# Patient Record
Sex: FEMALE | Race: WHITE | NOT HISPANIC OR LATINO | Employment: FULL TIME
[De-identification: names, ages, dates, MRNs, and addresses within clinical notes are randomized per-mention and may not be internally consistent; named-entity substitution may affect disease eponyms.]

---

## 2017-06-03 ENCOUNTER — HEALTH MAINTENANCE LETTER (OUTPATIENT)
Age: 37
End: 2017-06-03

## 2017-06-20 ENCOUNTER — TRANSFERRED RECORDS (OUTPATIENT)
Dept: HEALTH INFORMATION MANAGEMENT | Facility: CLINIC | Age: 37
End: 2017-06-20

## 2017-06-27 ENCOUNTER — RADIANT APPOINTMENT (OUTPATIENT)
Dept: ULTRASOUND IMAGING | Facility: CLINIC | Age: 37
End: 2017-06-27
Payer: COMMERCIAL

## 2017-06-27 ENCOUNTER — OFFICE VISIT (OUTPATIENT)
Dept: OBGYN | Facility: CLINIC | Age: 37
End: 2017-06-27
Payer: COMMERCIAL

## 2017-06-27 VITALS
WEIGHT: 183.8 LBS | SYSTOLIC BLOOD PRESSURE: 118 MMHG | BODY MASS INDEX: 28.85 KG/M2 | DIASTOLIC BLOOD PRESSURE: 76 MMHG | HEIGHT: 67 IN

## 2017-06-27 DIAGNOSIS — D21.9 MYOMA: Primary | ICD-10-CM

## 2017-06-27 DIAGNOSIS — D21.9 MYOMA: ICD-10-CM

## 2017-06-27 PROCEDURE — 99203 OFFICE O/P NEW LOW 30 MIN: CPT | Mod: 25 | Performed by: OBSTETRICS & GYNECOLOGY

## 2017-06-27 PROCEDURE — 76830 TRANSVAGINAL US NON-OB: CPT | Performed by: OBSTETRICS & GYNECOLOGY

## 2017-06-27 RX ORDER — ALPRAZOLAM 0.25 MG
0.25 TABLET ORAL
COMMUNITY
Start: 2017-05-22 | End: 2018-01-23

## 2017-06-27 ASSESSMENT — ANXIETY QUESTIONNAIRES
2. NOT BEING ABLE TO STOP OR CONTROL WORRYING: SEVERAL DAYS
5. BEING SO RESTLESS THAT IT IS HARD TO SIT STILL: NOT AT ALL
3. WORRYING TOO MUCH ABOUT DIFFERENT THINGS: NOT AT ALL
IF YOU CHECKED OFF ANY PROBLEMS ON THIS QUESTIONNAIRE, HOW DIFFICULT HAVE THESE PROBLEMS MADE IT FOR YOU TO DO YOUR WORK, TAKE CARE OF THINGS AT HOME, OR GET ALONG WITH OTHER PEOPLE: NOT DIFFICULT AT ALL
GAD7 TOTAL SCORE: 2
7. FEELING AFRAID AS IF SOMETHING AWFUL MIGHT HAPPEN: NOT AT ALL
6. BECOMING EASILY ANNOYED OR IRRITABLE: NOT AT ALL
1. FEELING NERVOUS, ANXIOUS, OR ON EDGE: SEVERAL DAYS

## 2017-06-27 ASSESSMENT — PATIENT HEALTH QUESTIONNAIRE - PHQ9: 5. POOR APPETITE OR OVEREATING: NOT AT ALL

## 2017-06-27 NOTE — MR AVS SNAPSHOT
"              After Visit Summary   2017    Yulisa Rai    MRN: [id number]           Patient Information     Date Of Birth          1980        Visit Information        Provider Department      2017 2:45 PM Josef Masters MD [facility]        Today's Diagnoses     [place]    -  1       Follow-ups after your visit        Who to contact     If you have questions or need follow up information about today's clinic visit or your schedule please contact [facility] directly at [phone].  Normal or non-critical lab and imaging results will be communicated to you by ugichemhart, letter or phone within 4 business days after the clinic has received the results. If you do not hear from us within 7 days, please contact the clinic through ugichemhart or phone. If you have a critical or abnormal lab result, we will notify you by phone as soon as possible.  Submit refill requests through [employer] or call your pharmacy and they will forward the refill request to us. Please allow 3 business days for your refill to be completed.          Additional Information About Your Visit        MyChart Information     [employer] lets you send messages to your doctor, view your test results, renew your prescriptions, schedule appointments and more. To sign up, go to [email or web address] . Click on \"Log in\" on the left side of the screen, which will take you to the Welcome page. Then click on \"Sign up Now\" on the right side of the page.     You will be asked to enter the access code listed below, as well as some personal information. Please follow the directions to create your username and password.     Your access code is: [email or web address]  Expires: 2017  4:26 PM     Your access code will  in 90 days. If you need help or a new code, please call your [place] clinic or [phone].        Care EveryWhere ID     This is your Care EveryWhere ID. This could be used by other " "organizations to access your [place] medical records  [id number]        Your Vitals Were     Height Last Period BMI (Body Mass Index)             5' 7\" (1.702 m) 06/15/2017 (Exact Date) 28.79 kg/m2          Blood Pressure from Last 3 Encounters:   06/27/17 118/76   04/11/11 112/69   03/17/10 (!) 89/57    Weight from Last 3 Encounters:   06/27/17 183 lb 12.8 oz (83.4 kg)   04/11/11 168 lb (76.2 kg)   03/17/10 161 lb (73 kg)               Primary Care Provider Office Phone # Fax #    Melvina Pennington [phone]       [place] [address]        Equal Access to Services     AYDEE MAJOR : Hadii lucy stoddard hadasho Soomaali, waaxda luqadaha, qaybta kaalmada adeegyada, waxay john hayherminia arellano . So [facility] [phone].    ATENCIÓN: Si habla español, tiene a burkett disposición servicios gratuitos de asistencia lingüística. Naveen al [phone].    We comply with applicable federal civil rights laws and [place] laws. We do not discriminate on the basis of race, color, national origin, age, disability sex, sexual orientation or gender identity.            Thank you!     Thank you for choosing [facility] [place]  for your care. Our goal is always to provide you with excellent care. Hearing back from our patients is one way we can continue to improve our services. Please take a few minutes to complete the written survey that you may receive in the mail after your visit with us. Thank you!             Your Updated Medication List - Protect others around you: Learn how to safely use, store and throw away your medicines at www.disposemymeds.org.          This list is accurate as of: 6/27/17  4:26 PM.  Always use your most recent med list.                   Brand Name Dispense Instructions for use Diagnosis    albuterol 90 MCG/ACT inhaler     1 Inhaler    Inhale 2 puffs into the lungs every 4 hours as needed for shortness of breath / dyspnea (or 20 " minutes prior to excercise).    Intermittent asthma       ALPRAZolam 0.25 MG tablet    XANAX     Take 0.25 mg by mouth        VALTREX 1000 mg tablet   Generic drug:  valACYclovir     8    2 tabs po bid x 1 day    Herpes simplex without mention of complication

## 2017-06-27 NOTE — PROGRESS NOTES
SUBJECTIVE:                                                   Yulisa Rai is a 37 year old female who presents to clinic today for the following health issue(s):  Patient presents with:  Consult: fibroids      Additional information:     HPI: The patient is a 37-year-old  3 para 0030 who is seen in consultation for a large intrauterine filling defect. It is indeterminate whether this is a complex of polyps or a intracavitary fibroid. Her menses have become heavier progressively to the point of debilitation. She does not become anemic.      No LMP recorded..   Patient is sexually active, .  Using none for contraception.    reports that she has never smoked. She does not have any smokeless tobacco history on file.    STD testing offered?  Declined    Health maintenance updated:  yes    Today's PHQ-2 Score: No flowsheet data found.  Today's PHQ-9 Score:   PHQ-9 SCORE 2011   Total Score 1     Today's BRIJESH-7 Score: No flowsheet data found.    Problem list and histories reviewed & adjusted, as indicated.  Additional history: as documented.    Patient Active Problem List   Diagnosis     Papanicolaou smear of cervix with atypical squamous cells of undetermined significance (ASC-US)     Personal history of other musculoskeletal disorders     Herpes simplex virus (HSV) infection     Cyst of Bartholin's gland     Papanicolaou smear of cervix with low grade squamous intraepithelial lesion (LGSIL)     Bunions     CARDIOVASCULAR SCREENING; LDL GOAL LESS THAN 160     Generalized anxiety disorder     Intermittent asthma     Past Surgical History:   Procedure Laterality Date     C NONSPECIFIC PROCEDURE      wisdom teeth extraction     C NONSPECIFIC PROCEDURE      rt. wrist fx     C NONSPECIFIC PROCEDURE  , 3/07    ASCUS, lgsil     C NONSPECIFIC PROCEDURE  3/03, 3/07    colposcopy      Social History   Substance Use Topics     Smoking status: Never Smoker     Smokeless tobacco: Not on file      Alcohol use Yes      Comment: 4/wk      Problem (# of Occurrences) Relation (Name,Age of Onset)    C.A.D. (2) Paternal Grandmother, Paternal Grandfather    Hypertension (2) Father, Mother    Lipids (2) Mother, Father            Current Outpatient Prescriptions   Medication Sig     citalopram (CELEXA) 20 MG tablet Take 1 tablet by mouth daily.     albuterol 90 MCG/ACT inhaler Inhale 2 puffs into the lungs every 4 hours as needed for shortness of breath / dyspnea (or 20 minutes prior to excercise).     norgestimate-ethinyl estradiol (SPRINTEC 28) 0.25-35 MG-MCG tablet Take 1 tablet by mouth daily.     ORTHO TRI-CYCLEN TABS   OR 1 tab po as directed     PROAIR  (90 BASE) MCG/ACT IN AERS INHALE 1 TO 2 PUFFS EVERY 4 TO 6 HOURS AS NEEDED     VALTREX 1 GM OR TABS 2 tabs po bid x 1 day     LUNESTA 2 MG OR TABS 1 TABLET AT BEDTIME     No current facility-administered medications for this visit.      Allergies   Allergen Reactions     No Known Drug Allergies        ROS:  12 point review of systems negative other than symptoms noted below.  Genitourinary: Cramps and Heavy Bleeding with Period    OBJECTIVE:     There were no vitals taken for this visit.  There is no height or weight on file to calculate BMI.    Exam:  Constitutional:  Appearance: Well nourished, well developed alert, in no acute distress  Chest:  Respiratory Effort:  Breathing unlabored  Cardiovascular: Heart: Auscultation:  Regular rate, normal rhythm, no murmurs present  Gastrointestinal:  Abdominal Examination:  Abdomen nontender to palpation, tone normal without rigidity or guarding, no masses present, umbilicus without lesions; Liver/Spleen:  No hepatomegaly present, liver nontender to palpation; Hernias:  No hernias present  Lymphatic: Lymph Nodes:  No other lymphadenopathy present  Skin:General Inspection:  No rashes present, no lesions present, no areas of discoloration; Genitalia and Groin:  No rashes present, no lesions present, no areas of  discoloration, no masses present.  Neurologic/Psychiatric:  Mental Status:  Oriented X3   Pelvic Exam:  External Genitalia:     Normal appearance for age, no discharge present, no tenderness present, no inflammatory lesions present, color normal  Vagina:     Normal vaginal vault without central or paravaginal defects, no discharge present, no inflammatory lesions present, no masses present  Bladder:     Nontender to palpation  Urethra:   Urethral Body:  Urethra palpation normal, urethra structural support normal   Urethral Meatus:  No erythema or lesions present  Cervix:     Appearance healthy, no lesions present, nontender to palpation, no bleeding present  Uterus:     Uterus: firm, normal sized and nontender, midplane in position. Large firm post culdesac myoma  Adnexa:     No adnexal tenderness present, no adnexal masses present  Perineum:     Perineum within normal limits, no evidence of trauma, no rashes or skin lesions present  Anus:     Anus within normal limits, no hemorrhoids present  Inguinal Lymph Nodes:     No lymphadenopathy present  Pubic Hair:     Normal pubic hair distribution for age  Genitalia and Groin:     No rashes present, no lesions present, no areas of discoloration, no masses present     In-Clinic Test Results: 1.1 cm intracavitary fibroid. 6.5 cm pedunculated posterior calcified myoma.      ASSESSMENT/PLAN:                                                        Patient with repetitive pregnancy loss and an intracavitary fibroid. She needs a hysteroscopy with resection of the fibroid. We have also showed our concerns about the size of her fibroid and obstruction to let delivery. She will contemplate laparoscopic evaluation for myomectomy. She understands that there is a 25-30% chance of having to do a laparotomy for removal of the fibroid. She will place this on her calendar when it is appropriate with work and family.        Josef Masters MD  [facility] [place]

## 2017-06-28 ENCOUNTER — TELEPHONE (OUTPATIENT)
Dept: OBGYN | Facility: CLINIC | Age: 37
End: 2017-06-28

## 2017-06-28 ASSESSMENT — ANXIETY QUESTIONNAIRES: GAD7 TOTAL SCORE: 2

## 2017-06-28 ASSESSMENT — PATIENT HEALTH QUESTIONNAIRE - PHQ9: SUM OF ALL RESPONSES TO PHQ QUESTIONS 1-9: 0

## 2017-06-28 NOTE — TELEPHONE ENCOUNTER
FROM VERBAL AS NO SX REQUEST IN PT CHART    Patient surgery scheduled on 7/20/2017 at 9AM Check in 7am  Location for surgery to performed:   Surgery Outpatient  Scheduled by Luci 6/27/2017     Information Packet given :Yes: HANDED 6/27/2017    CPT codes given: Yes    15682   05374     Consents signed? N/A  Rep Informed :N/A    PREOP DATE :  6/27/2017  In Epic :Yes    On Spreadsheet :Yes    On Calendar [facility]  :Yes    In [place] Calendar SAI  :No    Assist NA   Assist in Epic NA  Assist Notified as needed :No     Kaitlynn Betancur  Surgery Scheduler

## 2017-07-01 ENCOUNTER — HEALTH MAINTENANCE LETTER (OUTPATIENT)
Age: 37
End: 2017-07-01

## 2017-07-14 NOTE — H&P (VIEW-ONLY)
SUBJECTIVE:                                                   Yulisa Rai is a 37 year old female who presents to clinic today for the following health issue(s):  Patient presents with:  Consult: fibroids      Additional information:     HPI: The patient is a 37-year-old  3 para 0030 who is seen in consultation for a large intrauterine filling defect. It is indeterminate whether this is a complex of polyps or a intracavitary fibroid. Her menses have become heavier progressively to the point of debilitation. She does not become anemic.      No LMP recorded..   Patient is sexually active, .  Using none for contraception.    reports that she has never smoked. She does not have any smokeless tobacco history on file.    STD testing offered?  Declined    Health maintenance updated:  yes    Today's PHQ-2 Score: No flowsheet data found.  Today's PHQ-9 Score:   PHQ-9 SCORE 2011   Total Score 1     Today's BRIJESH-7 Score: No flowsheet data found.    Problem list and histories reviewed & adjusted, as indicated.  Additional history: as documented.    Patient Active Problem List   Diagnosis     Papanicolaou smear of cervix with atypical squamous cells of undetermined significance (ASC-US)     Personal history of other musculoskeletal disorders     Herpes simplex virus (HSV) infection     Cyst of Bartholin's gland     Papanicolaou smear of cervix with low grade squamous intraepithelial lesion (LGSIL)     Bunions     CARDIOVASCULAR SCREENING; LDL GOAL LESS THAN 160     Generalized anxiety disorder     Intermittent asthma     Past Surgical History:   Procedure Laterality Date     C NONSPECIFIC PROCEDURE      wisdom teeth extraction     C NONSPECIFIC PROCEDURE      rt. wrist fx     C NONSPECIFIC PROCEDURE  , 3/07    ASCUS, lgsil     C NONSPECIFIC PROCEDURE  3/03, 3/07    colposcopy      Social History   Substance Use Topics     Smoking status: Never Smoker     Smokeless tobacco: Not on file      Alcohol use Yes      Comment: 4/wk      Problem (# of Occurrences) Relation (Name,Age of Onset)    C.A.D. (2) Paternal Grandmother, Paternal Grandfather    Hypertension (2) Father, Mother    Lipids (2) Mother, Father            Current Outpatient Prescriptions   Medication Sig     citalopram (CELEXA) 20 MG tablet Take 1 tablet by mouth daily.     albuterol 90 MCG/ACT inhaler Inhale 2 puffs into the lungs every 4 hours as needed for shortness of breath / dyspnea (or 20 minutes prior to excercise).     norgestimate-ethinyl estradiol (SPRINTEC 28) 0.25-35 MG-MCG tablet Take 1 tablet by mouth daily.     ORTHO TRI-CYCLEN TABS   OR 1 tab po as directed     PROAIR  (90 BASE) MCG/ACT IN AERS INHALE 1 TO 2 PUFFS EVERY 4 TO 6 HOURS AS NEEDED     VALTREX 1 GM OR TABS 2 tabs po bid x 1 day     LUNESTA 2 MG OR TABS 1 TABLET AT BEDTIME     No current facility-administered medications for this visit.      Allergies   Allergen Reactions     No Known Drug Allergies        ROS:  12 point review of systems negative other than symptoms noted below.  Genitourinary: Cramps and Heavy Bleeding with Period    OBJECTIVE:     There were no vitals taken for this visit.  There is no height or weight on file to calculate BMI.    Exam:  Constitutional:  Appearance: Well nourished, well developed alert, in no acute distress  Chest:  Respiratory Effort:  Breathing unlabored  Cardiovascular: Heart: Auscultation:  Regular rate, normal rhythm, no murmurs present  Gastrointestinal:  Abdominal Examination:  Abdomen nontender to palpation, tone normal without rigidity or guarding, no masses present, umbilicus without lesions; Liver/Spleen:  No hepatomegaly present, liver nontender to palpation; Hernias:  No hernias present  Lymphatic: Lymph Nodes:  No other lymphadenopathy present  Skin:General Inspection:  No rashes present, no lesions present, no areas of discoloration; Genitalia and Groin:  No rashes present, no lesions present, no areas of  discoloration, no masses present.  Neurologic/Psychiatric:  Mental Status:  Oriented X3   Pelvic Exam:  External Genitalia:     Normal appearance for age, no discharge present, no tenderness present, no inflammatory lesions present, color normal  Vagina:     Normal vaginal vault without central or paravaginal defects, no discharge present, no inflammatory lesions present, no masses present  Bladder:     Nontender to palpation  Urethra:   Urethral Body:  Urethra palpation normal, urethra structural support normal   Urethral Meatus:  No erythema or lesions present  Cervix:     Appearance healthy, no lesions present, nontender to palpation, no bleeding present  Uterus:     Uterus: firm, normal sized and nontender, midplane in position. Large firm post culdesac myoma  Adnexa:     No adnexal tenderness present, no adnexal masses present  Perineum:     Perineum within normal limits, no evidence of trauma, no rashes or skin lesions present  Anus:     Anus within normal limits, no hemorrhoids present  Inguinal Lymph Nodes:     No lymphadenopathy present  Pubic Hair:     Normal pubic hair distribution for age  Genitalia and Groin:     No rashes present, no lesions present, no areas of discoloration, no masses present     In-Clinic Test Results: 1.1 cm intracavitary fibroid. 6.5 cm pedunculated posterior calcified myoma.      ASSESSMENT/PLAN:                                                        Patient with repetitive pregnancy loss and an intracavitary fibroid. She needs a hysteroscopy with resection of the fibroid. We have also showed our concerns about the size of her fibroid and obstruction to let delivery. She will contemplate laparoscopic evaluation for myomectomy. She understands that there is a 25-30% chance of having to do a laparotomy for removal of the fibroid. She will place this on her calendar when it is appropriate with work and family.        Josef Masters MD  [facility] [place]

## 2017-07-20 ENCOUNTER — ANESTHESIA (OUTPATIENT)
Dept: SURGERY | Facility: CLINIC | Age: 37
End: 2017-07-20
Payer: COMMERCIAL

## 2017-07-20 ENCOUNTER — ANESTHESIA EVENT (OUTPATIENT)
Dept: SURGERY | Facility: CLINIC | Age: 37
End: 2017-07-20
Payer: COMMERCIAL

## 2017-07-20 ENCOUNTER — HOSPITAL ENCOUNTER (OUTPATIENT)
Facility: CLINIC | Age: 37
Discharge: HOME | End: 2017-07-20
Attending: OBSTETRICS & GYNECOLOGY | Admitting: OBSTETRICS & GYNECOLOGY
Payer: COMMERCIAL

## 2017-07-20 VITALS
TEMPERATURE: 97.9 F | RESPIRATION RATE: 16 BRPM | BODY MASS INDEX: 29.49 KG/M2 | HEART RATE: 86 BPM | WEIGHT: 187.9 LBS | HEIGHT: 67 IN | DIASTOLIC BLOOD PRESSURE: 66 MMHG | SYSTOLIC BLOOD PRESSURE: 99 MMHG | OXYGEN SATURATION: 96 %

## 2017-07-20 DIAGNOSIS — D25.0 SUBMUCOUS AND SUBSEROUS LEIOMYOMA OF UTERUS: Primary | ICD-10-CM

## 2017-07-20 DIAGNOSIS — D25.2 SUBMUCOUS AND SUBSEROUS LEIOMYOMA OF UTERUS: Primary | ICD-10-CM

## 2017-07-20 LAB — HCG SERPL QL: NEGATIVE

## 2017-07-20 PROCEDURE — 58561 HYSTEROSCOPY REMOVE MYOMA: CPT | Performed by: OBSTETRICS & GYNECOLOGY

## 2017-07-20 PROCEDURE — 88305 TISSUE EXAM BY PATHOLOGIST: CPT | Mod: 26 | Performed by: OBSTETRICS & GYNECOLOGY

## 2017-07-20 PROCEDURE — 71000012 ZZH RECOVERY PHASE 1 LEVEL 1 FIRST HR: Performed by: OBSTETRICS & GYNECOLOGY

## 2017-07-20 PROCEDURE — 71000027 ZZH RECOVERY PHASE 2 EACH 15 MINS: Performed by: OBSTETRICS & GYNECOLOGY

## 2017-07-20 PROCEDURE — 37000008 ZZH ANESTHESIA TECHNICAL FEE, 1ST 30 MIN: Performed by: OBSTETRICS & GYNECOLOGY

## 2017-07-20 PROCEDURE — 88305 TISSUE EXAM BY PATHOLOGIST: CPT | Performed by: OBSTETRICS & GYNECOLOGY

## 2017-07-20 PROCEDURE — 25000125 ZZHC RX 250: Performed by: NURSE ANESTHETIST, CERTIFIED REGISTERED

## 2017-07-20 PROCEDURE — 25000128 H RX IP 250 OP 636: Performed by: ANESTHESIOLOGY

## 2017-07-20 PROCEDURE — 71000013 ZZH RECOVERY PHASE 1 LEVEL 1 EA ADDTL HR: Performed by: OBSTETRICS & GYNECOLOGY

## 2017-07-20 PROCEDURE — 27210794 ZZH OR GENERAL SUPPLY STERILE: Performed by: OBSTETRICS & GYNECOLOGY

## 2017-07-20 PROCEDURE — 25000128 H RX IP 250 OP 636: Performed by: NURSE ANESTHETIST, CERTIFIED REGISTERED

## 2017-07-20 PROCEDURE — 25000128 H RX IP 250 OP 636: Performed by: OBSTETRICS & GYNECOLOGY

## 2017-07-20 PROCEDURE — 40000170 ZZH STATISTIC PRE-PROCEDURE ASSESSMENT II: Performed by: OBSTETRICS & GYNECOLOGY

## 2017-07-20 PROCEDURE — 58545 LAPAROSCOPIC MYOMECTOMY: CPT | Performed by: OBSTETRICS & GYNECOLOGY

## 2017-07-20 PROCEDURE — 36000063 ZZH SURGERY LEVEL 4 EA 15 ADDTL MIN: Performed by: OBSTETRICS & GYNECOLOGY

## 2017-07-20 PROCEDURE — 36000093 ZZH SURGERY LEVEL 4 1ST 30 MIN: Performed by: OBSTETRICS & GYNECOLOGY

## 2017-07-20 PROCEDURE — 84703 CHORIONIC GONADOTROPIN ASSAY: CPT | Performed by: ANESTHESIOLOGY

## 2017-07-20 PROCEDURE — 25000566 ZZH SEVOFLURANE, EA 15 MIN: Performed by: OBSTETRICS & GYNECOLOGY

## 2017-07-20 PROCEDURE — S0020 INJECTION, BUPIVICAINE HYDRO: HCPCS | Performed by: OBSTETRICS & GYNECOLOGY

## 2017-07-20 PROCEDURE — 37000009 ZZH ANESTHESIA TECHNICAL FEE, EACH ADDTL 15 MIN: Performed by: OBSTETRICS & GYNECOLOGY

## 2017-07-20 PROCEDURE — 25000132 ZZH RX MED GY IP 250 OP 250 PS 637: Performed by: OBSTETRICS & GYNECOLOGY

## 2017-07-20 PROCEDURE — 36415 COLL VENOUS BLD VENIPUNCTURE: CPT | Performed by: ANESTHESIOLOGY

## 2017-07-20 PROCEDURE — 27210995 ZZH RX 272: Performed by: OBSTETRICS & GYNECOLOGY

## 2017-07-20 PROCEDURE — 25000125 ZZHC RX 250: Performed by: OBSTETRICS & GYNECOLOGY

## 2017-07-20 PROCEDURE — 25800025 ZZH RX 258: Performed by: OBSTETRICS & GYNECOLOGY

## 2017-07-20 RX ORDER — GLYCOPYRROLATE 0.2 MG/ML
INJECTION, SOLUTION INTRAMUSCULAR; INTRAVENOUS PRN
Status: DISCONTINUED | OUTPATIENT
Start: 2017-07-20 | End: 2017-07-20

## 2017-07-20 RX ORDER — EPHEDRINE SULFATE 50 MG/ML
INJECTION, SOLUTION INTRAMUSCULAR; INTRAVENOUS; SUBCUTANEOUS PRN
Status: DISCONTINUED | OUTPATIENT
Start: 2017-07-20 | End: 2017-07-20

## 2017-07-20 RX ORDER — ONDANSETRON 2 MG/ML
INJECTION INTRAMUSCULAR; INTRAVENOUS PRN
Status: DISCONTINUED | OUTPATIENT
Start: 2017-07-20 | End: 2017-07-20

## 2017-07-20 RX ORDER — FENTANYL CITRATE 50 UG/ML
25-50 INJECTION, SOLUTION INTRAMUSCULAR; INTRAVENOUS
Status: DISCONTINUED | OUTPATIENT
Start: 2017-07-20 | End: 2017-07-20 | Stop reason: HOSPADM

## 2017-07-20 RX ORDER — ONDANSETRON 2 MG/ML
4 INJECTION INTRAMUSCULAR; INTRAVENOUS EVERY 30 MIN PRN
Status: DISCONTINUED | OUTPATIENT
Start: 2017-07-20 | End: 2017-07-20 | Stop reason: HOSPADM

## 2017-07-20 RX ORDER — FENTANYL CITRATE 50 UG/ML
INJECTION, SOLUTION INTRAMUSCULAR; INTRAVENOUS PRN
Status: DISCONTINUED | OUTPATIENT
Start: 2017-07-20 | End: 2017-07-20

## 2017-07-20 RX ORDER — BUPIVACAINE HYDROCHLORIDE 2.5 MG/ML
INJECTION, SOLUTION EPIDURAL; INFILTRATION; INTRACAUDAL
Status: DISCONTINUED
Start: 2017-07-20 | End: 2017-07-20 | Stop reason: HOSPADM

## 2017-07-20 RX ORDER — BUPIVACAINE HYDROCHLORIDE 2.5 MG/ML
INJECTION, SOLUTION INFILTRATION; PERINEURAL PRN
Status: DISCONTINUED | OUTPATIENT
Start: 2017-07-20 | End: 2017-07-20 | Stop reason: HOSPADM

## 2017-07-20 RX ORDER — MAGNESIUM HYDROXIDE 1200 MG/15ML
LIQUID ORAL PRN
Status: DISCONTINUED | OUTPATIENT
Start: 2017-07-20 | End: 2017-07-20 | Stop reason: HOSPADM

## 2017-07-20 RX ORDER — DEXAMETHASONE SODIUM PHOSPHATE 4 MG/ML
INJECTION, SOLUTION INTRA-ARTICULAR; INTRALESIONAL; INTRAMUSCULAR; INTRAVENOUS; SOFT TISSUE PRN
Status: DISCONTINUED | OUTPATIENT
Start: 2017-07-20 | End: 2017-07-20

## 2017-07-20 RX ORDER — HYDROMORPHONE HYDROCHLORIDE 1 MG/ML
.3-.5 INJECTION, SOLUTION INTRAMUSCULAR; INTRAVENOUS; SUBCUTANEOUS EVERY 10 MIN PRN
Status: DISCONTINUED | OUTPATIENT
Start: 2017-07-20 | End: 2017-07-20 | Stop reason: HOSPADM

## 2017-07-20 RX ORDER — CEFAZOLIN SODIUM 1 G/3ML
1 INJECTION, POWDER, FOR SOLUTION INTRAMUSCULAR; INTRAVENOUS SEE ADMIN INSTRUCTIONS
Status: DISCONTINUED | OUTPATIENT
Start: 2017-07-20 | End: 2017-07-20 | Stop reason: HOSPADM

## 2017-07-20 RX ORDER — PROPOFOL 10 MG/ML
INJECTION, EMULSION INTRAVENOUS CONTINUOUS PRN
Status: DISCONTINUED | OUTPATIENT
Start: 2017-07-20 | End: 2017-07-20

## 2017-07-20 RX ORDER — NEOSTIGMINE METHYLSULFATE 1 MG/ML
VIAL (ML) INJECTION PRN
Status: DISCONTINUED | OUTPATIENT
Start: 2017-07-20 | End: 2017-07-20

## 2017-07-20 RX ORDER — PROPOFOL 10 MG/ML
INJECTION, EMULSION INTRAVENOUS PRN
Status: DISCONTINUED | OUTPATIENT
Start: 2017-07-20 | End: 2017-07-20

## 2017-07-20 RX ORDER — NALOXONE HYDROCHLORIDE 0.4 MG/ML
.1-.4 INJECTION, SOLUTION INTRAMUSCULAR; INTRAVENOUS; SUBCUTANEOUS
Status: DISCONTINUED | OUTPATIENT
Start: 2017-07-20 | End: 2017-07-20 | Stop reason: HOSPADM

## 2017-07-20 RX ORDER — FENTANYL CITRATE 50 UG/ML
25-50 INJECTION, SOLUTION INTRAMUSCULAR; INTRAVENOUS EVERY 5 MIN PRN
Status: DISCONTINUED | OUTPATIENT
Start: 2017-07-20 | End: 2017-07-20 | Stop reason: HOSPADM

## 2017-07-20 RX ORDER — OXYCODONE AND ACETAMINOPHEN 5; 325 MG/1; MG/1
1-2 TABLET ORAL
Status: COMPLETED | OUTPATIENT
Start: 2017-07-20 | End: 2017-07-20

## 2017-07-20 RX ORDER — KETOROLAC TROMETHAMINE 30 MG/ML
INJECTION, SOLUTION INTRAMUSCULAR; INTRAVENOUS PRN
Status: DISCONTINUED | OUTPATIENT
Start: 2017-07-20 | End: 2017-07-20

## 2017-07-20 RX ORDER — PHYSOSTIGMINE SALICYLATE 1 MG/ML
1.2 INJECTION INTRAVENOUS
Status: DISCONTINUED | OUTPATIENT
Start: 2017-07-20 | End: 2017-07-20 | Stop reason: HOSPADM

## 2017-07-20 RX ORDER — OXYCODONE AND ACETAMINOPHEN 5; 325 MG/1; MG/1
1-2 TABLET ORAL EVERY 4 HOURS PRN
Qty: 20 TABLET | Refills: 0 | Status: SHIPPED | OUTPATIENT
Start: 2017-07-20 | End: 2017-08-03

## 2017-07-20 RX ORDER — CEFAZOLIN SODIUM 2 G/100ML
2 INJECTION, SOLUTION INTRAVENOUS
Status: COMPLETED | OUTPATIENT
Start: 2017-07-20 | End: 2017-07-20

## 2017-07-20 RX ORDER — PHENAZOPYRIDINE HYDROCHLORIDE 200 MG/1
200 TABLET, FILM COATED ORAL ONCE
Status: DISCONTINUED | OUTPATIENT
Start: 2017-07-20 | End: 2017-07-20 | Stop reason: HOSPADM

## 2017-07-20 RX ORDER — SODIUM CHLORIDE, SODIUM LACTATE, POTASSIUM CHLORIDE, CALCIUM CHLORIDE 600; 310; 30; 20 MG/100ML; MG/100ML; MG/100ML; MG/100ML
INJECTION, SOLUTION INTRAVENOUS CONTINUOUS PRN
Status: DISCONTINUED | OUTPATIENT
Start: 2017-07-20 | End: 2017-07-20

## 2017-07-20 RX ORDER — SODIUM CHLORIDE, SODIUM LACTATE, POTASSIUM CHLORIDE, CALCIUM CHLORIDE 600; 310; 30; 20 MG/100ML; MG/100ML; MG/100ML; MG/100ML
INJECTION, SOLUTION INTRAVENOUS CONTINUOUS
Status: DISCONTINUED | OUTPATIENT
Start: 2017-07-20 | End: 2017-07-20 | Stop reason: HOSPADM

## 2017-07-20 RX ORDER — ONDANSETRON 4 MG/1
4 TABLET, ORALLY DISINTEGRATING ORAL EVERY 30 MIN PRN
Status: DISCONTINUED | OUTPATIENT
Start: 2017-07-20 | End: 2017-07-20 | Stop reason: HOSPADM

## 2017-07-20 RX ORDER — LIDOCAINE HYDROCHLORIDE 20 MG/ML
INJECTION, SOLUTION INFILTRATION; PERINEURAL PRN
Status: DISCONTINUED | OUTPATIENT
Start: 2017-07-20 | End: 2017-07-20

## 2017-07-20 RX ORDER — MEPERIDINE HYDROCHLORIDE 25 MG/ML
12.5 INJECTION INTRAMUSCULAR; INTRAVENOUS; SUBCUTANEOUS
Status: DISCONTINUED | OUTPATIENT
Start: 2017-07-20 | End: 2017-07-20 | Stop reason: HOSPADM

## 2017-07-20 RX ADMIN — KETOROLAC TROMETHAMINE 30 MG: 30 INJECTION, SOLUTION INTRAMUSCULAR at 09:43

## 2017-07-20 RX ADMIN — FENTANYL CITRATE 50 MCG: 50 INJECTION, SOLUTION INTRAMUSCULAR; INTRAVENOUS at 09:02

## 2017-07-20 RX ADMIN — MIDAZOLAM HYDROCHLORIDE 2 MG: 1 INJECTION, SOLUTION INTRAMUSCULAR; INTRAVENOUS at 08:46

## 2017-07-20 RX ADMIN — PROPOFOL 200 MG: 10 INJECTION, EMULSION INTRAVENOUS at 08:46

## 2017-07-20 RX ADMIN — Medication 7.5 MG: at 09:43

## 2017-07-20 RX ADMIN — ONDANSETRON 4 MG: 2 INJECTION INTRAMUSCULAR; INTRAVENOUS at 08:53

## 2017-07-20 RX ADMIN — FENTANYL CITRATE 50 MCG: 50 INJECTION, SOLUTION INTRAMUSCULAR; INTRAVENOUS at 10:34

## 2017-07-20 RX ADMIN — GLYCOPYRROLATE 0.2 MG: 0.2 INJECTION, SOLUTION INTRAMUSCULAR; INTRAVENOUS at 09:44

## 2017-07-20 RX ADMIN — LIDOCAINE HYDROCHLORIDE 60 MG: 20 INJECTION, SOLUTION INFILTRATION; PERINEURAL at 08:46

## 2017-07-20 RX ADMIN — GLYCOPYRROLATE 0.4 MG: 0.2 INJECTION, SOLUTION INTRAMUSCULAR; INTRAVENOUS at 09:42

## 2017-07-20 RX ADMIN — ROCURONIUM BROMIDE 10 MG: 10 INJECTION INTRAVENOUS at 08:54

## 2017-07-20 RX ADMIN — NEOSTIGMINE METHYLSULFATE 2 MG: 1 INJECTION INTRAMUSCULAR; INTRAVENOUS; SUBCUTANEOUS at 09:42

## 2017-07-20 RX ADMIN — CEFAZOLIN SODIUM 2 G: 2 INJECTION, SOLUTION INTRAVENOUS at 08:48

## 2017-07-20 RX ADMIN — ROCURONIUM BROMIDE 10 MG: 10 INJECTION INTRAVENOUS at 09:22

## 2017-07-20 RX ADMIN — SODIUM CHLORIDE, POTASSIUM CHLORIDE, SODIUM LACTATE AND CALCIUM CHLORIDE: 600; 310; 30; 20 INJECTION, SOLUTION INTRAVENOUS at 09:23

## 2017-07-20 RX ADMIN — GLYCOPYRROLATE 0.1 MG: 0.2 INJECTION, SOLUTION INTRAMUSCULAR; INTRAVENOUS at 09:49

## 2017-07-20 RX ADMIN — SODIUM CHLORIDE, POTASSIUM CHLORIDE, SODIUM LACTATE AND CALCIUM CHLORIDE: 600; 310; 30; 20 INJECTION, SOLUTION INTRAVENOUS at 08:46

## 2017-07-20 RX ADMIN — OXYCODONE HYDROCHLORIDE AND ACETAMINOPHEN 1 TABLET: 5; 325 TABLET ORAL at 11:14

## 2017-07-20 RX ADMIN — DEXMEDETOMIDINE HYDROCHLORIDE 8 MCG: 100 INJECTION, SOLUTION INTRAVENOUS at 09:43

## 2017-07-20 RX ADMIN — NEOSTIGMINE METHYLSULFATE 2 MG: 1 INJECTION INTRAMUSCULAR; INTRAVENOUS; SUBCUTANEOUS at 09:43

## 2017-07-20 RX ADMIN — DEXAMETHASONE SODIUM PHOSPHATE 4 MG: 4 INJECTION, SOLUTION INTRA-ARTICULAR; INTRALESIONAL; INTRAMUSCULAR; INTRAVENOUS; SOFT TISSUE at 08:54

## 2017-07-20 RX ADMIN — PROPOFOL 200 MCG/KG/MIN: 10 INJECTION, EMULSION INTRAVENOUS at 08:46

## 2017-07-20 RX ADMIN — ROCURONIUM BROMIDE 30 MG: 10 INJECTION INTRAVENOUS at 08:46

## 2017-07-20 RX ADMIN — FENTANYL CITRATE 50 MCG: 50 INJECTION, SOLUTION INTRAMUSCULAR; INTRAVENOUS at 08:46

## 2017-07-20 NOTE — IP AVS SNAPSHOT
[facility]    [address]    [place] [address]    Phone:  [phone]    Fax:  [phone]                                       After Visit Summary   7/20/2017    Yulisa Rai    MRN: [id number]           After Visit Summary Signature Page     I have received my discharge instructions, and my questions have been answered. I have discussed any challenges I see with this plan with the nurse or doctor.    ..........................................................................................................................................  Patient/Patient Representative Signature      ..........................................................................................................................................  Patient Representative Print Name and Relationship to Patient    ..................................................               ................................................  Date                                            Time    ..........................................................................................................................................  Reviewed by Signature/Title    ...................................................              ..............................................  Date                                                            Time

## 2017-07-20 NOTE — IP AVS SNAPSHOT
MRN:[id number]                      After Visit Summary   7/20/2017    Yulisa Rai    MRN: [id number]           Thank you!     Thank you for choosing [facility] for your care. Our goal is always to provide you with excellent care. Hearing back from our patients is one way we can continue to improve our services. Please take a few minutes to complete the written survey that you may receive in the mail after you visit with us. Thank you!        Patient Information     Date Of Birth          1980        About your hospital stay     You were admitted on:  July 20, 2017 You last received care in the:  [facility] Same Day Surgery    You were discharged on:  July 20, 2017       Who to Call     For medical emergencies, please call 911.  For non-urgent questions about your medical care, please call your primary care provider or clinic, [phone]  For questions related to your surgery, please call your surgery clinic        Attending Provider     Provider Josef Kaufman MD OB/Gyn       Primary Care Provider Office Phone # Fax #    Melvina Pennington [phone]      After Care Instructions     Discharge Instructions       Patient to arrange follow up appointment in 2  weeks                  Further instructions from your care team       Same Day Surgery Discharge Instructions for  Sedation and General Anesthesia       It's not unusual to feel dizzy, light-headed or faint for up to 24 hours after surgery or while taking pain medication.  If you have these symptoms: sit for a few minutes before standing and have someone assist you when you get up to walk or use the bathroom.      You should rest and relax for the next 24 hours. We recommend you make arrangements to have an adult stay with you for at least 24 hours after your discharge.  Avoid hazardous and strenuous activity.      DO NOT DRIVE any vehicle or operate mechanical equipment for 24 hours  following the end of your surgery.  Even though you may feel normal, your reactions may be affected by the medication you have received.      Do not drink alcoholic beverages for 24 hours following surgery.       Slowly progress to your regular diet as you feel able. It's not unusual to feel nauseated and/or vomit after receiving anesthesia.  If you develop these symptoms, drink clear liquids (apple juice, ginger ale, broth, 7-up, etc. ) until you feel better.  If your nausea and vomiting persists for 24 hours, please notify your surgeon.        All narcotic pain medications, along with inactivity and anesthesia, can cause constipation. Drinking plenty of liquids and increasing fiber intake will help.      For any questions of a medical nature, call your surgeon.      Do not make important decisions for 24 hours.      If you had general anesthesia, you may have a sore throat for a couple of days related to the breathing tube used during surgery.  You may use Cepacol lozenges to help with this discomfort.  If it worsens or if you develop a fever, contact your surgeon.       If you feel your pain is not well managed with the pain medications prescribed by your surgeon, please contact your surgeon's office to let them know so they can address your concerns.     While you were at the hospital today you received Toradol, an antiinflammatory medication similar to Ibuprofen.  You should not take other antiinflammatory medication, such as Ibuprofen, Motrin, Advil, Aleve, Naprosyn, etc, until 3:45 PM    HOME CARE FOLLOWING LAPAROSCOPY  Dr. Masters and Dr. Haines  [phone]      Diet  You have no restrictions on your diet.  During the evening following surgery, drink plenty of fluids and eat a light supper.    Nausea  The anesthesia may produce some nausea.  If you feel nauseated try drinking fluids such as 7-Up, tea, or soup.     Discomfort  The amount of discomfort you can expect is very unpredictable.  If you have pain  that cannot be controlled with Tylenol or with the prescription you may have received, you should notify your physician.  The following complaints are not uncommon and should not be cause for concern:  1. Abdominal tenderness; abdominal cramping.  2. Low backache or pain radiating to your shoulders, chest or back.  This is a result of the gas used to inflate your abdomen during surgery.  Lying flat in bed seems to help relieve this.   3. Sore throat for a day or two resulting from the anesthesia tube used during surgery.   4. Some bruising on your abdomen.     Drainage  You may expect a small amount of drainage from the incision on your abdomen and you may change the bandage when necessary.  You will also have a small amount of vaginal drainage for several days; this is normal and no cause for concern.  If excessive bleeding occurs, notify your physician.      If dye was used during your procedure, your urine will initially be bright blue. It will gradually return to yellow throughout the day. Drinking plenty of fluids will help to filter the dye from your urine.    Fever  A low grade fever (not over 101  Fahrenheit) is usual after this procedure.  Do not hesitate to notify your physician if your fever seems excessive.    Activity  Rest on the day of surgery then you may resume your normal activity, as tolerated. Avoid heavy lifting for one week.    You may shower.  Do not douche or use tampons.  If you also had a D&C, do not resume intercourse until bleeding has ceased.    Emergency Care  Contact your physician if you have any of these problems:   1.  A fever over 101  Fahrenheit   2.  A large amount of bleeding or drainage   3.  Severe pain         [facility]  Discharge Instructions  Following Hysteroscopy    Activity  You may resume normal activities including lifting as needed.  It is permissible to climb stairs. You may drive after 24 hours as long as you are not taking narcotic pain pills.   "Baths or showers are perfectly acceptable.      Vaginal Discharge  You may have some vaginal bleeding or discharge for about a week after procedure.  You may use tampons or pads.    Temperature  If you develop temperature elevations to over 101  Fahrenheit, your physician should be called immediately.    Diet  Luce or light diet is advisable the day of surgery.  If nausea persists, continue this diet.  If severe, call.    Follow-up  Make an appointment in 1-2 weeks if instructed to at: [phone]        [facility]  [address] [place] [address]  [phone]  HORTENSIA Oden MD       **If you have questions or concerns about your procedure,   call Dr. Masters at [phone]**  .               Pending Results     Date and Time Order Name Status Description    2017 0922 Surgical pathology exam In process             Admission Information     Date & Time Provider Department Dept. Phone    2017 Josef Masters MD [facility] Same Day Surgery [phone]      Your Vitals Were     Blood Pressure Pulse Temperature Respirations Height Weight    111/76 86 97  F (36.1  C) 24 1.702 m (5' 7\") 85.2 kg (187 lb 14.4 oz)    Last Period Pulse Oximetry BMI (Body Mass Index)             07/10/2017 (Exact Date) 100% 29.43 kg/m2         [employer] Information     [employer] lets you send messages to your doctor, view your test results, renew your prescriptions, schedule appointments and more. To sign up, go to [email or web address] . Click on \"Log in\" on the left side of the screen, which will take you to the Welcome page. Then click on \"Sign up Now\" on the right side of the page.     You will be asked to enter the access code listed below, as well as some personal information. Please follow the directions to create your username and password.     Your access code is: [email or web address]  Expires: 2017  4:26 PM     Your access code will  in 90 " days. If you need help or a new code, please call your [facility] clinic or [phone].        Care EveryWhere ID     This is your Care EveryWhere ID. This could be used by other organizations to access your [facility] medical records  [id number]        Equal Access to Services     KEYURELISHA PEDRITO : Yuni stoddard juancoby Chanceali, wachuckda luqadaha, qaybta kaalmada adejozef, shona lopez samantharishi soliman myrtle adan gordon. So [facility] [phone].    ATENCIÓN: Si habla español, tiene a burkett disposición servicios gratuitos de asistencia lingüística. Llame al [phone].    We comply with applicable federal civil rights laws and [place] laws. We do not discriminate on the basis of race, color, national origin, age, disability sex, sexual orientation or gender identity.               Review of your medicines      START taking        Dose / Directions    oxyCODONE-acetaminophen 5-325 MG per tablet   Commonly known as:  PERCOCET   Used for:  Submucous and subserous leiomyoma of uterus   Notes to Patient:  One percocet pain pill given at 11:14 AM        Dose:  1-2 tablet   Take 1-2 tablets by mouth every 4 hours as needed for pain (moderate to severe)   Quantity:  20 tablet   Refills:  0         CONTINUE these medicines which have NOT CHANGED        Dose / Directions    albuterol 90 MCG/ACT inhaler   Used for:  Intermittent asthma        Dose:  2 puff   Inhale 2 puffs into the lungs every 4 hours as needed for shortness of breath / dyspnea (or 20 minutes prior to excercise).   Quantity:  1 Inhaler   Refills:  prn       ALPRAZolam 0.25 MG tablet   Commonly known as:  XANAX        Dose:  0.25 mg   Take 0.25 mg by mouth   Refills:  0       VALTREX 1000 mg tablet   Used for:  Herpes simplex without mention of complication   Generic drug:  valACYclovir        2 tabs po bid x 1 day   Quantity:  8   Refills:  2            Where to get your medicines      Some of these will need a paper prescription and others can be bought over the counter.  Ask your nurse if you have questions.     Bring a paper prescription for each of these medications     oxyCODONE-acetaminophen 5-325 MG per tablet                Protect others around you: Learn how to safely use, store and throw away your medicines at www.disposemymeds.org.             Medication List: This is a list of all your medications and when to take them. Check marks below indicate your daily home schedule. Keep this list as a reference.      Medications           Morning Afternoon Evening Bedtime As Needed    albuterol 90 MCG/ACT inhaler   Inhale 2 puffs into the lungs every 4 hours as needed for shortness of breath / dyspnea (or 20 minutes prior to excercise).                                ALPRAZolam 0.25 MG tablet   Commonly known as:  XANAX   Take 0.25 mg by mouth                                oxyCODONE-acetaminophen 5-325 MG per tablet   Commonly known as:  PERCOCET   Take 1-2 tablets by mouth every 4 hours as needed for pain (moderate to severe)   Last time this was given:  1 tablet on 7/20/2017 11:14 AM   Notes to Patient:  One percocet pain pill given at 11:14 AM                                VALTREX 1000 mg tablet   2 tabs po bid x 1 day   Generic drug:  valACYclovir

## 2017-07-20 NOTE — ANESTHESIA PREPROCEDURE EVALUATION
Anesthesia Evaluation     . Pt has had prior anesthetic. Type: General           ROS/MED HX    ENT/Pulmonary:     (+)Intermittent asthma , . .    Neurologic:       Cardiovascular:         METS/Exercise Tolerance:     Hematologic:         Musculoskeletal:         GI/Hepatic:         Renal/Genitourinary:         Endo:         Psychiatric:     (+) psychiatric history anxiety      Infectious Disease:         Malignancy:         Other:                                    Anesthesia Plan      History & Physical Review  History and physical reviewed and following examination; no interval change.    ASA Status:  2 .        Plan for General with Intravenous induction.   PONV prophylaxis:  Ondansetron (or other 5HT-3) and Dexamethasone or Solumedrol  Propofol, Zofran, and decadron      Postoperative Care  Postoperative pain management:  IV analgesics and Oral pain medications.      Consents  Anesthetic plan, risks, benefits and alternatives discussed with:  Patient..                          .

## 2017-07-20 NOTE — BRIEF OP NOTE
BRIEF OP NOTE-  Hsc, myomectomy x 1, multiple polypectomies, Lsc, myomectomy x 2 ( >5 cm)   ebl-10 cc,  No complications

## 2017-07-20 NOTE — DISCHARGE INSTRUCTIONS
Same Day Surgery Discharge Instructions for  Sedation and General Anesthesia       It's not unusual to feel dizzy, light-headed or faint for up to 24 hours after surgery or while taking pain medication.  If you have these symptoms: sit for a few minutes before standing and have someone assist you when you get up to walk or use the bathroom.      You should rest and relax for the next 24 hours. We recommend you make arrangements to have an adult stay with you for at least 24 hours after your discharge.  Avoid hazardous and strenuous activity.      DO NOT DRIVE any vehicle or operate mechanical equipment for 24 hours following the end of your surgery.  Even though you may feel normal, your reactions may be affected by the medication you have received.      Do not drink alcoholic beverages for 24 hours following surgery.       Slowly progress to your regular diet as you feel able. It's not unusual to feel nauseated and/or vomit after receiving anesthesia.  If you develop these symptoms, drink clear liquids (apple juice, ginger ale, broth, 7-up, etc. ) until you feel better.  If your nausea and vomiting persists for 24 hours, please notify your surgeon.        All narcotic pain medications, along with inactivity and anesthesia, can cause constipation. Drinking plenty of liquids and increasing fiber intake will help.      For any questions of a medical nature, call your surgeon.      Do not make important decisions for 24 hours.      If you had general anesthesia, you may have a sore throat for a couple of days related to the breathing tube used during surgery.  You may use Cepacol lozenges to help with this discomfort.  If it worsens or if you develop a fever, contact your surgeon.       If you feel your pain is not well managed with the pain medications prescribed by your surgeon, please contact your surgeon's office to let them know so they can address your concerns.     While you were at the hospital today you  received Toradol, an antiinflammatory medication similar to Ibuprofen.  You should not take other antiinflammatory medication, such as Ibuprofen, Motrin, Advil, Aleve, Naprosyn, etc, until 3:45 PM    HOME CARE FOLLOWING LAPAROSCOPY  Dr. Masters and Dr. Haines  [phone]      Diet  You have no restrictions on your diet.  During the evening following surgery, drink plenty of fluids and eat a light supper.    Nausea  The anesthesia may produce some nausea.  If you feel nauseated try drinking fluids such as 7-Up, tea, or soup.     Discomfort  The amount of discomfort you can expect is very unpredictable.  If you have pain that cannot be controlled with Tylenol or with the prescription you may have received, you should notify your physician.  The following complaints are not uncommon and should not be cause for concern:  1. Abdominal tenderness; abdominal cramping.  2. Low backache or pain radiating to your shoulders, chest or back.  This is a result of the gas used to inflate your abdomen during surgery.  Lying flat in bed seems to help relieve this.   3. Sore throat for a day or two resulting from the anesthesia tube used during surgery.   4. Some bruising on your abdomen.     Drainage  You may expect a small amount of drainage from the incision on your abdomen and you may change the bandage when necessary.  You will also have a small amount of vaginal drainage for several days; this is normal and no cause for concern.  If excessive bleeding occurs, notify your physician.      If dye was used during your procedure, your urine will initially be bright blue. It will gradually return to yellow throughout the day. Drinking plenty of fluids will help to filter the dye from your urine.    Fever  A low grade fever (not over 101  Fahrenheit) is usual after this procedure.  Do not hesitate to notify your physician if your fever seems excessive.    Activity  Rest on the day of surgery then you may resume your normal  activity, as tolerated. Avoid heavy lifting for one week.    You may shower.  Do not douche or use tampons.  If you also had a D&C, do not resume intercourse until bleeding has ceased.    Emergency Care  Contact your physician if you have any of these problems:   1.  A fever over 101  Fahrenheit   2.  A large amount of bleeding or drainage   3.  Severe pain         [facility]  Discharge Instructions  Following Hysteroscopy    Activity  You may resume normal activities including lifting as needed.  It is permissible to climb stairs. You may drive after 24 hours as long as you are not taking narcotic pain pills.  Baths or showers are perfectly acceptable.      Vaginal Discharge  You may have some vaginal bleeding or discharge for about a week after procedure.  You may use tampons or pads.    Temperature  If you develop temperature elevations to over 101  Fahrenheit, your physician should be called immediately.    Diet  [place] or light diet is advisable the day of surgery.  If nausea persists, continue this diet.  If severe, call.    Follow-up  Make an appointment in 1-2 weeks if instructed to at: [phone]        [facility]  [address] [place] [address]  [phone]  HORTENSIA Oden MD       **If you have questions or concerns about your procedure,   call Dr. Masters at [phone]**  .

## 2017-07-20 NOTE — ANESTHESIA POSTPROCEDURE EVALUATION
Patient: Yulisa Rai    Procedure(s):  HYSTEROSCOPY WITH VERSA POINT MYOMECTOMY x 1, MULTIPLE POLYPECTOMIES ; LAPAROSCOPY FOR MYOMECTOMY X2 (<5cm) - Wound Class: II-Clean Contaminated   - Wound Class: I-Clean    Diagnosis:MYOMA   Diagnosis Additional Information: No value filed.    Anesthesia Type:  General    Note:  Anesthesia Post Evaluation    Patient location during evaluation: PACU  Patient participation: Able to fully participate in evaluation  Level of consciousness: awake  Pain management: adequate  Airway patency: patent  Cardiovascular status: acceptable  Respiratory status: acceptable  Hydration status: acceptable  PONV: controlled     Anesthetic complications: None          Last vitals:  Vitals:    07/20/17 0716 07/20/17 1005 07/20/17 1015   BP: 114/76  111/76   Pulse: 86     Resp: 16 30 24   Temp: 36.5  C (97.7  F) 36.2  C (97.1  F) 36.1  C (97  F)   SpO2: 98% 98% 100%         Electronically Signed By: Thai Bustos MD  July 20, 2017  11:01 AM

## 2017-07-20 NOTE — ANESTHESIA CARE TRANSFER NOTE
Patient: Yulisa Rai    Procedure(s):  HYSTEROSCOPY WITH VERSA POINT MYOMECTOMY x 1, MULTIPLE POLYPECTOMIES ; LAPAROSCOPY FOR MYOMECTOMY X2 (<5cm) - Wound Class: II-Clean Contaminated   - Wound Class: I-Clean    Diagnosis: MYOMA   Diagnosis Additional Information: No value filed.    Anesthesia Type:   General     Note:  Airway :Face Mask  Patient transferred to:PACU  Comments: Spontaneous respirations, tidal volume > 400, oxygen saturation > 97%, TOF 4/4 with > 5 seconds sustained tetany, follows commands.  Suctioned and extubated with cuff down to facemask.  Exchanging well.Transferred to PACU, spontaneous respirations, 10L oxygen via facemask.  All monitors and alarms on and functioning, VSS.  Patient awake, comfortable.  Report to PACU RN.      Vitals: (Last set prior to Anesthesia Care Transfer)    CRNA VITALS  7/20/2017 0930 - 7/20/2017 1008      7/20/2017             Pulse: 101    SpO2: 96 %    Resp Rate (set): 10                Electronically Signed By: PIETRO Castellano CRNA  July 20, 2017  10:08 AM

## 2017-07-21 LAB — COPATH REPORT: NORMAL

## 2017-07-23 NOTE — OP NOTE
Surgeon / Clinician: Josef Masters Jr, MD    Date of procedure:  07/20/2017    REASON FOR admission:  Multiple uterine myomas.     Preoperative status:  Yulisa Rai is a 37-year-old, who has a large intracavitary fibroid and a large pedunculated posterior intra-abdominal fibroid.  It also appears that there is an anterior subserosal fibroid on the left.      Operative procedures:    1.  Hysteroscopy.    2.  Myomectomy x1.  3.  Multiple polypectomies.  4.  Laparoscopy.  5.  Laparoscopic myomectomy x2 (myoma greater than 5 cm).    Details of procedure:  After general anesthesia was induced, the patient was placed in the dorsal lithotomy position and prepped and draped in the usual fashion.  A Polanco catheter was placed.  The cervix was grasped and carefully dilated.  The hysteroscope was placed.  Both tubal ostia were visualized and found to be normal.  There was a large posterior based intracavitary fibroid.  This was resected totally with excellent hemostasis.  There were also at least 10 polyps around the endometrial cavity that were all removed.  At the conclusion of the procedure the cavity was completely clean and very normal in appearance.  A uterine manipulator was placed.    Through a subumbilical incision, the Veress needle was placed and 3 liters of CO2 insufflated.  The laparoscope trocar was placed without difficulty.  Two 5 mm lower quadrant trocars were placed under direct vision without complication through Marcaine injected walker.  The above findings were noted.  Pitressin was injected in the base of each of the fibroids.  The subserosal left anterior fibroid was approached with a relaxing incision of the serosa.  We were then able to dissect out and doubly cauterize the blood supply to the myoma.  This was very superficial with no deep myometrial dissection.  This was placed in the posterior cul-de-sac.  We then turned our attention to the stalk of the large fibroid posteriorly.  This was  doubly cauterized and cut.    The left lower quadrant trocar site was expanded up to the morcellator.  Both fibroids were removed and all tissue submitted to the pathologist.  Copious irrigation was undertaken.  Upper abdomen exploration was completely negative.  Both surgical sites were inspected under fluid and oral pressure with no sign of any bleeding.  Again there was no deep myometrial dissection and no interruption anywhere close to the insertion of the fallopian tubes.    The left lower quadrant trocar site was closed at the peritoneum and fascia with 2-0 Vicryl.  The incisions were closed with 4 Vicryl.  Steri-Strips were placed.    ESTIMATED BLOOD LOSS:  Less than 10 cm.      Plan:  The patient will be discharged to home and given Percocet as needed for pain.  She is asked to call for any fever, chills, change in bowel or bladder function.  She will be seen in the office in 10-14 days.          Josef Masters Jr, MD    D:  07/20/2017 11:05 T:  07/23/2017 09:44  Document:  [id number] EB\SV    cc: [facility]  [address] [place] [id number]

## 2017-08-03 ENCOUNTER — OFFICE VISIT (OUTPATIENT)
Dept: OBGYN | Facility: CLINIC | Age: 37
End: 2017-08-03
Payer: COMMERCIAL

## 2017-08-03 VITALS — BODY MASS INDEX: 28.19 KG/M2 | SYSTOLIC BLOOD PRESSURE: 122 MMHG | DIASTOLIC BLOOD PRESSURE: 78 MMHG | WEIGHT: 180 LBS

## 2017-08-03 DIAGNOSIS — Z48.816 AFTERCARE FOLLOWING SURGERY OF THE GENITOURINARY SYSTEM: Primary | ICD-10-CM

## 2017-08-03 DIAGNOSIS — Z98.890 POSTOPERATIVE STATE: Primary | ICD-10-CM

## 2017-08-03 LAB — HGB BLD-MCNC: 14.9 G/DL (ref 11.7–15.7)

## 2017-08-03 PROCEDURE — 36415 COLL VENOUS BLD VENIPUNCTURE: CPT | Performed by: OBSTETRICS & GYNECOLOGY

## 2017-08-03 PROCEDURE — 99024 POSTOP FOLLOW-UP VISIT: CPT | Performed by: OBSTETRICS & GYNECOLOGY

## 2017-08-03 PROCEDURE — 85018 HEMOGLOBIN: CPT | Performed by: OBSTETRICS & GYNECOLOGY

## 2017-08-03 NOTE — PROGRESS NOTES
The patient seen for postoperative check. She had very minimal pain. Her pathology showed benign fibroids and polyps. Her incisions have healed very nicely and her hemoglobin is stable at 14.9 g percent. The patient will take 2 cycles off and start trying for pregnancy this fall.

## 2017-08-03 NOTE — MR AVS SNAPSHOT
"              After Visit Summary   8/3/2017    Yulisa Rai    MRN: [id number]           Patient Information     Date Of Birth          1980        Visit Information        Provider Department      8/3/2017 3:00 PM Josef Masters MD [facility] [place]        Today's Diagnoses     Postoperative state    -  1       Follow-ups after your visit        Who to contact     If you have questions or need follow up information about today's clinic visit or your schedule please contact [facility] directly at [phone].  Normal or non-critical lab and imaging results will be communicated to you by VUELOGIChart, letter or phone within 4 business days after the clinic has received the results. If you do not hear from us within 7 days, please contact the clinic through VUELOGIChart or phone. If you have a critical or abnormal lab result, we will notify you by phone as soon as possible.  Submit refill requests through [employer] or call your pharmacy and they will forward the refill request to us. Please allow 3 business days for your refill to be completed.          Additional Information About Your Visit        MyChart Information     [employer] lets you send messages to your doctor, view your test results, renew your prescriptions, schedule appointments and more. To sign up, go to [email or web address] . Click on \"Log in\" on the left side of the screen, which will take you to the Welcome page. Then click on \"Sign up Now\" on the right side of the page.     You will be asked to enter the access code listed below, as well as some personal information. Please follow the directions to create your username and password.     Your access code is: [email or web address]  Expires: 2017  4:26 PM     Your access code will  in 90 days. If you need help or a new code, please call your [place] clinic or [phone].        Care EveryWhere ID     This is your Care EveryWhere ID. This could be used by " other organizations to access your [place] medical records  [id number]        Your Vitals Were     Last Period Breastfeeding? BMI (Body Mass Index)             07/10/2017 (Exact Date) No 28.19 kg/m2          Blood Pressure from Last 3 Encounters:   08/03/17 122/78   07/20/17 99/66   06/27/17 118/76    Weight from Last 3 Encounters:   08/03/17 81.6 kg (180 lb)   07/20/17 85.2 kg (187 lb 14.4 oz)   06/27/17 83.4 kg (183 lb 12.8 oz)              Today, you had the following     No orders found for display       Primary Care Provider Office Phone # Fax #    Melvina Pennington [phone]       [place] [place] [address]        Equal Access to Services     [facility] : Hadii aad ku hadasho Sobharat, waaxda luqadaha, qaybta kaalmada ademarleniyada, shona arellano . So [facility] [phone].    ATENCIÓN: Si habla español, tiene a burkett disposición servicios gratuitos de asistencia lingüística. [facility] [phone].    We comply with applicable federal civil rights laws and [place] laws. We do not discriminate on the basis of race, color, national origin, age, disability sex, sexual orientation or gender identity.            Thank you!     Thank you for choosing [facility]  for your care. Our goal is always to provide you with excellent care. Hearing back from our patients is one way we can continue to improve our services. Please take a few minutes to complete the written survey that you may receive in the mail after your visit with us. Thank you!             Your Updated Medication List - Protect others around you: Learn how to safely use, store and throw away your medicines at www.disposemymeds.org.          This list is accurate as of: 8/3/17  3:10 PM.  Always use your most recent med list.                   Brand Name Dispense Instructions for use Diagnosis    albuterol 90 MCG/ACT inhaler     1 Inhaler    Inhale 2 puffs into the  lungs every 4 hours as needed for shortness of breath / dyspnea (or 20 minutes prior to excercise).    Intermittent asthma       ALPRAZolam 0.25 MG tablet    XANAX     Take 0.25 mg by mouth        VALTREX 1000 mg tablet   Generic drug:  valACYclovir     8    2 tabs po bid x 1 day    Herpes simplex without mention of complication

## 2017-10-03 ENCOUNTER — RADIANT APPOINTMENT (OUTPATIENT)
Dept: ULTRASOUND IMAGING | Facility: CLINIC | Age: 37
End: 2017-10-03
Attending: NURSE PRACTITIONER
Payer: COMMERCIAL

## 2017-10-03 ENCOUNTER — OFFICE VISIT (OUTPATIENT)
Dept: OBGYN | Facility: CLINIC | Age: 37
End: 2017-10-03
Payer: COMMERCIAL

## 2017-10-03 VITALS — SYSTOLIC BLOOD PRESSURE: 121 MMHG | WEIGHT: 183.4 LBS | BODY MASS INDEX: 28.72 KG/M2 | DIASTOLIC BLOOD PRESSURE: 82 MMHG

## 2017-10-03 DIAGNOSIS — N91.2 AMENORRHEA: Primary | ICD-10-CM

## 2017-10-03 DIAGNOSIS — N91.2 AMENORRHEA: ICD-10-CM

## 2017-10-03 LAB
B-HCG SERPL-ACNC: <1 IU/L (ref 0–5)
BETA HCG QUAL IFA URINE: NEGATIVE

## 2017-10-03 PROCEDURE — 99213 OFFICE O/P EST LOW 20 MIN: CPT | Performed by: NURSE PRACTITIONER

## 2017-10-03 PROCEDURE — 84702 CHORIONIC GONADOTROPIN TEST: CPT | Performed by: NURSE PRACTITIONER

## 2017-10-03 PROCEDURE — 84703 CHORIONIC GONADOTROPIN ASSAY: CPT | Performed by: NURSE PRACTITIONER

## 2017-10-03 PROCEDURE — 36415 COLL VENOUS BLD VENIPUNCTURE: CPT | Performed by: NURSE PRACTITIONER

## 2017-10-03 PROCEDURE — 76830 TRANSVAGINAL US NON-OB: CPT | Performed by: OBSTETRICS & GYNECOLOGY

## 2017-10-03 RX ORDER — TRIAMCINOLONE ACETONIDE 0.25 MG/G
CREAM TOPICAL 2 TIMES DAILY
COMMUNITY
End: 2018-03-14

## 2017-10-03 NOTE — PROGRESS NOTES
SUBJECTIVE:                                                   Yulisa Rai is a 37 year old female who presents to clinic today for the following health issue(s):  Patient presents with:  Consult: Missed period.      Additional information: Periods has always been normal, this month period was 8 days late.Patient is worried about ectopic pregnancy.     HPI: Patient had fibroids and polyps removed in 2016 with Dr. Masters.  She states her last 2 cycles were normal with timing and flow.  Having some nausea today.  UPT at home was negative.      Patient's last menstrual period was 2017 (exact date)..   Patient is sexually active, .  Using none for contraception.    reports that she has never smoked. She has never used smokeless tobacco.      STD testing offered?  Declined    Health maintenance updated:  yes    Today's PHQ-2 Score: No flowsheet data found.  Today's PHQ-9 Score:   PHQ-9 SCORE 2017   Total Score -   Total Score 0     Today's BRIJESH-7 Score:   BRIJESH-7 SCORE 2017   Total Score 2       Problem list and histories reviewed & adjusted, as indicated.  Additional history: as documented.    Patient Active Problem List   Diagnosis     Papanicolaou smear of cervix with atypical squamous cells of undetermined significance (ASC-US)     Personal history of other musculoskeletal disorders     Herpes simplex virus (HSV) infection     Cyst of Bartholin's gland     Papanicolaou smear of cervix with low grade squamous intraepithelial lesion (LGSIL)     Bunions     CARDIOVASCULAR SCREENING; LDL GOAL LESS THAN 160     Generalized anxiety disorder     Intermittent asthma     Past Surgical History:   Procedure Laterality Date     C NONSPECIFIC PROCEDURE      wisdom teeth extraction     C NONSPECIFIC PROCEDURE      rt. wrist fx     C NONSPECIFIC PROCEDURE  , 3/07    ASCUS, lgsil     C NONSPECIFIC PROCEDURE  3/03, 3/07    colposcopy     HYSTEROSCOPY, ABLATE ENDOMETRIUM VERSAPOINT , COMBINED  N/A 7/20/2017    Procedure: COMBINED HYSTEROSCOPY, ABLATE ENDOMETRIUM VERSAPOINT;  HYSTEROSCOPY WITH VERSA POINT MYOMECTOMY x 1, MULTIPLE POLYPECTOMIES ; LAPAROSCOPY FOR MYOMECTOMY X2 (<5cm);  Surgeon: Josef Masters MD;  Location: [facility]     LAPAROSCOPIC MYOMECTOMY UTERUS  7/20/2017    Procedure: LAPAROSCOPIC MYOMECTOMY UTERUS;;  Surgeon: Josef Masters MD;  Location: [facility]      Social History   Substance Use Topics     Smoking status: Never Smoker     Smokeless tobacco: Never Used     Alcohol use Yes      Comment: occas      Problem (# of Occurrences) Relation (Name,Age of Onset)    C.A.D. (2) Paternal Grandmother, Paternal Grandfather    Hypertension (2) Mother, Father    Lipids (2) Mother, Father            Current Outpatient Prescriptions   Medication Sig     triamcinolone (KENALOG) 0.025 % cream Apply topically 2 times daily     Fexofenadine HCl (ALLEGRA ALLERGY PO)      ALPRAZolam (XANAX) 0.25 MG tablet Take 0.25 mg by mouth     albuterol 90 MCG/ACT inhaler Inhale 2 puffs into the lungs every 4 hours as needed for shortness of breath / dyspnea (or 20 minutes prior to excercise).     VALTREX 1 GM OR TABS 2 tabs po bid x 1 day     No current facility-administered medications for this visit.      Allergies   Allergen Reactions     Percocet [Oxycodone-Acetaminophen] Hives and Itching       ROS:  12 point review of systems negative other than symptoms noted below.  Gastrointestinal: Nausea  Genitourinary: No Periods    OBJECTIVE:     /82  Wt 183 lb 6.4 oz (83.2 kg)  LMP 09/01/2017 (Exact Date)  BMI 28.72 kg/m2  Body mass index is 28.72 kg/(m^2).    Exam:  Constitutional:  Appearance: Well nourished, well developed alert, in no acute distress    Gynecological Ultrasound Report  Pel[facility] U/S - Trans[facility]aginal    [facility]      Referring Provider: Dr. Josef Masters      Sonographer: Fatou Palma [facility]      Indication: Late menses, recent myomectomy  LMP (mm/dd/yyyy): 09/01/17  History:    Gynecological Ultrasonography:   Uterus: retroverted  Size: 5.86 x 5.42 x 3.88cm.    Findings: Normal   Endometrium: Thickness total 4.72mm  Findings: Normal  Right Ovary: 4.64 x 4.11 x 2.82cm.    Findings: Right ovarian cyst= 4x 3.8x 3.1cm  Left Ovary: 2.04 x 1.87 x 1.13cm.   Cul de Sac/Pouch of El: FF near right ovary       Impression:right ovarian cyst, images personally reviewed              In-Clinic Test Results:  Results for orders placed or performed in visit on 10/03/17 (from the past 24 hour(s))   Beta HCG qual IFA urine - [facility] and [place]   Result Value Ref Range    Beta HCG Qual IFA Urine Negative NEG^Negative          ASSESSMENT/PLAN:                                                        ICD-10-CM    1. Amenorrhea N91.2 Beta HCG qual IFA urine - [facility] and [place]     US Transvaginal Non OB     HCG quantitative pregnancy       There are no Patient Instructions on file for this visit.    Dr. Masters to call and follow up with patient.    PIETRO Gonzalez [facility]

## 2017-10-03 NOTE — MR AVS SNAPSHOT
After Visit Summary   10/3/2017    Yulisa Rai    MRN: [id number]           Patient Information     Date Of Birth          1980        Visit Information        Provider Department      10/3/2017 8:00 AM Madeline Chiu APRN CNP [facility]        Today's Diagnoses     Amenorrhea    -  1       Follow-ups after your visit        Your next 10 appointments already scheduled     Oct 17, 2017  2:00 PM CDT   US PELVIC COMPLETE W TRANSVAGINAL with WEUS2   [facility] ([facility])    [address]  [place] [facility] [address]   [phone]           Please bring a list of your medicines (including vitamins, minerals and over-the-counter drugs). Also, tell your doctor about any allergies you may have. Wear comfortable clothes and leave your valuables at home.  Adults: Drink six 8-ounce glasses of fluid one hour before your exam. Do NOT empty your bladder.  If you need to empty your bladder before your exam, try to release only a little bit of urine. Then, drink another 8oz glass of fluid.  Children: Children who are potty trained should drink at least 4 cups (32 oz) of liquid 45 minutes to one hour prior to the exam. The child s bladder must be full in order to achieve a diagnostic exam. If your child is very uncomfortable or has an urgent need to pee, please notify a technologist; they will try to find out how much longer the wait may be and provide instructions to help relieve the pressure. Occasionally it is medically necessary to insert a urinary catheter to fill the bladder.  Please call the Imaging Department at your exam site with any questions.            Oct 17, 2017  2:30 PM CDT   SHORT with Josef Masters MD   [facility] ([facility]    [address]  [place] [id number] [phone]              Who to contact     If you have  "questions or need follow up information about today's clinic visit or your schedule please contact [facility] directly at [phone].  Normal or non-critical lab and imaging results will be communicated to you by MyChart, letter or phone within 4 business days after the clinic has received the results. If you do not hear from us within 7 days, please contact the clinic through [employer] or phone. If you have a critical or abnormal lab result, we will notify you by phone as soon as possible.  Submit refill requests through [employer] or call your pharmacy and they will forward the refill request to us. Please allow 3 business days for your refill to be completed.          Additional Information About Your Visit        [employer] Information     [facility] lets you send messages to your doctor, view your test results, renew your prescriptions, schedule appointments and more. To sign up, go to [email or web address] . Click on \"Log in\" on the left side of the screen, which will take you to the Welcome page. Then click on \"Sign up Now\" on the right side of the page.     You will be asked to enter the access code listed below, as well as some personal information. Please follow the directions to create your username and password.     Your access code is: [email or web address]  Expires: 2018  2:57 PM     Your access code will  in 90 days. If you need help or a new code, please call your [place] clinic or [phone].        Care EveryWhere ID     This is your Care EveryWhere ID. This could be used by other organizations to access your [place] medical records  [id number]        Your Vitals Were     Last Period BMI (Body Mass Index)                2017 (Exact Date) 28.72 kg/m2           Blood Pressure from Last 3 Encounters:   10/03/17 121/82   17 122/78   17 99/66    Weight from Last 3 Encounters:   10/03/17 183 lb 6.4 oz (83.2 kg)   17 180 lb (81.6 kg)   17 187 lb 14.4 oz " (85.2 kg)              We Performed the Following     Beta HCG qual [facility] urine - [facility] and [place]     HCG quantitative pregnancy        Primary Care Provider Office Phone # Fax #    Melvina Pennington [phone]       [place] [place] [address] [address] [address]        Equal Access to Services     [facility]: Hadii aad ku hadasho Soomaali, waaxda luqadaha, qaybta kaalmada adeegyada, waxay idiin hayaan adeeg kharash la'aan . So [facility] [phone].    ATENCIÓN: Si habla español, tiene a burkett disposición servicios gratuitos de asistencia lingüística. Llame al [phone].    We comply with applicable federal civil rights laws and [place] laws. We do not discriminate on the basis of race, color, national origin, age, disability, sex, sexual orientation, or gender identity.            Thank you!     Thank you for choosing [facility] [place]  for your care. Our goal is always to provide you with excellent care. Hearing back from our patients is one way we can continue to improve our services. Please take a few minutes to complete the written survey that you may receive in the mail after your visit with us. Thank you!             Your Updated Medication List - Protect others around you: Learn how to safely use, store and throw away your medicines at www.disposemymeds.org.          This list is accurate as of: 10/3/17  2:57 PM.  Always use your most recent med list.                   Brand Name Dispense Instructions for use Diagnosis    albuterol 90 MCG/ACT inhaler     1 Inhaler    Inhale 2 puffs into the lungs every 4 hours as needed for shortness of breath / dyspnea (or 20 minutes prior to excercise).    Intermittent asthma       ALLEGRA ALLERGY PO           ALPRAZolam 0.25 MG tablet    XANAX     Take 0.25 mg by mouth        triamcinolone 0.025 % cream    KENALOG     Apply topically 2 times daily        VALTREX 1000 mg tablet   Generic drug:  valACYclovir     8    2 tabs  po bid x 1 day    Herpes simplex without mention of complication

## 2017-10-17 ENCOUNTER — OFFICE VISIT (OUTPATIENT)
Dept: OBGYN | Facility: CLINIC | Age: 37
End: 2017-10-17
Payer: COMMERCIAL

## 2017-10-17 ENCOUNTER — RADIANT APPOINTMENT (OUTPATIENT)
Dept: ULTRASOUND IMAGING | Facility: CLINIC | Age: 37
End: 2017-10-17
Payer: COMMERCIAL

## 2017-10-17 VITALS — WEIGHT: 183 LBS | BODY MASS INDEX: 28.66 KG/M2 | DIASTOLIC BLOOD PRESSURE: 74 MMHG | SYSTOLIC BLOOD PRESSURE: 104 MMHG

## 2017-10-17 DIAGNOSIS — N91.2 AMENORRHEA: ICD-10-CM

## 2017-10-17 DIAGNOSIS — N83.201 CYST OF RIGHT OVARY: Primary | ICD-10-CM

## 2017-10-17 DIAGNOSIS — N83.201 CYST OF OVARY, RIGHT: ICD-10-CM

## 2017-10-17 PROCEDURE — 99213 OFFICE O/P EST LOW 20 MIN: CPT | Performed by: OBSTETRICS & GYNECOLOGY

## 2017-10-17 PROCEDURE — 76830 TRANSVAGINAL US NON-OB: CPT | Performed by: OBSTETRICS & GYNECOLOGY

## 2017-10-17 RX ORDER — MEDROXYPROGESTERONE ACETATE 10 MG
10 TABLET ORAL DAILY
Qty: 7 TABLET | Refills: 0 | Status: SHIPPED | OUTPATIENT
Start: 2017-10-17 | End: 2017-10-24

## 2017-10-17 NOTE — PROGRESS NOTES
SUBJECTIVE:                                                   Yulisa Rai is a 37 year old female who presents to clinic today for the following health issue(s):  Patient presents with:  Ultrasound: follow up ultrasound for ovarian cyst      HPI: The patient is seen at this time and follow-up of previous ovarian cyst. Ultrasound was performed today. The patient had a hysteroscopic myomectomy and polypectomy in 2017. She has not had a normal period in the last 6 weeks.      Patient's last menstrual period was 2017 (exact date)..   Patient is sexually active, .  Using none for contraception.    reports that she has never smoked. She has never used smokeless tobacco.      STD testing offered?  Declined    Health maintenance updated:  yes    Today's PHQ-2 Score: No flowsheet data found.  Today's PHQ-9 Score:   PHQ-9 SCORE 2017   Total Score -   Total Score 0     Today's BRIJESH-7 Score:   BRIJESH-7 SCORE 2017   Total Score 2       Problem list and histories reviewed & adjusted, as indicated.  Additional history: as documented.    Patient Active Problem List   Diagnosis     Papanicolaou smear of cervix with atypical squamous cells of undetermined significance (ASC-US)     Personal history of other musculoskeletal disorders     Herpes simplex virus (HSV) infection     Cyst of Bartholin's gland     Papanicolaou smear of cervix with low grade squamous intraepithelial lesion (LGSIL)     Bunions     CARDIOVASCULAR SCREENING; LDL GOAL LESS THAN 160     Generalized anxiety disorder     Intermittent asthma     Past Surgical History:   Procedure Laterality Date     C NONSPECIFIC PROCEDURE      wisdom teeth extraction     C NONSPECIFIC PROCEDURE      rt. wrist fx     C NONSPECIFIC PROCEDURE  , 3/07    ASCUS, lgsil     C NONSPECIFIC PROCEDURE  3/03, 3/07    colposcopy     HYSTEROSCOPY, ABLATE ENDOMETRIUM VERSAPOINT , COMBINED N/A 2017    Procedure: COMBINED HYSTEROSCOPY, ABLATE  ENDOMETRIUM VERSAPOINT;  HYSTEROSCOPY WITH VERSA POINT MYOMECTOMY x 1, MULTIPLE POLYPECTOMIES ; LAPAROSCOPY FOR MYOMECTOMY X2 (<5cm);  Surgeon: Josef Masters MD;  Location: [facility]     LAPAROSCOPIC MYOMECTOMY UTERUS  7/20/2017    Procedure: LAPAROSCOPIC MYOMECTOMY UTERUS;;  Surgeon: Josef Masters MD;  Location: [facility]      Social History   Substance Use Topics     Smoking status: Never Smoker     Smokeless tobacco: Never Used     Alcohol use Yes      Comment: occas      Problem (# of Occurrences) Relation (Name,Age of Onset)    C.A.D. (2) Paternal Grandmother, Paternal Grandfather    Hypertension (2) Mother, Father    Lipids (2) Mother, Father            Current Outpatient Prescriptions   Medication Sig     triamcinolone (KENALOG) 0.025 % cream Apply topically 2 times daily     Fexofenadine HCl (ALLEGRA ALLERGY PO)      ALPRAZolam (XANAX) 0.25 MG tablet Take 0.25 mg by mouth     albuterol 90 MCG/ACT inhaler Inhale 2 puffs into the lungs every 4 hours as needed for shortness of breath / dyspnea (or 20 minutes prior to excercise).     VALTREX 1 GM OR TABS 2 tabs po bid x 1 day     No current facility-administered medications for this visit.      Allergies   Allergen Reactions     Percocet [Oxycodone-Acetaminophen] Hives and Itching       ROS:  12 point review of systems negative other than symptoms noted below.  Genitourinary: Irregular Menses    OBJECTIVE:     LMP 09/01/2017 (Exact Date)  There is no height or weight on file to calculate BMI.    Exam:  Constitutional:  Appearance: Well nourished, well developed alert, in no acute distress  No Pelvic Exam performed     In-Clinic Test Results:  Results for orders placed or performed in visit on 10/17/17   US Transvaginal Non OB    Narrative    US Transvaginal Non OB    Order #: [id number] Accession #: [id number]         Study Notes        Samm Rodríguez on 10/17/2017  2:13 PM     Gynecological Ultrasound Report  Pelvic U/S - Transvaginal    [facility]  Referring Provider: DR ESQUEDA  Sonographer:  STEF CADENA  Indication: recheck for right ovarian cyst  LMP: Patient's last menstrual period was 09/01/2017 (exact date).  History: right ovarian cyst  Gynecological Ultrasonography:   Uterus: anteverted  Size: 7.49 x 3.88 x 3.56cm  Findings: Although previous ultrasound pictures showed retroverted uterus,   today's scan shows uterus to be anteverted. There is a single posterior   1.0cm mid-uterine subserosal myoma noted. Uterus is otherwise normal.   Endometrium: Thickness total 6.96mm  Findings: Appears normal  Right Ovary: 2.59 x 2.29 x 2.14cm   Findings: Previous right ovarian cyst has resolved. No right adnexal cyst,   mass or free fluid.   Left Ovary: 2.89 x 2.07 x 2.26cm  Cul de Sac/Pouch of El: no ff      Impression: Normal uterus, images reviewed     SONOGRAPHER NOTES TO READING PROVIDER:   No pelvic cyst, mass or free   fluid.                Discussed here         ASSESSMENT/PLAN:                                                      Patient with resolution of right ovarian cyst. She now has a functional cyst rising. The uterus looks normal with a normal endometrial cavity. She has a 1 cm subserosal posterior fibroid measured. The patient is sure that she is not pregnant at this time. She has negative pregnancy test. We will treat her with Provera to stimulate a withdrawal cycle and hopefully she will continue with regular cycles. If she has no bleeding at all from Provera we may need to look at her endometrium.            Josef Esqueda MD  [facility]

## 2017-10-17 NOTE — MR AVS SNAPSHOT
"              After Visit Summary   10/17/2017    Yulisa Rai    MRN: [id number]           Patient Information     Date Of Birth          1980        Visit Information        Provider Department      10/17/2017 2:30 PM Josef Masters MD [facility] [place]        Today's Diagnoses     Cyst of right ovary    -  1    Amenorrhea           Follow-ups after your visit        Who to contact     If you have questions or need follow up information about today's clinic visit or your schedule please contact [facility] directly at [phone].  Normal or non-critical lab and imaging results will be communicated to you by Aspyrahart, letter or phone within 4 business days after the clinic has received the results. If you do not hear from us within 7 days, please contact the clinic through Aspyrahart or phone. If you have a critical or abnormal lab result, we will notify you by phone as soon as possible.  Submit refill requests through Mashed jobs or call your pharmacy and they will forward the refill request to us. Please allow 3 business days for your refill to be completed.          Additional Information About Your Visit        MyChart Information     Mashed jobs lets you send messages to your doctor, view your test results, renew your prescriptions, schedule appointments and more. To sign up, go to [email or web address] . Click on \"Log in\" on the left side of the screen, which will take you to the Welcome page. Then click on \"Sign up Now\" on the right side of the page.     You will be asked to enter the access code listed below, as well as some personal information. Please follow the directions to create your username and password.     Your access code is: [email or web address]  Expires: 2018  2:57 PM     Your access code will  in 90 days. If you need help or a new code, please call your [place] clinic or [phone].        Care EveryWhere ID     This is your Care EveryWhere ID. " This could be used by other organizations to access your [place] medical records  [id number]        Your Vitals Were     Last Period Breastfeeding? BMI (Body Mass Index)             09/01/2017 (Exact Date) No 28.66 kg/m2          Blood Pressure from Last 3 Encounters:   10/17/17 104/74   10/03/17 121/82   08/03/17 122/78    Weight from Last 3 Encounters:   10/17/17 183 lb (83 kg)   10/03/17 183 lb 6.4 oz (83.2 kg)   08/03/17 180 lb (81.6 kg)              Today, you had the following     No orders found for display         Today's Medication Changes          These changes are accurate as of: 10/17/17  2:51 PM.  If you have any questions, ask your nurse or doctor.               Start taking these medicines.        Dose/Directions    medroxyPROGESTERone 10 MG tablet   Commonly known as:  PROVERA   Used for:  Amenorrhea   Started by:  Josef Masters MD        Dose:  10 mg   Take 1 tablet (10 mg) by mouth daily for 7 days   Quantity:  7 tablet   Refills:  0            Where to get your medicines      These medications were sent to [facility] [address] [place] [address] [address] [facility] [address] [facility] [address]     Phone:  [phone]     medroxyPROGESTERone 10 MG tablet                Primary Care Provider Office Phone # Fax #    Melvina ROSENDO Pennington [phone]       [facility] [address] [place] [address]        Equal Access to Services     AYDEE MAJOR AH: Hadcamron muse Sobharat, waaxda luqadaha, qaybta kaalmada shona ferguson. So [facility] [phone].    ATENCIÓN: Si habla español, tiene a burkett disposición servicios gratuitos de asistencia lingüística. Llame al [phone].    We comply with applicable federal civil rights laws and [place] laws. We do not discriminate on the basis of race, color, national origin, age, disability, sex, sexual orientation, or gender identity.             Thank you!     Thank you for choosing [facility] [place]  for your care. Our goal is always to provide you with excellent care. Hearing back from our patients is one way we can continue to improve our services. Please take a few minutes to complete the written survey that you may receive in the mail after your visit with us. Thank you!             Your Updated Medication List - Protect others around you: Learn how to safely use, store and throw away your medicines at www.disposemymeds.org.          This list is accurate as of: 10/17/17  2:51 PM.  Always use your most recent med list.                   Brand Name Dispense Instructions for use Diagnosis    albuterol 90 MCG/ACT inhaler     1 Inhaler    Inhale 2 puffs into the lungs every 4 hours as needed for shortness of breath / dyspnea (or 20 minutes prior to excercise).    Intermittent asthma       ALLEGRA ALLERGY PO           ALPRAZolam 0.25 MG tablet    XANAX     Take 0.25 mg by mouth        medroxyPROGESTERone 10 MG tablet    PROVERA    7 tablet    Take 1 tablet (10 mg) by mouth daily for 7 days    Amenorrhea       triamcinolone 0.025 % cream    KENALOG     Apply topically 2 times daily        VALTREX 1000 mg tablet   Generic drug:  valACYclovir     8    2 tabs po bid x 1 day    Herpes simplex without mention of complication

## 2017-12-26 ENCOUNTER — TRANSFERRED RECORDS (OUTPATIENT)
Dept: HEALTH INFORMATION MANAGEMENT | Facility: CLINIC | Age: 37
End: 2017-12-26

## 2018-01-10 ENCOUNTER — RADIANT APPOINTMENT (OUTPATIENT)
Dept: ULTRASOUND IMAGING | Facility: CLINIC | Age: 38
End: 2018-01-10
Payer: COMMERCIAL

## 2018-01-10 ENCOUNTER — PRENATAL OFFICE VISIT (OUTPATIENT)
Dept: OBGYN | Facility: CLINIC | Age: 38
End: 2018-01-10
Payer: COMMERCIAL

## 2018-01-10 ENCOUNTER — TRANSFERRED RECORDS (OUTPATIENT)
Dept: HEALTH INFORMATION MANAGEMENT | Facility: CLINIC | Age: 38
End: 2018-01-10

## 2018-01-10 VITALS
DIASTOLIC BLOOD PRESSURE: 64 MMHG | HEART RATE: 98 BPM | WEIGHT: 193.6 LBS | SYSTOLIC BLOOD PRESSURE: 110 MMHG | HEIGHT: 67 IN | BODY MASS INDEX: 30.39 KG/M2

## 2018-01-10 DIAGNOSIS — O09.91 SUPERVISION OF HIGH RISK PREGNANCY IN FIRST TRIMESTER: Primary | ICD-10-CM

## 2018-01-10 DIAGNOSIS — O26.21 HABITUAL ABORTION HISTORY, ANTEPARTUM, FIRST TRIMESTER: ICD-10-CM

## 2018-01-10 DIAGNOSIS — O09.521 AMA (ADVANCED MATERNAL AGE) MULTIGRAVIDA 35+, FIRST TRIMESTER: ICD-10-CM

## 2018-01-10 LAB
ABO + RH BLD: NORMAL
ABO + RH BLD: NORMAL
ALBUMIN UR-MCNC: NEGATIVE MG/DL
APPEARANCE UR: CLEAR
BACTERIA #/AREA URNS HPF: ABNORMAL /HPF
BASOPHILS # BLD AUTO: 0 10E9/L (ref 0–0.2)
BASOPHILS NFR BLD AUTO: 0.2 %
BILIRUB UR QL STRIP: NEGATIVE
BLD GP AB SCN SERPL QL: NORMAL
BLOOD BANK CMNT PATIENT-IMP: NORMAL
COLOR UR AUTO: YELLOW
DIFFERENTIAL METHOD BLD: NORMAL
EOSINOPHIL # BLD AUTO: 0.1 10E9/L (ref 0–0.7)
EOSINOPHIL NFR BLD AUTO: 0.9 %
ERYTHROCYTE [DISTWIDTH] IN BLOOD BY AUTOMATED COUNT: 12.3 % (ref 10–15)
GLUCOSE UR STRIP-MCNC: NEGATIVE MG/DL
HCT VFR BLD AUTO: 39.8 % (ref 35–47)
HGB BLD-MCNC: 13.6 G/DL (ref 11.7–15.7)
HGB UR QL STRIP: NEGATIVE
KETONES UR STRIP-MCNC: NEGATIVE MG/DL
LEUKOCYTE ESTERASE UR QL STRIP: NEGATIVE
LYMPHOCYTES # BLD AUTO: 1.3 10E9/L (ref 0.8–5.3)
LYMPHOCYTES NFR BLD AUTO: 12.7 %
MCH RBC QN AUTO: 31.1 PG (ref 26.5–33)
MCHC RBC AUTO-ENTMCNC: 34.2 G/DL (ref 31.5–36.5)
MCV RBC AUTO: 91 FL (ref 78–100)
MONOCYTES # BLD AUTO: 0.5 10E9/L (ref 0–1.3)
MONOCYTES NFR BLD AUTO: 5.2 %
NEUTROPHILS # BLD AUTO: 8 10E9/L (ref 1.6–8.3)
NEUTROPHILS NFR BLD AUTO: 81 %
NITRATE UR QL: NEGATIVE
NON-SQ EPI CELLS #/AREA URNS LPF: ABNORMAL /LPF
PH UR STRIP: 7 PH (ref 5–7)
PLATELET # BLD AUTO: 163 10E9/L (ref 150–450)
RBC # BLD AUTO: 4.38 10E12/L (ref 3.8–5.2)
RBC #/AREA URNS AUTO: ABNORMAL /HPF
SOURCE: ABNORMAL
SP GR UR STRIP: 1.01 (ref 1–1.03)
SPECIMEN EXP DATE BLD: NORMAL
UROBILINOGEN UR STRIP-ACNC: 0.2 EU/DL (ref 0.2–1)
WBC # BLD AUTO: 9.9 10E9/L (ref 4–11)
WBC #/AREA URNS AUTO: ABNORMAL /HPF

## 2018-01-10 PROCEDURE — 86901 BLOOD TYPING SEROLOGIC RH(D): CPT | Performed by: OBSTETRICS & GYNECOLOGY

## 2018-01-10 PROCEDURE — 81001 URINALYSIS AUTO W/SCOPE: CPT | Performed by: OBSTETRICS & GYNECOLOGY

## 2018-01-10 PROCEDURE — 36415 COLL VENOUS BLD VENIPUNCTURE: CPT | Performed by: OBSTETRICS & GYNECOLOGY

## 2018-01-10 PROCEDURE — 40000791 ZZHCL STATISTIC VERIFI PRENATAL TRISOMY 21,18,13: Mod: 90 | Performed by: OBSTETRICS & GYNECOLOGY

## 2018-01-10 PROCEDURE — 86780 TREPONEMA PALLIDUM: CPT | Performed by: OBSTETRICS & GYNECOLOGY

## 2018-01-10 PROCEDURE — 86850 RBC ANTIBODY SCREEN: CPT | Performed by: OBSTETRICS & GYNECOLOGY

## 2018-01-10 PROCEDURE — 87389 HIV-1 AG W/HIV-1&-2 AB AG IA: CPT | Performed by: OBSTETRICS & GYNECOLOGY

## 2018-01-10 PROCEDURE — 86762 RUBELLA ANTIBODY: CPT | Performed by: OBSTETRICS & GYNECOLOGY

## 2018-01-10 PROCEDURE — 99207 ZZC FIRST OB VISIT: CPT | Performed by: OBSTETRICS & GYNECOLOGY

## 2018-01-10 PROCEDURE — 86900 BLOOD TYPING SEROLOGIC ABO: CPT | Performed by: OBSTETRICS & GYNECOLOGY

## 2018-01-10 PROCEDURE — 87340 HEPATITIS B SURFACE AG IA: CPT | Performed by: OBSTETRICS & GYNECOLOGY

## 2018-01-10 PROCEDURE — 87086 URINE CULTURE/COLONY COUNT: CPT | Performed by: OBSTETRICS & GYNECOLOGY

## 2018-01-10 PROCEDURE — 99000 SPECIMEN HANDLING OFFICE-LAB: CPT | Performed by: OBSTETRICS & GYNECOLOGY

## 2018-01-10 PROCEDURE — 85025 COMPLETE CBC W/AUTO DIFF WBC: CPT | Performed by: OBSTETRICS & GYNECOLOGY

## 2018-01-10 PROCEDURE — 76817 TRANSVAGINAL US OBSTETRIC: CPT | Performed by: OBSTETRICS & GYNECOLOGY

## 2018-01-10 RX ORDER — PRENATAL VIT/IRON FUM/FOLIC AC 27MG-0.8MG
1 TABLET ORAL DAILY
COMMUNITY
End: 2019-08-20

## 2018-01-10 ASSESSMENT — PATIENT HEALTH QUESTIONNAIRE - PHQ9
5. POOR APPETITE OR OVEREATING: NOT AT ALL
SUM OF ALL RESPONSES TO PHQ QUESTIONS 1-9: 1

## 2018-01-10 ASSESSMENT — ANXIETY QUESTIONNAIRES
GAD7 TOTAL SCORE: 2
2. NOT BEING ABLE TO STOP OR CONTROL WORRYING: SEVERAL DAYS
3. WORRYING TOO MUCH ABOUT DIFFERENT THINGS: NOT AT ALL
7. FEELING AFRAID AS IF SOMETHING AWFUL MIGHT HAPPEN: NOT AT ALL
5. BEING SO RESTLESS THAT IT IS HARD TO SIT STILL: NOT AT ALL
6. BECOMING EASILY ANNOYED OR IRRITABLE: NOT AT ALL
IF YOU CHECKED OFF ANY PROBLEMS ON THIS QUESTIONNAIRE, HOW DIFFICULT HAVE THESE PROBLEMS MADE IT FOR YOU TO DO YOUR WORK, TAKE CARE OF THINGS AT HOME, OR GET ALONG WITH OTHER PEOPLE: NOT DIFFICULT AT ALL
1. FEELING NERVOUS, ANXIOUS, OR ON EDGE: SEVERAL DAYS

## 2018-01-10 NOTE — MR AVS SNAPSHOT
After Visit Summary   1/10/2018    Yulisa Rai    MRN: [id number]           Patient Information     Date Of Birth          1980        Visit Information        Provider Department      1/10/2018 11:30 AM Lorraine Patino MD; [facility] [place]        Today's Diagnoses     Supervision of high risk pregnancy in first trimester    -  1    Habitual  history, antepartum, first trimester        AMA (advanced maternal age) multigravida 35+, first trimester           Follow-ups after your visit        Additional Services     MAT FETAL MED CTR REFERRAL-PREGNANCY       >> Patient may proceed with recommendations for further testing as directed by the Maternal Fetal Medicine Specialist >>    >> If requesting Fetal Echo: MFM will determine appropriate location for exam due to indication.    >> If requesting Lung Maturity Amnio:  If results indicate fetal lung maturity, induction or C/S is recommended within 36 hours.  Please schedule accordingly.     Dear Patient:   Please be aware that coverage of these services is subject to the terms and limitations of your health insurance plan.  Call member services at your health plan with any benefit or coverage questions.      Please bring the following to your appointment:    >>  Any x-rays, CTs or MRIs which have been performed.  Contact the facility where they were done to arrange for  prior to your scheduled appointment.  Any new CT, MRI or other procedures ordered by your specialist must be performed at a [place] facility or coordinated by your clinic's referral office.  >>  List of current medications   >>  This referral request   >>  Any documents/labs given to you for this referral                  Future tests that were ordered for you today     Open Future Orders        Priority Expected Expires Ordered    [facility] Genetic Counseling Routine  2019 1/10/2018    [facility] Comprehensive Single Routine  11/10/2018  "1/10/2018            Who to contact     If you have questions or need follow up information about today's clinic visit or your schedule please contact [facility] directly at [phone].  Normal or non-critical lab and imaging results will be communicated to you by MyChart, letter or phone within 4 business days after the clinic has received the results. If you do not hear from us within 7 days, please contact the clinic through MyChart or phone. If you have a critical or abnormal lab result, we will notify you by phone as soon as possible.  Submit refill requests through Ranovus or call your pharmacy and they will forward the refill request to us. Please allow 3 business days for your refill to be completed.          Additional Information About Your Visit        [employer] Information     Ranovus gives you secure access to your electronic health record. If you see a primary care provider, you can also send messages to your care team and make appointments. If you have questions, please call your primary care clinic.  If you do not have a primary care provider, please call [phone] and they will assist you.        Care EveryWhere ID     This is your Care EveryWhere ID. This could be used by other organizations to access your [place] medical records  [id number]        Your Vitals Were     Pulse Height Last Period BMI (Body Mass Index)          98 5' 7\" (1.702 m) 11/01/2017 30.32 kg/m2         Blood Pressure from Last 3 Encounters:   01/10/18 110/64   10/17/17 104/74   10/03/17 121/82    Weight from Last 3 Encounters:   01/10/18 193 lb 9.6 oz (87.8 kg)   10/17/17 183 lb (83 kg)   10/03/17 183 lb 6.4 oz (83.2 kg)              We Performed the Following     ABO/Rh type and screen     Anti Treponema     CBC with platelets differential     Hepatitis B surface antigen     HIV Antigen Antibody Combo     MAT FETAL MED CTR REFERRAL-PREGNANCY     Non Invasive Prenatal Test Cell Free DNA     Rubella " Antibody IgG Quantitative     UA with Microscopic     Urine Culture Aerobic Bacterial        Primary Care Provider Office Phone # Fax #    Melvina Pennington [phone]       [facility] [id number] [place] [address]        Equal Access to Services     KEYURELISHA PEDRITO : Hadii aad ku hadasho Soomaali, waaxda luqadaha, qaybta kaalmada adeegyada, waxay idiin hayangen ademarleni khander laJoeherminia evan. So [facility] [phone].    ATENCIÓN: Si habla español, tiene a burkett disposición servicios gratuitos de asistencia lingüística. Llame al [phone].    We comply with applicable federal civil rights laws and [place] laws. We do not discriminate on the basis of race, color, national origin, age, disability, sex, sexual orientation, or gender identity.            Thank you!     Thank you for choosing [facility] [place]  for your care. Our goal is always to provide you with excellent care. Hearing back from our patients is one way we can continue to improve our services. Please take a few minutes to complete the written survey that you may receive in the mail after your visit with us. Thank you!             Your Updated Medication List - Protect others around you: Learn how to safely use, store and throw away your medicines at www.disposemymeds.org.          This list is accurate as of: 1/10/18  3:47 PM.  Always use your most recent med list.                   Brand Name Dispense Instructions for use Diagnosis    albuterol 90 MCG/ACT inhaler     1 Inhaler    Inhale 2 puffs into the lungs every 4 hours as needed for shortness of breath / dyspnea (or 20 minutes prior to excercise).    Intermittent asthma       ALLEGRA ALLERGY PO           ALPRAZolam 0.25 MG tablet    XANAX     Take 0.25 mg by mouth        ASPIRIN PO      Take 81 mg by mouth daily    Supervision of high risk pregnancy in first trimester       prenatal multivitamin plus iron 27-0.8 MG Tabs per tablet      Take 1 tablet by mouth  daily    Supervision of high risk pregnancy in first trimester       triamcinolone 0.025 % cream    KENALOG     Apply topically 2 times daily        VALTREX 1000 mg tablet   Generic drug:  valACYclovir     8    2 tabs po bid x 1 day    Herpes simplex without mention of complication

## 2018-01-10 NOTE — PROGRESS NOTES
This is a 37 year old female patient,   who presents for her first obstetrical visit.    Patient's last menstrual period was 2017..  This gives her an EDC of Aug 8, 2018 .  She is 10w0d weeks.  Her cycles are regular.  Her last menstrual period was normal.  She has had an ultrasound on 2017 which showed viable IUP 8weeks. .  Since her LMP, she has experienced  fatigue and weight gain, breast tenderness, consdtipation, skin dryness).  She denies emesis, abdominal pain, headache, loss of appetite, vaginal discharge, dysuria, pelvic pain, urinary urgency, lightheadedness, vaginal bleeding and hemorrhoids.    Patient has had 3 early miscarriages, less than 8 wks   Chromosomes were normal on last 2 and also on both parents  Patient had a low amh, 0.6  This was referred by repro endo  This was a spontaneous pregnancy, not ivf  They stopped her progesterone meds  Is still on daily baby asa  Patient has extreme anxiety about her history  Would like to repeat her viability ultrasound and I agree  Wants inatal testing  Plan level II us   History of oral herpes but never genital lesions  History of abn pap but last one was normal & HPV neg  Childhood asthma but no recent issues, hasn't needed inhaler  Had myomectomies with Dr Masters this summer but op report indicates not deep incision so should be able to labor          Past History:  Her past medical history   Past Medical History:   Diagnosis Date     Bunions     shireen     Cyst of Bartholin's gland     s/p word catheter placement     BRIJESH (generalised anxiety disorder)     celexa     Herpes simplex without mention of complication     cold sores     Hx musculoskletl dis NEC     rt thoracic back pain/tingling     Intermittent asthma     excercise induced-mild     Papanicolaou smear of cervix with atypical squamous cells of undetermined significance (ASC-US)     s/p colposcopy nl , pos hpv 58 high risk     Papanicolaou smear of cervix with low grade  squamous intraepithelial lesion (LGSIL) 3/07    mild dysplasia, hpv, mane 1 s/p neg colposcopy with ecc and cervical bx 3/07 repeat pap q 6 mos x 2   .      She has a history of  miscarriage    Since her last LMP she denies use of alcohol, tobacco and street drugs.    HISTORY:  Obstetric History       T0      L0     SAB0   TAB0   Ectopic0   Multiple0   Live Births0       # Outcome Date GA Lbr Chet/2nd Weight Sex Delivery Anes PTL Lv   1 Current                 Past Medical History:   Diagnosis Date     Bunions     shireen     Cyst of Bartholin's gland     s/p word catheter placement     BRIJESH (generalised anxiety disorder)     celexa     Herpes simplex without mention of complication     cold sores     Hx musculoskletl dis NEC     rt thoracic back pain/tingling     Intermittent asthma     excercise induced-mild     Papanicolaou smear of cervix with atypical squamous cells of undetermined significance (ASC-US)     s/p colposcopy nl , pos hpv 58 high risk     Papanicolaou smear of cervix with low grade squamous intraepithelial lesion (LGSIL) 3/07    mild dysplasia, hpv, mane 1 s/p neg colposcopy with ecc and cervical bx 3/07 repeat pap q 6 mos x 2     Past Surgical History:   Procedure Laterality Date     C NONSPECIFIC PROCEDURE      wisdom teeth extraction     C NONSPECIFIC PROCEDURE      rt. wrist fx     C NONSPECIFIC PROCEDURE  , 3/07    ASCUS, lgsil     C NONSPECIFIC PROCEDURE  3/03, 3/07    colposcopy     D & C  ,      GYN SURGERY  2017    ibroid removed     HEAD & NECK SURGERY      wisdom teeth     HYSTEROSCOPY, ABLATE ENDOMETRIUM VERSAPOINT , COMBINED N/A 2017    Procedure: COMBINED HYSTEROSCOPY, ABLATE ENDOMETRIUM VERSAPOINT;  HYSTEROSCOPY WITH VERSA POINT MYOMECTOMY x 1, MULTIPLE POLYPECTOMIES ; LAPAROSCOPY FOR MYOMECTOMY X2 (<5cm);  Surgeon: Josef Masters MD;  Location: [facility]     LAPAROSCOPIC MYOMECTOMY UTERUS  2017    Procedure: LAPAROSCOPIC MYOMECTOMY UTERUS;;   Surgeon: Josef Masters MD;  Location: [facility]     Family History   Problem Relation Age of Onset     Lipids Mother      Hypertension Mother      Hypertension Father      Lipids Father      C.A.D. Paternal Grandmother      C.A.D. Paternal Grandfather      Social History     Social History     Marital status:      Spouse name: N/A     Number of children: N/A     Years of education: N/A     Social History Main Topics     Smoking status: Never Smoker     Smokeless tobacco: Never Used     Alcohol use No      Comment: none since pg     Drug use: No     Sexual activity: Yes     Partners: Male     Birth control/ protection: None     Other Topics Concern      Service No     Blood Transfusions No     Caffeine Concern No     2 cups/day     Occupational Exposure No     Hobby Hazards No     Sleep Concern No     using trazodone     Stress Concern No     Weight Concern No     Special Diet Yes     weight watchers and protein diet (sees nutritionist)     Back Care No     Exercise Yes     cardio, 3d/wk     Bike Helmet No     n/a     Seat Belt Yes     Self-Exams No     Social History Narrative    going to [employer] fall 2005, graduating spring 2008        Passed bar exam, working as a  for  in [employer]. Broke up with boyfriend 10/08.         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     Boyfriend x 1 yr.         Training for 1/2 marathon      Current Outpatient Prescriptions   Medication Sig     Prenatal Vit-Fe Fumarate-FA (PRENATAL MULTIVITAMIN PLUS IRON) 27-0.8 MG TABS per tablet Take 1 tablet by mouth daily     ASPIRIN PO Take 81 mg by mouth daily     albuterol 90 MCG/ACT inhaler Inhale 2 puffs into the lungs every 4 hours as needed for shortness of breath / dyspnea (or 20 minutes prior to excercise).     triamcinolone (KENALOG) 0.025 % cream Apply topically 2 times daily     Fexofenadine HCl (ALLEGRA ALLERGY PO)   "    ALPRAZolam (XANAX) 0.25 MG tablet Take 0.25 mg by mouth     VALTREX 1 GM OR TABS 2 tabs po bid x 1 day (Patient not taking: No sig reported)     No current facility-administered medications for this visit.      No Active Allergies    Past medical, surgical, social and family history were reviewed and updated in EPIC.    ROS:   12 point review of systems negative other than symptoms noted below.  Constitutional: Fatigue and Weight Gain  Breast: Tenderness  Gastrointestinal: Constipation and Nausea  Skin: Skin Dryness    EXAM:  /64  Pulse 98  Ht 5' 7\" (1.702 m)  Wt 193 lb 9.6 oz (87.8 kg)  LMP 2017  BMI 30.32 kg/m2   BMI: Body mass index is 30.32 kg/(m^2).    EXAM:  Constitutional:  Appearance: Well nourished, well developed alert, in no acute distress.  Neck:   Lymph Nodes:  No lymphadenopathy present.    Thyroid:  Gland size normal, nontender, no nodules or masses present  on palpation.  Chest:  Respiratory Effort:  Breathing unlabored.    Lymphatic: Lymph Nodes:  No other lymphadenopathy present.  Skin:  General Inspection:  No rashes present, no lesions present, no areas of  discoloration.      Neurologic/Psychiatric:    Mental Status:  Oriented X3.    No Pelvic Exam performed    ASSESSMENT:      ICD-10-CM    1. Supervision of high risk pregnancy in first trimester O09.91 UA with Microscopic     Urine Culture Aerobic Bacterial     ABO/Rh type and screen     Anti Treponema     CBC with platelets differential     Hepatitis B surface antigen     HIV Antigen Antibody Combo     Rubella Antibody IgG Quantitative     Prenatal Vit-Fe Fumarate-FA (PRENATAL MULTIVITAMIN PLUS IRON) 27-0.8 MG TABS per tablet     ASPIRIN PO     MAT FETAL MED CTR REFERRAL-PREGNANCY   2. Habitual  history, antepartum, first trimester O26.21 US OB Transvaginal Only   3. AMA (advanced maternal age) multigravida 35+, first trimester O09.521 MAT FETAL MED CTR REFERRAL-PREGNANCY       PLAN/PATIENT INSTRUCTIONS:    There " are no Patient Instructions on file for this visit.    We discussed option of purchasing a doptone to use at home once she gets into second trimester, might help her anxiety  Plan level ii  inatal today  Prenatal labs today  Viability ultrasound today was normal  Return 2 wks    Lorraine Patino MD

## 2018-01-10 NOTE — NURSING NOTE
[facility] Obstetrical Risk History    Patient presents for new OB labs and teaching.      1. Please indicate any condition you have or have had in the past:  Asthma  2. Abnormal Pap , miscarriage x3, anxiety, herpes  3.   4. Do you smoke?  No  If yes, how many packs/day?   5. Do you drink alcoholic beverages? No  If yes, how often?  What type?   6. List any medications taken since your last period: Aspirin, prenatal,   7. List any recreational drugs (cocaine, marijuana, etc. used since your last period:None    8. List any chemical or radiation exposure that you've encountered: yes airport screening  9. Are you on a restricted diet? No  If yes, please describe:  Do you have any Mormonism objections to any form of treatment? No    GENETIC SCREENING    These questions apply to you, the baby's father, or anyone in either family with:    1. Patient's age 35 or greater at delivery? Yes  2. Welsh, Pakistani, Mediterranean ancestry? Yes- Welsh  3. Neural Tube Defect (Meningomyelocele, Spina Bifida, or Anencephaly)? No  4. Latter-day, Faroese [place] or history of Kranthi-Sachs disease? No  5. Down's Syndrome?   No  6. Hemophilia or clotting disorder? No  7. Muscular Dystrophy? No  8. Cystic Fibrosis? No  9. Dano's Chorea? No  10. Mental Retardation? No  11. 3 or more miscarriages or a stillborn? Yes  12. Other inherited disease or chromosomal disorder? No  13. Have you or the baby's father had a child born with a birth defect? No  14. Did you or the baby's father have a birth defect yourselves? No    Do you have any other concerns about birth defects? No

## 2018-01-11 LAB
BACTERIA SPEC CULT: NORMAL
HBV SURFACE AG SERPL QL IA: NONREACTIVE
HIV 1+2 AB+HIV1 P24 AG SERPL QL IA: NONREACTIVE
Lab: NORMAL
RUBV IGG SERPL IA-ACNC: 6 IU/ML
SPECIMEN SOURCE: NORMAL
T PALLIDUM IGG+IGM SER QL: NEGATIVE

## 2018-01-11 ASSESSMENT — ANXIETY QUESTIONNAIRES: GAD7 TOTAL SCORE: 2

## 2018-01-18 ENCOUNTER — TELEPHONE (OUTPATIENT)
Dept: NURSING | Facility: CLINIC | Age: 38
End: 2018-01-18

## 2018-01-18 PROBLEM — O09.511 ENCOUNTER FOR SUPERVISION OF PRIMIGRAVIDA OF ADVANCED MATERNAL AGE IN FIRST TRIMESTER: Status: ACTIVE | Noted: 2018-01-18

## 2018-01-18 NOTE — TELEPHONE ENCOUNTER
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 Did want to know gender. Informed Female

## 2018-01-23 ENCOUNTER — RADIANT APPOINTMENT (OUTPATIENT)
Dept: ULTRASOUND IMAGING | Facility: CLINIC | Age: 38
End: 2018-01-23
Payer: COMMERCIAL

## 2018-01-23 ENCOUNTER — PRENATAL OFFICE VISIT (OUTPATIENT)
Dept: OBGYN | Facility: CLINIC | Age: 38
End: 2018-01-23
Payer: COMMERCIAL

## 2018-01-23 VITALS — WEIGHT: 192 LBS | BODY MASS INDEX: 30.07 KG/M2 | SYSTOLIC BLOOD PRESSURE: 122 MMHG | DIASTOLIC BLOOD PRESSURE: 68 MMHG

## 2018-01-23 DIAGNOSIS — O09.511 ENCOUNTER FOR SUPERVISION OF PRIMIGRAVIDA OF ADVANCED MATERNAL AGE IN FIRST TRIMESTER: ICD-10-CM

## 2018-01-23 DIAGNOSIS — O36.80X1 ENCOUNTER TO DETERMINE FETAL VIABILITY OF PREGNANCY, FETUS 1: ICD-10-CM

## 2018-01-23 PROCEDURE — 76815 OB US LIMITED FETUS(S): CPT | Performed by: OBSTETRICS & GYNECOLOGY

## 2018-01-23 PROCEDURE — 99207 ZZC PRENATAL VISIT: CPT | Performed by: OBSTETRICS & GYNECOLOGY

## 2018-01-23 NOTE — MR AVS SNAPSHOT
After Visit Summary   1/23/2018    Yulisa Rai    MRN: [id number]           Patient Information     Date Of Birth          1980        Visit Information        Provider Department      1/23/2018 11:40 AM Moisés Starr MD [facility] [place]        Today's Diagnoses     Encounter for supervision of primigravida of advanced maternal age in first trimester           Follow-ups after your visit        Your next 10 appointments already scheduled     Jan 23, 2018 11:40 AM CST   ESTABLISHED PRENATAL with Moisés Starr MD   [facility] ([facility])    [address] [facility] [address]  [facility] [address]   [phone]            Feb 13, 2018  8:00 AM CST   ESTABLISHED PRENATAL with Lorraine Patino MD   [facility] ([facility])    [address] [place] [address]   [phone]            Mar 12, 2018  8:45 AM CDT   Genetic Counseling with UNRULY GEN COUNSELOR 1   [facility] M[facility] Fetal [facility])    [address] [place] [address]   [phone]            Mar 12, 2018  9:30 AM CDT   (Arrive by 9:15 AM)   JOSE ENRIQUE US COMP with [id number]   [facility] Maternal Fetal Medicine Ultrasound - [facility])    [address] [place] [address]   [phone]           Wear comfortable clothes and leave your valuables at home.            Mar 12, 2018 10:00 AM CDT   Radiology MD with UNRULY QUISPE MD   [facility])    [address] [place] [address]   [phone]           Please arrive at the time given for your first appointment. This visit is used internally to schedule the physician's time during your ultrasound.              Who to contact     If you have questions or need follow up  information about today's clinic visit or your schedule please contact [facility] [place] directly at [phone].  Normal or non-critical lab and imaging results will be communicated to you by MyChart, letter or phone within 4 business days after the clinic has received the results. If you do not hear from us within 7 days, please contact the clinic through Zoondyhart or phone. If you have a critical or abnormal lab result, we will notify you by phone as soon as possible.  Submit refill requests through OrangeScape or call your pharmacy and they will forward the refill request to us. Please allow 3 business days for your refill to be completed.          Additional Information About Your Visit        Zoondyhart Information     OrangeScape gives you secure access to your electronic health record. If you see a primary care provider, you can also send messages to your care team and make appointments. If you have questions, please call your primary care clinic.  If you do not have a primary care provider, please call [phone] and they will assist you.        Care EveryWhere ID     This is your Care EveryWhere ID. This could be used by other organizations to access your [place] medical records  [id number]        Your Vitals Were     Last Period BMI (Body Mass Index)                11/01/2017 30.07 kg/m2           Blood Pressure from Last 3 Encounters:   01/23/18 122/68   01/10/18 110/64   10/17/17 104/74    Weight from Last 3 Encounters:   01/23/18 192 lb (87.1 kg)   01/10/18 193 lb 9.6 oz (87.8 kg)   10/17/17 183 lb (83 kg)              Today, you had the following     No orders found for display       Primary Care Provider Office Phone # Fax #    Melvina Pennington [phone]       [facility] [address] [address]        Equal Access to Services     AYDEE MAJOR AH: Yuni Linares, eileen bear, qaybta shona gifford  lakelechi gordon. So [facility] [phone].    ATENCIÓN: Si habla destiny, tiene a burkett disposición servicios gratuitos de asistencia lingüística. Naveen al [phone].    We comply with applicable federal civil rights laws and [place] laws. We do not discriminate on the basis of race, color, national origin, age, disability, sex, sexual orientation, or gender identity.            Thank you!     Thank you for choosing [facility] [place]  for your care. Our goal is always to provide you with excellent care. Hearing back from our patients is one way we can continue to improve our services. Please take a few minutes to complete the written survey that you may receive in the mail after your visit with us. Thank you!             Your Updated Medication List - Protect others around you: Learn how to safely use, store and throw away your medicines at www.disposemymeds.org.          This list is accurate as of: 1/23/18 10:49 AM.  Always use your most recent med list.                   Brand Name Dispense Instructions for use Diagnosis    albuterol 90 MCG/ACT inhaler     1 Inhaler    Inhale 2 puffs into the lungs every 4 hours as needed for shortness of breath / dyspnea (or 20 minutes prior to excercise).    Intermittent asthma       ALLEGRA ALLERGY PO           ASPIRIN PO      Take 81 mg by mouth daily    Supervision of high risk pregnancy in first trimester       prenatal multivitamin plus iron 27-0.8 MG Tabs per tablet      Take 1 tablet by mouth daily    Supervision of high risk pregnancy in first trimester       triamcinolone 0.025 % cream    KENALOG     Apply topically 2 times daily        VALTREX 1000 mg tablet   Generic drug:  valACYclovir     8    2 tabs po bid x 1 day    Herpes simplex without mention of complication

## 2018-01-25 LAB — NON INVASIVE PRENATAL TEST CELL FREE DNA: NORMAL

## 2018-02-13 ENCOUNTER — PRENATAL OFFICE VISIT (OUTPATIENT)
Dept: OBGYN | Facility: CLINIC | Age: 38
End: 2018-02-13
Payer: COMMERCIAL

## 2018-02-13 VITALS — BODY MASS INDEX: 30.38 KG/M2 | SYSTOLIC BLOOD PRESSURE: 132 MMHG | DIASTOLIC BLOOD PRESSURE: 78 MMHG | WEIGHT: 194 LBS

## 2018-02-13 DIAGNOSIS — O26.21 HABITUAL ABORTION HISTORY, ANTEPARTUM, FIRST TRIMESTER: Primary | ICD-10-CM

## 2018-02-13 DIAGNOSIS — O09.511 ENCOUNTER FOR SUPERVISION OF PRIMIGRAVIDA OF ADVANCED MATERNAL AGE IN FIRST TRIMESTER: ICD-10-CM

## 2018-02-13 PROCEDURE — 99207 ZZC PRENATAL VISIT: CPT | Performed by: OBSTETRICS & GYNECOLOGY

## 2018-02-13 NOTE — PROGRESS NOTES
Return for afp after 15w  Level II us at 18 wks  Is still taking baby ASA daily  See us at 20 wks for ob check

## 2018-02-13 NOTE — MR AVS SNAPSHOT
After Visit Summary   2/13/2018    Yulisa Rai    MRN: [id number]           Patient Information     Date Of Birth          1980        Visit Information        Provider Department      2/13/2018 8:00 AM Lorraine Patino MD [facility] [place]        Today's Diagnoses     Encounter for supervision of primigravida of advanced maternal age in second trimester           Follow-ups after your visit        Your next 10 appointments already scheduled     Feb 14, 2018  8:20 AM CST   LAB with WE LAB   [facility] ([facility])    [address] [place] [address] [address]  [facility] [address]   [phone]           Please do not eat 10-12 hours before your appointment if you are coming in fasting for labs on lipids, cholesterol, or glucose (sugar). This does not apply to pregnant women. Water, hot tea and black coffee (with nothing added) are okay. Do not drink other fluids, diet soda or chew gum.            Mar 12, 2018  8:45 AM CDT   Genetic Counseling with UNRULY GEN COUNSELOR 1   [facility] Maternal Fetal [facility])    [address] [place] [address]   [phone]            Mar 12, 2018  9:30 AM CDT   (Arrive by 9:15 AM)   JOSE ENRIQUE US COMP with [id number]   [facility] Maternal Fetal Medicine Ultrasound - [facility])    [address] [place] [address]   [phone]           Wear comfortable clothes and leave your valuables at home.            Mar 12, 2018 10:00 AM CDT   Radiology MD with UNRULY QUISPE MD   [facility])    [address] [place] [address]   [phone]           Please arrive at the time given for your first appointment. This visit is used internally to schedule the physician's time during your ultrasound.            Mar 14, 2018  8:10 AM CDT    ESTABLISHED PRENATAL with Lorraine Patino MD   [facility] [place] ([facility] [place])    [address] [place] [address]  [place] [address] [phone]              Future tests that were ordered for you today     Open Future Orders        Priority Expected Expires Ordered    Alpha fetoprotein maternal screen Routine 3/13/2018 2/13/2019 2/13/2018            Who to contact     If you have questions or need follow up information about today's clinic visit or your schedule please contact [facility] directly at [phone].  Normal or non-critical lab and imaging results will be communicated to you by Measurablhart, letter or phone within 4 business days after the clinic has received the results. If you do not hear from us within 7 days, please contact the clinic through Bjondt or phone. If you have a critical or abnormal lab result, we will notify you by phone as soon as possible.  Submit refill requests through Extreme Reach (formerly BrandAds) or call your pharmacy and they will forward the refill request to us. Please allow 3 business days for your refill to be completed.          Additional Information About Your Visit        MyChart Information     Extreme Reach (formerly BrandAds) gives you secure access to your electronic health record. If you see a primary care provider, you can also send messages to your care team and make appointments. If you have questions, please call your primary care clinic.  If you do not have a primary care provider, please call [phone] and they will assist you.        Care EveryWhere ID     This is your Care EveryWhere ID. This could be used by other organizations to access your [place] medical records  [id number]        Your Vitals Were     Last Period Breastfeeding? BMI (Body Mass Index)             11/01/2017 No 30.38 kg/m2          Blood Pressure from Last 3 Encounters:   02/13/18 132/78   01/23/18 122/68   01/10/18 110/64    Weight from Last 3 Encounters:   02/13/18  194 lb (88 kg)   01/23/18 192 lb (87.1 kg)   01/10/18 193 lb 9.6 oz (87.8 kg)               Primary Care Provider Office Phone # Fax #    Melvina Pennington [phone]       [facility] [place] [address] [address] [address]        Equal Access to Services     [facility] : Hadii aad ku hadasho Soomaali, waaxda luqadaha, qaybta kaalmada adeegyada, waxay idiin hayaan adeeg kharash la'aan ah. So [facility] [phone].    ATENCIÓN: Si habla español, tiene a burkett disposición servicios gratuitos de asistencia lingüística. Naveen al [phone].    We comply with applicable federal civil rights laws and [place] laws. We do not discriminate on the basis of race, color, national origin, age, disability, sex, sexual orientation, or gender identity.            Thank you!     Thank you for choosing [facility] [place]  for your care. Our goal is always to provide you with excellent care. Hearing back from our patients is one way we can continue to improve our services. Please take a few minutes to complete the written survey that you may receive in the mail after your visit with us. Thank you!             Your Updated Medication List - Protect others around you: Learn how to safely use, store and throw away your medicines at www.disposemymeds.org.          This list is accurate as of 2/13/18  8:09 AM.  Always use your most recent med list.                   Brand Name Dispense Instructions for use Diagnosis    albuterol 90 MCG/ACT inhaler     1 Inhaler    Inhale 2 puffs into the lungs every 4 hours as needed for shortness of breath / dyspnea (or 20 minutes prior to excercise).    Intermittent asthma       ALLEGRA ALLERGY PO           ASPIRIN PO      Take 81 mg by mouth daily    Supervision of high risk pregnancy in first trimester       prenatal multivitamin plus iron 27-0.8 MG Tabs per tablet      Take 1 tablet by mouth daily    Supervision of high risk pregnancy in first trimester        triamcinolone 0.025 % cream    KENALOG     Apply topically 2 times daily        VALTREX 1000 mg tablet   Generic drug:  valACYclovir     8    2 tabs po bid x 1 day    Herpes simplex without mention of complication

## 2018-02-14 DIAGNOSIS — O09.511 ENCOUNTER FOR SUPERVISION OF PRIMIGRAVIDA OF ADVANCED MATERNAL AGE IN FIRST TRIMESTER: ICD-10-CM

## 2018-02-14 PROCEDURE — 36415 COLL VENOUS BLD VENIPUNCTURE: CPT | Performed by: OBSTETRICS & GYNECOLOGY

## 2018-02-14 PROCEDURE — 99000 SPECIMEN HANDLING OFFICE-LAB: CPT | Performed by: OBSTETRICS & GYNECOLOGY

## 2018-02-14 PROCEDURE — 82105 ALPHA-FETOPROTEIN SERUM: CPT | Mod: 90 | Performed by: OBSTETRICS & GYNECOLOGY

## 2018-02-15 LAB
# FETUSES US: NORMAL
AFP ADJ MOM AMN: 0.57
AFP SERPL-MCNC: 13 NG/ML
AGE - REPORTED: 36.2 YR
DATING METHOD: NORMAL
DIABETIC AT CONCEPTION: NO
FAMILY MEMBER DISEASES HX: NO
FAMILY MEMBER DISEASES HX: NO
GA METHOD: NORMAL
GA: 15 WEEKS
HX OF HEREDITARY DISORDERS: NO
IDDM PATIENT QL: NO
INTEGRATED SCN PATIENT-IMP: NORMAL
LMP START DATE: NORMAL
PREV HX CHROMOSOME ABNORMALITY: NO
SPECIMEN DRAWN SERPL: NORMAL
TWINS: NO

## 2018-02-23 ENCOUNTER — MYC MEDICAL ADVICE (OUTPATIENT)
Dept: OBGYN | Facility: CLINIC | Age: 38
End: 2018-02-23

## 2018-02-26 ENCOUNTER — TELEPHONE (OUTPATIENT)
Dept: NURSING | Facility: CLINIC | Age: 38
End: 2018-02-26

## 2018-02-26 NOTE — TELEPHONE ENCOUNTER
PT called back in response to her Mychart msg. Requesting to speak with clinic supervisor. Msg sent to Clinic Supervisor- Daxa CLARK She will contact her at [phone]- Pt aware that the call will take place after 330pm.

## 2018-02-26 NOTE — TELEPHONE ENCOUNTER
Reviewed with Daxa Rodríguez who will call pt. Left message on pt's voice with apologie and that has been reviewed with clinic supervisior. Please see my chart message below. Routing to Dr. Patino. JACK

## 2018-02-28 ENCOUNTER — TELEPHONE (OUTPATIENT)
Dept: NURSING | Facility: CLINIC | Age: 38
End: 2018-02-28

## 2018-02-28 ENCOUNTER — OFFICE VISIT (OUTPATIENT)
Dept: URGENT CARE | Facility: URGENT CARE | Age: 38
End: 2018-02-28
Payer: COMMERCIAL

## 2018-02-28 VITALS
OXYGEN SATURATION: 98 % | TEMPERATURE: 98.1 F | SYSTOLIC BLOOD PRESSURE: 146 MMHG | HEART RATE: 81 BPM | DIASTOLIC BLOOD PRESSURE: 86 MMHG

## 2018-02-28 DIAGNOSIS — Z34.92 PREGNANT AND NOT YET DELIVERED IN SECOND TRIMESTER: ICD-10-CM

## 2018-02-28 DIAGNOSIS — N94.9 ROUND LIGAMENT PAIN: Primary | ICD-10-CM

## 2018-02-28 LAB
ALBUMIN UR-MCNC: NEGATIVE MG/DL
APPEARANCE UR: CLEAR
BACTERIA #/AREA URNS HPF: ABNORMAL /HPF
BILIRUB UR QL STRIP: NEGATIVE
COLOR UR AUTO: YELLOW
GLUCOSE UR STRIP-MCNC: NEGATIVE MG/DL
HGB UR QL STRIP: NEGATIVE
KETONES UR STRIP-MCNC: NEGATIVE MG/DL
LEUKOCYTE ESTERASE UR QL STRIP: NEGATIVE
NITRATE UR QL: NEGATIVE
PH UR STRIP: 6.5 PH (ref 5–7)
RBC #/AREA URNS AUTO: ABNORMAL /HPF
SOURCE: ABNORMAL
SP GR UR STRIP: 1.01 (ref 1–1.03)
UROBILINOGEN UR STRIP-ACNC: 0.2 EU/DL (ref 0.2–1)
WBC #/AREA URNS AUTO: ABNORMAL /HPF

## 2018-02-28 PROCEDURE — 81001 URINALYSIS AUTO W/SCOPE: CPT | Performed by: PHYSICIAN ASSISTANT

## 2018-02-28 PROCEDURE — 99213 OFFICE O/P EST LOW 20 MIN: CPT | Performed by: PHYSICIAN ASSISTANT

## 2018-02-28 PROCEDURE — 87086 URINE CULTURE/COLONY COUNT: CPT | Performed by: PHYSICIAN ASSISTANT

## 2018-02-28 ASSESSMENT — ENCOUNTER SYMPTOMS
MYALGIAS: 0
CHILLS: 0
DIZZINESS: 0
NAUSEA: 1
ABDOMINAL PAIN: 1
FEVER: 0
COUGH: 0
DIARRHEA: 0
VOMITING: 0
DYSURIA: 0
HEADACHES: 0

## 2018-02-28 NOTE — TELEPHONE ENCOUNTER
Pt states that pains are a little better. When pt walks around still gets sharp pain on lower right side under belly that radiates to back. Pt feels nauseaed. Reviewed by Dr. Patino. Pt is either having gas pains or broad ligament pain. Pt needs to start Colace stool softner. Pt should increase hydration, can take tylenol, can take warm bath or try warm heating pad. Pt should call if develops fever greater than 100.4. Pt informed.

## 2018-02-28 NOTE — TELEPHONE ENCOUNTER
17w0d, YAMIL 8/8/18. Pt had sharp abd pain about 45 mins ago. Pain somewhat relieved by BM. Now just dull pain. Pt states feels like gas pains. Pt also complained of nausea, no emesisPt informed to try drinking a carbonated beverage. States is already burping. Pt can also move around and see if she can get the gas to move. Pt will call back in a hour or so if not better. Pt denies contractions, vag bleeding or LOF.

## 2018-02-28 NOTE — MR AVS SNAPSHOT
After Visit Summary   2/28/2018    Yulisa Rai    MRN: [id number]           Patient Information     Date Of Birth          1980        Visit Information        Provider Department      2/28/2018 7:40 PM Lorie Jaimes PA-C [facility]        Today's Diagnoses     Round ligament pain    -  1    Pregnant and not yet delivered in second trimester           Follow-ups after your visit        Your next 10 appointments already scheduled     Mar 12, 2018  8:45 AM CDT   Genetic Counseling with  GEN COUNSELOR 1   [facility] Maternal Fetal Medicine [facility] ([facility])    [address] [place] [address] [address]  [facility] [address]   [phone]            Mar 12, 2018  9:30 AM CDT   (Arrive by 9:15 AM)   JOSE ENRIQUE US COMP with MFMUSR1   [facility] Maternal Fetal Medicine Ultrasound - [facility] ([facility])    [address] [place] [address]  [facility] [address] [phone]           Wear comfortable clothes and leave your valuables at home.            Mar 12, 2018 10:00 AM CDT   Radiology MD with  JOSE ENRIQUE GR   [facility] Maternal Fetal Medicine [facility] ([facility])    [address] [place] [address]  [facility] [address] [phone]           Please arrive at the time given for your first appointment. This visit is used internally to schedule the physician's time during your ultrasound.            Mar 14, 2018  8:10 AM CDT   ESTABLISHED PRENATAL with Lorraine Patino MD   [facility])    [address] [place] [address] [address]  [facility] [id number] [phone]              Who to contact     If you have questions or need follow up information about today's clinic visit or your schedule please contact [facility] directly at [phone].  Normal or non-critical lab and imaging results will be communicated to you by MyChart, letter or  phone within 4 business days after the clinic has received the results. If you do not hear from us within 7 days, please contact the clinic through CadenceMD or phone. If you have a critical or abnormal lab result, we will notify you by phone as soon as possible.  Submit refill requests through CadenceMD or call your pharmacy and they will forward the refill request to us. Please allow 3 business days for your refill to be completed.          Additional Information About Your Visit        SynovexharHexago Information     CadenceMD gives you secure access to your electronic health record. If you see a primary care provider, you can also send messages to your care team and make appointments. If you have questions, please call your primary care clinic.  If you do not have a primary care provider, please call [phone] and they will assist you.        Care EveryWhere ID     This is your Care EveryWhere ID. This could be used by other organizations to access your [place] medical records  [id number]        Your Vitals Were     Pulse Temperature Last Period Pulse Oximetry          81 98.1  F (36.7  C) (Tympanic) 11/01/2017 98%         Blood Pressure from Last 3 Encounters:   02/28/18 146/86   02/13/18 132/78   01/23/18 122/68    Weight from Last 3 Encounters:   02/13/18 194 lb (88 kg)   01/23/18 192 lb (87.1 kg)   01/10/18 193 lb 9.6 oz (87.8 kg)              We Performed the Following     UA with Microscopic reflex to Culture     Urine Culture Aerobic Bacterial        Primary Care Provider Office Phone # Fax #    Melvina Pennington [phone]       [facility] [place] [address] [place] [address]        Equal Access to Services     [facility]: Hadii lucy stoddard hadashcoby Sobharat, waaxda luqadaha, qaybta kaalmada phyllis, shona gordon. So [facility] [phone].    ATENCIÓN: Si habla español, tiene a burkett disposición servicios gratuitos de asistencia lingüística. Llame al  [phone].    We comply with applicable federal civil rights laws and [place] laws. We do not discriminate on the basis of race, color, national origin, age, disability, sex, sexual orientation, or gender identity.            Thank you!     Thank you for choosing [facility]ARE  for your care. Our goal is always to provide you with excellent care. Hearing back from our patients is one way we can continue to improve our services. Please take a few minutes to complete the written survey that you may receive in the mail after your visit with us. Thank you!             Your Updated Medication List - Protect others around you: Learn how to safely use, store and throw away your medicines at www.disposemymeds.org.          This list is accurate as of 2/28/18  8:31 PM.  Always use your most recent med list.                   Brand Name Dispense Instructions for use Diagnosis    albuterol 90 MCG/ACT inhaler     1 Inhaler    Inhale 2 puffs into the lungs every 4 hours as needed for shortness of breath / dyspnea (or 20 minutes prior to excercise).    Intermittent asthma       ALLEGRA ALLERGY PO           ASPIRIN PO      Take 81 mg by mouth daily    Supervision of high risk pregnancy in first trimester       prenatal multivitamin plus iron 27-0.8 MG Tabs per tablet      Take 1 tablet by mouth daily    Supervision of high risk pregnancy in first trimester       triamcinolone 0.025 % cream    KENALOG     Apply topically 2 times daily        VALTREX 1000 mg tablet   Generic drug:  valACYclovir     8    2 tabs po bid x 1 day    Herpes simplex without mention of complication

## 2018-03-01 LAB
BACTERIA SPEC CULT: NORMAL
SPECIMEN SOURCE: NORMAL

## 2018-03-01 NOTE — PROGRESS NOTES
SUBJECTIVE:   Yulisa Rai is a 37 year old female presenting with a chief complaint of   Chief Complaint   Patient presents with     Urgent Care     Pain     Patient is 17 weeks pregnant and experiencing some cramping along with sharp pain on and off in abdomen and back. Patient is also concern that this may be a UTI. Pain rating at 3-4/10 for cramping and 5/10 for sharp pain.   .    Location: generalized, on right side, with some lower abdomen cramping   Radiation: Back.    Pain character: dull,   Severity: 3 on a scale of 1-10.    Duration: 1 day(s)   Course of Illness: stable -- feels like gas pains  Exacerbated by: nothing  Relieved by: passing gas.  Female : 17w pregnant -- concerned because she has had 3 prior miscarriages      Review of Systems   Constitutional: Negative for chills, fever and malaise/fatigue.   Respiratory: Negative for cough.    Cardiovascular: Negative for chest pain.   Gastrointestinal: Positive for abdominal pain and nausea. Negative for diarrhea and vomiting.   Genitourinary: Negative for dysuria.   Musculoskeletal: Negative for myalgias.   Neurological: Negative for dizziness and headaches.         Past Medical History:   Diagnosis Date     Bunions     shireen     Cyst of Bartholin's gland     s/p word catheter placement     BRIJESH (generalised anxiety disorder)     celexa     Herpes simplex without mention of complication     cold sores     Hx musculoskletl dis NEC     rt thoracic back pain/tingling     Intermittent asthma     excercise induced-mild     Papanicolaou smear of cervix with atypical squamous cells of undetermined significance (ASC-US) 2003    s/p colposcopy nl , pos hpv 58 high risk     Papanicolaou smear of cervix with low grade squamous intraepithelial lesion (LGSIL) 3/07    mild dysplasia, hpv, mane 1 s/p neg colposcopy with ecc and cervical bx 3/07 repeat pap q 6 mos x 2     Current Outpatient Prescriptions   Medication Sig Dispense Refill     Prenatal Vit-Fe  Fumarate-FA (PRENATAL MULTIVITAMIN PLUS IRON) 27-0.8 MG TABS per tablet Take 1 tablet by mouth daily       ASPIRIN PO Take 81 mg by mouth daily       Fexofenadine HCl (ALLEGRA ALLERGY PO)        albuterol 90 MCG/ACT inhaler Inhale 2 puffs into the lungs every 4 hours as needed for shortness of breath / dyspnea (or 20 minutes prior to excercise). 1 Inhaler prn     VALTREX 1 GM OR TABS 2 tabs po bid x 1 day 8 2     triamcinolone (KENALOG) 0.025 % cream Apply topically 2 times daily       Social History   Substance Use Topics     Smoking status: Never Smoker     Smokeless tobacco: Never Used     Alcohol use No      Comment: none since pg       OBJECTIVE  /86 (BP Location: Right arm, Patient Position: Sitting, Cuff Size: Adult Regular)  Pulse 81  Temp 98.1  F (36.7  C) (Tympanic)  LMP 11/01/2017  SpO2 98%    Physical Exam   Constitutional: She is well-developed, well-nourished, and in no distress. No distress.   Abdominal: Soft. Normal appearance. There is no tenderness. There is no rebound and no CVA tenderness.   Skin: She is not diaphoretic.       Labs:  Results for orders placed or performed in visit on 02/28/18 (from the past 24 hour(s))   UA with Microscopic reflex to Culture   Result Value Ref Range    Color Urine Yellow     Appearance Urine Clear     Glucose Urine Negative NEG^Negative mg/dL    Bilirubin Urine Negative NEG^Negative    Ketones Urine Negative NEG^Negative mg/dL    Specific Gravity Urine 1.010 1.003 - 1.035    pH Urine 6.5 5.0 - 7.0 pH    Protein Albumin Urine Negative NEG^Negative mg/dL    Urobilinogen Urine 0.2 0.2 - 1.0 EU/dL    Nitrite Urine Negative NEG^Negative    Blood Urine Negative NEG^Negative    Leukocyte Esterase Urine Negative NEG^Negative    Source Midstream Urine     WBC Urine O - 2 OTO2^O - 2 /HPF    RBC Urine O - 2 OTO2^O - 2 /HPF    Bacteria Urine Few (A) NEG^Negative /HPF         ASSESSMENT/PLAN:      ICD-10-CM    1. Round ligament pain N94.9 UA with Microscopic reflex  to Culture     Urine Culture Aerobic Bacterial   2. Pregnant and not yet delivered in second trimester Z34.92       Consistent with round ligament pain. No bleeding is reassuring. Follow up with OB tomorrow if pain persists  Go to ED if bleeding occurs    Lorie Jaimes PA-C

## 2018-03-05 ENCOUNTER — PRE VISIT (OUTPATIENT)
Dept: MATERNAL FETAL MEDICINE | Facility: CLINIC | Age: 38
End: 2018-03-05

## 2018-03-12 ENCOUNTER — OFFICE VISIT (OUTPATIENT)
Dept: MATERNAL FETAL MEDICINE | Facility: CLINIC | Age: 38
End: 2018-03-12
Attending: OBSTETRICS & GYNECOLOGY
Payer: COMMERCIAL

## 2018-03-12 ENCOUNTER — HOSPITAL ENCOUNTER (OUTPATIENT)
Dept: ULTRASOUND IMAGING | Facility: CLINIC | Age: 38
Discharge: HOME | End: 2018-03-12
Attending: OBSTETRICS & GYNECOLOGY | Admitting: OBSTETRICS & GYNECOLOGY
Payer: COMMERCIAL

## 2018-03-12 DIAGNOSIS — O09.522 SUPERVISION OF ELDERLY MULTIGRAVIDA IN SECOND TRIMESTER: ICD-10-CM

## 2018-03-12 DIAGNOSIS — O09.522 ELDERLY MULTIGRAVIDA IN SECOND TRIMESTER: Primary | ICD-10-CM

## 2018-03-12 DIAGNOSIS — O26.90 PREGNANCY RELATED CONDITION, ANTEPARTUM: ICD-10-CM

## 2018-03-12 DIAGNOSIS — O26.90 PREGNANCY RELATED CONDITION, ANTEPARTUM: Primary | ICD-10-CM

## 2018-03-12 DIAGNOSIS — O34.10 UTERINE FIBROID IN PREGNANCY: ICD-10-CM

## 2018-03-12 DIAGNOSIS — D25.9 UTERINE FIBROID IN PREGNANCY: ICD-10-CM

## 2018-03-12 PROCEDURE — 96040 ZZH GENETIC COUNSELING, EACH 30 MINUTES: CPT | Mod: ZF | Performed by: GENETIC COUNSELOR, MS

## 2018-03-12 PROCEDURE — 76811 OB US DETAILED SNGL FETUS: CPT

## 2018-03-12 NOTE — PROGRESS NOTES
Please refer to ultrasound report under 'Imaging' Studies of 'Chart Review' tabs.    Thai Delaney M.D.

## 2018-03-12 NOTE — MR AVS SNAPSHOT
After Visit Summary   3/12/2018    Yulisa Rai    MRN: [id number]           Patient Information     Date Of Birth          1980        Visit Information        Provider Department      3/12/2018 8:45 AM Beverley Doshi GC [facility] Maternal Fetal Medicine [facility]        Today's Diagnoses     Pregnancy related condition, antepartum    -  1    Supervision of elderly multigravida in second trimester           Follow-ups after your visit        Your next 10 appointments already scheduled     Mar 14, 2018  8:10 AM CDT   ESTABLISHED PRENATAL with Lorraine Patino MD   [facility] [place] ([facility])    [address]  [facility] [address]   [phone]            Apr 09, 2018  8:00 AM CDT   (Arrive by 7:45 AM)   MFM US COMPRE SINGLE F/U with [id number]   [facility] Maternal Fetal Medicine Ultrasound - [facility] ([facility])    [address] [place] [address]  [facility] [id number] [phone]           Wear comfortable clothes and leave your valuables at home.            Apr 09, 2018  8:30 AM CDT   Radiology MD with  JOSE ENRIQUE GR   [facility] Maternal Fetal Medicine [facility] ([facility])    [address] [place] [address]  [facility] [id number] [phone]           Please arrive at the time given for your first appointment. This visit is used internally to schedule the physician's time during your ultrasound.              Future tests that were ordered for you today     Open Future Orders        Priority Expected Expires Ordered    MFM US Comprehensive Single F/U Routine 4/9/2018 3/12/2019 3/12/2018            Who to contact     If you have questions or need follow up information about today's clinic visit or your schedule please contact [facility] MATERNAL FETAL MEDICINE [facility] directly at [phone].  Normal or non-critical lab and imaging results will be communicated to you by Marci  letter or phone within 4 business days after the clinic has received the results. If you do not hear from us within 7 days, please contact the clinic through [employer] or phone. If you have a critical or abnormal lab result, we will notify you by phone as soon as possible.  Submit refill requests through [employer] or call your pharmacy and they will forward the refill request to us. Please allow 3 business days for your refill to be completed.          Additional Information About Your Visit        [employer] Information     [employer] gives you secure access to your electronic health record. If you see a primary care provider, you can also send messages to your care team and make appointments. If you have questions, please call your primary care clinic.  If you do not have a primary care provider, please call [phone] and they will assist you.        Care EveryWhere ID     This is your Care EveryWhere ID. This could be used by other organizations to access your [facility] medical records  [id number]        Your Vitals Were     Last Period                   11/01/2017            Blood Pressure from Last 3 Encounters:   02/28/18 146/86   02/13/18 132/78   01/23/18 122/68    Weight from Last 3 Encounters:   02/13/18 88 kg (194 lb)   01/23/18 87.1 kg (192 lb)   01/10/18 87.8 kg (193 lb 9.6 oz)              We Performed the Following     [facility] Genetic Counseling        Primary Care Provider Office Phone # Fax #    Melvina Pennington [phone]       [facility] [address] [place] [address]        Equal Access to Services     [facility] AH: Hadii aad ku hadasho Soomaali, waaxda luqadaha, qaybta kaalmada adeegyada, shona gordon. So [facility] [place] [phone].    ATENCIÓN: Si habla español, tiene a burkett disposición servicios gratuitos de asistencia lingüística. Llame al [phone].    We comply with applicable federal civil rights laws and [place] laws. We do not  discriminate on the basis of race, color, national origin, age, disability, sex, sexual orientation, or gender identity.            Thank you!     Thank you for choosing [facility]  for your care. Our goal is always to provide you with excellent care. Hearing back from our patients is one way we can continue to improve our services. Please take a few minutes to complete the written survey that you may receive in the mail after your visit with us. Thank you!             Your Updated Medication List - Protect others around you: Learn how to safely use, store and throw away your medicines at www.disposemymeds.org.          This list is accurate as of 3/12/18 11:19 AM.  Always use your most recent med list.                   Brand Name Dispense Instructions for use Diagnosis    albuterol 90 MCG/ACT inhaler     1 Inhaler    Inhale 2 puffs into the lungs every 4 hours as needed for shortness of breath / dyspnea (or 20 minutes prior to excercise).    Intermittent asthma       ALLEGRA ALLERGY PO           ASPIRIN PO      Take 81 mg by mouth daily    Supervision of high risk pregnancy in first trimester       prenatal multivitamin plus iron 27-0.8 MG Tabs per tablet      Take 1 tablet by mouth daily    Supervision of high risk pregnancy in first trimester       triamcinolone 0.025 % cream    KENALOG     Apply topically 2 times daily        VALTREX 1000 mg tablet   Generic drug:  valACYclovir     8    2 tabs po bid x 1 day    Herpes simplex without mention of complication

## 2018-03-12 NOTE — PROGRESS NOTES
[facility]  Genetic Counseling Consult    Patient: Yulisa Rai YOB: 1980   Date of Service: 3/12/18      Yulisa Rai was seen at [facility] for genetic consultation as part of her comprehensive ultrasound appointment.        Impression/Plan:   1.  Yulisa had a level II ultrasound today due to advanced maternal age.       2.  Yulisa had non-invasive prenatal screening (NIPT) performed at 10 weeks gestation. These results were normal and were reviewed with her today. Yulisa also had maternal serum AFP screening performed with a normal result, 0.57 MoM.     Pregnancy History:   /Parity:    Age at Delivery: 38 year old  YAMIL: 2018, by Last Menstrual Period  Gestational Age: 18w5d    No significant complications or exposures were reported in the current pregnancy.    Yulisa krishnan pregnancy history is significant for 3 early pregnancy losses, all around 8 weeks gestation, with her most recent being in 2017. Testing was performed on her two most recent pregnancy losses, both with normal chromosome results.     Medical History:   Yulisa krishnan reported medical history is not expected to impact pregnancy management or risks to fetal development. She reports taking 80 mg daily of ASA and a daily prenatal vitamin. Yulisa had three uterine fibroids removed in 2017.       Family History:   A three-generation pedigree was obtained, and is scanned under the  Media  tab. The reported family history is negative for multiple miscarriages, stillbirths, birth defects, mental retardation, known genetic conditions, and consanguinity.       Carrier Screening:   The patient reports that she and the father of the pregnancy have  ancestry:      Cystic fibrosis is an autosomal recessive genetic condition that occurs with increased frequency in individuals of  ancestry and carrier  screening for this condition is available.  In addition,  screening in the [facility] includes cystic fibrosis.      Expanded carrier screening for mutations in a large panel of genes associated with autosomal recessive conditions including cystic fibrosis, spinal muscular atrophy, and others, is now available.       Risk Assessment for Chromosome Conditions:   We explained that the risk for fetal chromosome abnormalities increases with maternal age. We discussed specific features of common chromosome abnormalities, including Down syndrome, trisomy 13, trisomy 18, and sex chromosome trisomies.      - At age 38 at midtrimester, the risk to have a baby with Down syndrome is 1 in 216.     - At age 38 at midtrimester, the risk to have a baby with any chromosome abnormality is 1 in 105.     Testing Options:   We discussed the following:   Non-invasive Prenatal Testing (NIPT)    Maternal plasma cell-free DNA testing; first trimester ultrasound with nuchal translucency and nasal bone assessment is recommended, when appropriate    Screens for fetal trisomy 21, trisomy 13, trisomy 18, and sex chromosome aneuploidy    Cannot screen for open neural tube defects; maternal serum AFP after 15 weeks is recommended     Comprehensive (Level II) ultrasound: Detailed ultrasound performed between 18-22 weeks gestation to screen for major birth defects and markers for aneuploidy.      We reviewed the benefits and limitations of this testing.  Screening tests provide a risk assessment specific to the pregnancy for certain fetal chromosome abnormalities, but cannot definitively diagnose or exclude a fetal chromosome abnormality.  Follow-up genetic counseling and consideration of diagnostic testing is recommended with any abnormal screening result. Diagnostic tests carry inherent risks- including risk of miscarriage- that require careful consideration.  These tests can detect fetal chromosome abnormalities with greater than  99% certainty.  Results can be compromised by maternal cell contamination or mosaicism, and are limited by the resolution of cytogenetic G-banding technology.  There is no screening nor diagnostic test that can detect all forms of birth defects or mental disability.     It was a pleasure to be involved with Yulisa [facility]. Face-to-face time of the meeting was 30 minutes.    Lizzy Doshi MS, [facility]  Maternal Fetal Medicine  [facility]  Ph: [phone]  [email or web address]

## 2018-03-12 NOTE — MR AVS SNAPSHOT
MRN:[id number]                      After Visit Summary   3/12/2018    Yulisa Rai    MRN: [id number]           Visit Information        Provider Department      3/12/2018 10:00 AM Thai Delaney MD [facility] Maternal [facility]etal [facility]        Your next 10 appointments already scheduled     Mar 14, 2018  8:10 AM CDT   ESTABLISHED PRENATAL with Lorraine Patino MD   [facility] [place] ([facility] [place])    [address] [place] [address] [address]  [facility] [id number] [phone]              MyChart Information     [employer] gives you secure access to your electronic health record. If you see a primary care provider, you can also send messages to your care team and make appointments. If you have questions, please call your primary care clinic.  If you do not have a primary care provider, please call [phone] and they will assist you.        Care EveryWhere ID     This is your Care EveryWhere ID. This could be used by other organizations to access your [place] medical records  [id number]        Equal Access to Services     AYDEE MAJOR : Hadii lucy stoddard hadasho Soomaali, waaxda luqadaha, qaybta kaalmada adeegyadre, shona gordon. So [facility] [phone].    ATENCIÓN: Si habla español, tiene a burkett disposición servicios gratuitos de asistencia lingüística. Llame al [phone].    We comply with applicable federal civil rights laws and [place] laws. We do not discriminate on the basis of race, color, national origin, age, disability, sex, sexual orientation, or gender identity.

## 2018-03-14 ENCOUNTER — PRENATAL OFFICE VISIT (OUTPATIENT)
Dept: OBGYN | Facility: CLINIC | Age: 38
End: 2018-03-14
Payer: COMMERCIAL

## 2018-03-14 VITALS — WEIGHT: 202 LBS | SYSTOLIC BLOOD PRESSURE: 110 MMHG | BODY MASS INDEX: 31.64 KG/M2 | DIASTOLIC BLOOD PRESSURE: 62 MMHG

## 2018-03-14 DIAGNOSIS — O09.511 ENCOUNTER FOR SUPERVISION OF PRIMIGRAVIDA OF ADVANCED MATERNAL AGE IN FIRST TRIMESTER: Primary | ICD-10-CM

## 2018-03-14 PROCEDURE — 99207 ZZC PRENATAL VISIT: CPT | Performed by: OBSTETRICS & GYNECOLOGY

## 2018-03-14 NOTE — MR AVS SNAPSHOT
After Visit Summary   3/14/2018    Yulisa Rai    MRN: [id number]           Patient Information     Date Of Birth          1980        Visit Information        Provider Department      3/14/2018 8:10 AM Lorraine Patino MD [facility] [place]        Today's Diagnoses     Encounter for supervision of primigravida of advanced maternal age in second trimester    -  1       Follow-ups after your visit        Your next 10 appointments already scheduled     Apr 09, 2018  8:00 AM CDT   (Arrive by 7:45 AM)   MFM US COMPRE SINGLE F/U with [id number]   [facility] Maternal [facility]etal [facility] Ultrasound - [facility] ([facility])    [address]  [facility] [id number] [phone]           Wear comfortable clothes and leave your valuables at home.            Apr 09, 2018  8:30 AM CDT   Radiology MD with [facility]   [facility] Maternal Fetal Medicine [facility] ([facility])    [address] [place] [address] [address]  [facility] [id number] [phone]           Please arrive at the time given for your first appointment. This visit is used internally to schedule the physician's time during your ultrasound.            Apr 18, 2018  8:20 AM CDT   ESTABLISHED PRENATAL with Lorraine Patino MD   [facility] [place] ([facility] [place])    [address]  [place] [id number] [phone]              Who to contact     If you have questions or need follow up information about today's clinic visit or your schedule please contact [facility] [place] directly at [phone].  Normal or non-critical lab and imaging results will be communicated to you by MyChart, letter or phone within 4 business days after the clinic has received the results. If you do not hear from us within 7 days, please contact the clinic through MyChart or phone. If you have a critical or abnormal lab result, we will notify  you by phone as soon as possible.  Submit refill requests through [employer] or call your pharmacy and they will forward the refill request to us. Please allow 3 business days for your refill to be completed.          Additional Information About Your Visit        [employer] Information     [employer] gives you secure access to your electronic health record. If you see a primary care provider, you can also send messages to your care team and make appointments. If you have questions, please call your primary care clinic.  If you do not have a primary care provider, please call [phone] and they will assist you.        Care EveryWhere ID     This is your Care EveryWhere ID. This could be used by other organizations to access your [place] medical records  [id number]        Your Vitals Were     Last Period BMI (Body Mass Index)                11/01/2017 31.64 kg/m2           Blood Pressure from Last 3 Encounters:   03/14/18 110/62   02/28/18 146/86   02/13/18 132/78    Weight from Last 3 Encounters:   03/14/18 202 lb (91.6 kg)   02/13/18 194 lb (88 kg)   01/23/18 192 lb (87.1 kg)              Today, you had the following     No orders found for display       Primary Care Provider Office Phone # Fax #    Melvina Pennington [phone]       [facility] [address] [place] [address]        Equal Access to Services     ELISHA [facility] HospitalJEYSON : Hadii aad ku hadasho Soomaali, waaxda luqadaha, qaybta kaalmada adeegyada, shona arellano . So [facility] [phone].    ATENCIÓN: Si habla español, tiene a burkett disposición servicios gratuitos de asistencia lingüística. Llame al [phone].    We comply with applicable federal civil rights laws and [place] laws. We do not discriminate on the basis of race, color, national origin, age, disability, sex, sexual orientation, or gender identity.            Thank you!     Thank you for choosing [facility]  for your care.  Our goal is always to provide you with excellent care. Hearing back from our patients is one way we can continue to improve our services. Please take a few minutes to complete the written survey that you may receive in the mail after your visit with us. Thank you!             Your Updated Medication List - Protect others around you: Learn how to safely use, store and throw away your medicines at www.disposemymeds.org.          This list is accurate as of 3/14/18  8:37 AM.  Always use your most recent med list.                   Brand Name Dispense Instructions for use Diagnosis    albuterol 90 MCG/ACT inhaler     1 Inhaler    Inhale 2 puffs into the lungs every 4 hours as needed for shortness of breath / dyspnea (or 20 minutes prior to excercise).    Intermittent asthma       ASPIRIN PO      Take 81 mg by mouth daily    Supervision of high risk pregnancy in first trimester       prenatal multivitamin plus iron 27-0.8 MG Tabs per tablet      Take 1 tablet by mouth daily    Supervision of high risk pregnancy in first trimester       VALTREX 1000 mg tablet   Generic drug:  valACYclovir     8    2 tabs po bid x 1 day    Herpes simplex without mention of complication

## 2018-04-09 ENCOUNTER — HOSPITAL ENCOUNTER (OUTPATIENT)
Dept: ULTRASOUND IMAGING | Facility: CLINIC | Age: 38
Discharge: HOME | End: 2018-04-09
Attending: OBSTETRICS & GYNECOLOGY | Admitting: OBSTETRICS & GYNECOLOGY
Payer: COMMERCIAL

## 2018-04-09 ENCOUNTER — OFFICE VISIT (OUTPATIENT)
Dept: MATERNAL FETAL MEDICINE | Facility: CLINIC | Age: 38
End: 2018-04-09
Attending: OBSTETRICS & GYNECOLOGY
Payer: COMMERCIAL

## 2018-04-09 DIAGNOSIS — O09.522 ELDERLY MULTIGRAVIDA IN SECOND TRIMESTER: Primary | ICD-10-CM

## 2018-04-09 PROCEDURE — 76816 OB US FOLLOW-UP PER FETUS: CPT

## 2018-04-09 NOTE — PROGRESS NOTES
Please see full imaging report from [employer] program under imaging tab.        Sulema Bell MD  Maternal Fetal Medicine

## 2018-04-09 NOTE — MR AVS SNAPSHOT
After Visit Summary   4/9/2018    Yulisa Rai    MRN: [id number]           Patient Information     Date Of Birth          1980        Visit Information        Provider Department      4/9/2018 8:30 AM Sulema Bell MD [employer] Maternal Fetal Medicine [facility]        Today's Diagnoses     Elderly multigravida in second trimester    -  1       Follow-ups after your visit        Your next 10 appointments already scheduled     Apr 18, 2018  8:20 AM CDT   ESTABLISHED PRENATAL with Lorraine Patino MD   [facility] [place] ([facility] [place])    [address] [place] [id number] [phone]              Who to contact     If you have questions or need follow up information about today's clinic visit or your schedule please contact [facility] MATERNAL FETAL MEDICINE [facility] directly at [phone].  Normal or non-critical lab and imaging results will be communicated to you by [employer], letter or phone within 4 business days after the clinic has received the results. If you do not hear from us within 7 days, please contact the clinic through [employer] or phone. If you have a critical or abnormal lab result, we will notify you by phone as soon as possible.  Submit refill requests through [employer] or call your pharmacy and they will forward the refill request to us. Please allow 3 business days for your refill to be completed.          Additional Information About Your Visit        MyChart Information     [employer] gives you secure access to your electronic health record. If you see a primary care provider, you can also send messages to your care team and make appointments. If you have questions, please call your primary care clinic.  If you do not have a primary care provider, please call [phone] and they will assist you.        Care EveryWhere ID     This is your Care EveryWhere ID. This could be used by other organizations to access  your [place] medical records  [id number]        Your Vitals Were     Last Period                   11/01/2017            Blood Pressure from Last 3 Encounters:   03/14/18 110/62   02/28/18 146/86   02/13/18 132/78    Weight from Last 3 Encounters:   03/14/18 91.6 kg (202 lb)   02/13/18 88 kg (194 lb)   01/23/18 87.1 kg (192 lb)              Today, you had the following     No orders found for display       Primary Care Provider Office Phone # Fax #    Melvina Pennington [phone]       [facility] [address] [place] [address]        Equal Access to Services     ELISHA MAJOR : Hadii lucy martinezo Sobharat, waaxda jadielqadaha, qaybta kaalmada ademarleniyada, shona arellano . So [facility] [phone].    ATENCIÓN: Si habla español, tiene a burkett disposición servicios gratuitos de asistencia lingüística. Llame al [phone].    We comply with applicable federal civil rights laws and [place] laws. We do not discriminate on the basis of race, color, national origin, age, disability, sex, sexual orientation, or gender identity.            Thank you!     Thank you for choosing [facility]  for your care. Our goal is always to provide you with excellent care. Hearing back from our patients is one way we can continue to improve our services. Please take a few minutes to complete the written survey that you may receive in the mail after your visit with us. Thank you!             Your Updated Medication List - Protect others around you: Learn how to safely use, store and throw away your medicines at www.disposemymeds.org.          This list is accurate as of 4/9/18  9:36 AM.  Always use your most recent med list.                   Brand Name Dispense Instructions for use Diagnosis    albuterol 90 MCG/ACT inhaler     1 Inhaler    Inhale 2 puffs into the lungs every 4 hours as needed for shortness of breath / dyspnea (or 20 minutes prior to  excercise).    Intermittent asthma       ASPIRIN PO      Take 81 mg by mouth daily    Supervision of high risk pregnancy in first trimester       prenatal multivitamin plus iron 27-0.8 MG Tabs per tablet      Take 1 tablet by mouth daily    Supervision of high risk pregnancy in first trimester       VALTREX 1000 mg tablet   Generic drug:  valACYclovir     8    2 tabs po bid x 1 day    Herpes simplex without mention of complication

## 2018-04-18 ENCOUNTER — PRENATAL OFFICE VISIT (OUTPATIENT)
Dept: OBGYN | Facility: CLINIC | Age: 38
End: 2018-04-18
Payer: COMMERCIAL

## 2018-04-18 VITALS — BODY MASS INDEX: 32.58 KG/M2 | WEIGHT: 208 LBS | DIASTOLIC BLOOD PRESSURE: 68 MMHG | SYSTOLIC BLOOD PRESSURE: 120 MMHG

## 2018-04-18 DIAGNOSIS — O09.519 ADVANCED MATERNAL AGE, PRIMIGRAVIDA, ANTEPARTUM: ICD-10-CM

## 2018-04-18 DIAGNOSIS — O09.92 SUPERVISION OF HIGH RISK PREGNANCY IN SECOND TRIMESTER: Primary | ICD-10-CM

## 2018-04-18 PROCEDURE — 99207 ZZC PRENATAL VISIT: CPT | Performed by: OBSTETRICS & GYNECOLOGY

## 2018-04-18 NOTE — MR AVS SNAPSHOT
After Visit Summary   4/18/2018    Yulisa Rai    MRN: [id number]           Patient Information     Date Of Birth          1980        Visit Information        Provider Department      4/18/2018 8:20 AM Lorraine Patino MD [facility] [place]        Today's Diagnoses     Supervision of high risk pregnancy in second trimester    -  1    Advanced maternal age, primigravida, antepartum           Follow-ups after your visit        Your next 10 appointments already scheduled     May 15, 2018  8:50 AM CDT   Glucose Tolerance Test with [facility]   [facility] ([facility] [place])    [address]  [facility] [address]   [phone]            May 15, 2018  9:10 AM CDT   ESTABLISHED PRENATAL with Lorraine Patino MD   [facility] ([facility] [place])    [address]  [place] [address]   [phone]              Who to contact     If you have questions or need follow up information about today's clinic visit or your schedule please contact [facility] [place] directly at [phone].  Normal or non-critical lab and imaging results will be communicated to you by Synchronizedhart, letter or phone within 4 business days after the clinic has received the results. If you do not hear from us within 7 days, please contact the clinic through Synchronizedhart or phone. If you have a critical or abnormal lab result, we will notify you by phone as soon as possible.  Submit refill requests through ThinAir Wireless or call your pharmacy and they will forward the refill request to us. Please allow 3 business days for your refill to be completed.          Additional Information About Your Visit        MyChart Information     Thin[employer] gives you secure access to your electronic health record. If you see a primary care provider, you can also send messages to your care team and make appointments. If you  have questions, please call your primary care clinic.  If you do not have a primary care provider, please call [phone] and they will assist you.        Care EveryWhere ID     This is your Care EveryWhere ID. This could be used by other organizations to access your [place] medical records  [id number]        Your Vitals Were     Last Period BMI (Body Mass Index)                11/01/2017 32.58 kg/m2           Blood Pressure from Last 3 Encounters:   04/18/18 120/68   03/14/18 110/62   02/28/18 146/86    Weight from Last 3 Encounters:   04/18/18 208 lb (94.3 kg)   03/14/18 202 lb (91.6 kg)   02/13/18 194 lb (88 kg)              Today, you had the following     No orders found for display       Primary Care Provider Office Phone # Fax #    Melvina Pennington [phone]       [place] [address]        Equal Access to Services     [facility] : Hadii aad ku hadasho Soomaali, waaxda luqadaha, qaybta kaalmada adeegyada, waxay idiin hayaan kimberlyeg kharatrevor arellano . So [facility] [phone].    ATENCIÓN: Si habla español, tiene a burkett disposición servicios gratuitos de asistencia lingüística. Llame al [phone].    We comply with applicable federal civil rights laws and [place] laws. We do not discriminate on the basis of race, color, national origin, age, disability, sex, sexual orientation, or gender identity.            Thank you!     Thank you for choosing [facility]  for your care. Our goal is always to provide you with excellent care. Hearing back from our patients is one way we can continue to improve our services. Please take a few minutes to complete the written survey that you may receive in the mail after your visit with us. Thank you!             Your Updated Medication List - Protect others around you: Learn how to safely use, store and throw away your medicines at www.disposemymeds.org.          This list is accurate as of  4/18/18  8:42 AM.  Always use your most recent med list.                   Brand Name Dispense Instructions for use Diagnosis    albuterol 90 MCG/ACT inhaler     1 Inhaler    Inhale 2 puffs into the lungs every 4 hours as needed for shortness of breath / dyspnea (or 20 minutes prior to excercise).    Intermittent asthma       ASPIRIN PO      Take 81 mg by mouth daily    Supervision of high risk pregnancy in first trimester       prenatal multivitamin plus iron 27-0.8 MG Tabs per tablet      Take 1 tablet by mouth daily    Supervision of high risk pregnancy in first trimester       VALTREX 1000 mg tablet   Generic drug:  valACYclovir     8    2 tabs po bid x 1 day    Herpes simplex without mention of complication

## 2018-05-05 ENCOUNTER — HEALTH MAINTENANCE LETTER (OUTPATIENT)
Age: 38
End: 2018-05-05

## 2018-05-07 ENCOUNTER — TELEPHONE (OUTPATIENT)
Dept: OBGYN | Facility: CLINIC | Age: 38
End: 2018-05-07

## 2018-05-07 NOTE — TELEPHONE ENCOUNTER
Pt returned call. Pt states according to her apple watch her heart rate has occ gone to . Pt had not felt heart racing. Pt states happened twice, usually in a stuffy room. Heart rate slowed when she got up and moved. Pt denies SOB, chest pain, difficulty breathing, or heart palpations. Pt informed that blood volume increases with pregnancy. Normal to feel rapid heart rate on occ. Pt informed to call if she has SOB, chest pain, difficulty breathing, or heart palpations she should call back or go directly to ED for evaluation. Pt also asked about one hour GTT. Informed she needs to not eat or drink for one hour prior to testing. Pt can drink water.

## 2018-05-15 ENCOUNTER — PRENATAL OFFICE VISIT (OUTPATIENT)
Dept: OBGYN | Facility: CLINIC | Age: 38
End: 2018-05-15
Payer: COMMERCIAL

## 2018-05-15 VITALS — SYSTOLIC BLOOD PRESSURE: 122 MMHG | WEIGHT: 214.8 LBS | BODY MASS INDEX: 33.64 KG/M2 | DIASTOLIC BLOOD PRESSURE: 78 MMHG

## 2018-05-15 DIAGNOSIS — O09.513 ENCOUNTER FOR SUPERVISION OF PRIMIGRAVIDA OF ADVANCED MATERNAL AGE IN THIRD TRIMESTER: ICD-10-CM

## 2018-05-15 DIAGNOSIS — Z23 NEED FOR TDAP VACCINATION: ICD-10-CM

## 2018-05-15 DIAGNOSIS — Z36.9 ENCOUNTER FOR ANTENATAL SCREENING OF MOTHER: Primary | ICD-10-CM

## 2018-05-15 LAB
GLUCOSE 1H P 50 G GLC PO SERPL-MCNC: 104 MG/DL (ref 60–129)
HGB BLD-MCNC: 12.8 G/DL (ref 11.7–15.7)

## 2018-05-15 PROCEDURE — 86780 TREPONEMA PALLIDUM: CPT | Performed by: OBSTETRICS & GYNECOLOGY

## 2018-05-15 PROCEDURE — 90471 IMMUNIZATION ADMIN: CPT

## 2018-05-15 PROCEDURE — 90715 TDAP VACCINE 7 YRS/> IM: CPT

## 2018-05-15 PROCEDURE — 00000218 ZZHCL STATISTIC OBHBG - HEMOGLOBIN: Performed by: OBSTETRICS & GYNECOLOGY

## 2018-05-15 PROCEDURE — 99207 ZZC PRENATAL VISIT: CPT | Performed by: OBSTETRICS & GYNECOLOGY

## 2018-05-15 PROCEDURE — 82950 GLUCOSE TEST: CPT | Performed by: OBSTETRICS & GYNECOLOGY

## 2018-05-15 PROCEDURE — 36415 COLL VENOUS BLD VENIPUNCTURE: CPT | Performed by: OBSTETRICS & GYNECOLOGY

## 2018-05-15 NOTE — MR AVS SNAPSHOT
After Visit Summary   5/15/2018    Yulisa Rai    MRN: [id number]           Patient Information     Date Of Birth          1980        Visit Information        Provider Department      5/15/2018 9:10 AM Lorraine Patino MD [facility]        Today's Diagnoses     Encounter for  screening of mother    -  1    Encounter for supervision of primigravida of advanced maternal age in third trimester           Follow-ups after your visit        Future tests that were ordered for you today     Open Future Orders        Priority Expected Expires Ordered    TDAP VACCINE (ADACEL) Routine  2018    VACCINE ADMINISTRATION, INITIAL Routine  2018            Who to contact     If you have questions or need follow up information about today's clinic visit or your schedule please contact [facility] directly at [phone].  Normal or non-critical lab and imaging results will be communicated to you by Moneytreehart, letter or phone within 4 business days after the clinic has received the results. If you do not hear from us within 7 days, please contact the clinic through Moneytreehart or phone. If you have a critical or abnormal lab result, we will notify you by phone as soon as possible.  Submit refill requests through Brass Monkey or call your pharmacy and they will forward the refill request to us. Please allow 3 business days for your refill to be completed.          Additional Information About Your Visit        MyChart Information     Brass Monkey gives you secure access to your electronic health record. If you see a primary care provider, you can also send messages to your care team and make appointments. If you have questions, please call your primary care clinic.  If you do not have a primary care provider, please call [phone] and they will assist you.        Care EveryWhere ID     This is your Care EveryWhere ID. This could be used by  other organizations to access your [place] medical records  [id number]        Your Vitals Were     Last Period BMI (Body Mass Index)                11/01/2017 33.64 kg/m2           Blood Pressure from Last 3 Encounters:   05/15/18 122/78   04/18/18 120/68   03/14/18 110/62    Weight from Last 3 Encounters:   05/15/18 214 lb 12.8 oz (97.4 kg)   04/18/18 208 lb (94.3 kg)   03/14/18 202 lb (91.6 kg)              We Performed the Following     Treponema Abs w Reflex to RPR and Titer          Today's Medication Changes          These changes are accurate as of 5/15/18  9:27 AM.  If you have any questions, ask your nurse or doctor.               Start taking these medicines.        Dose/Directions    order for DME   Used for:  Encounter for supervision of primigravida of advanced maternal age in third trimester   Started by:  Lorraine Patino MD        Equipment being ordered: breast pump   Quantity:  1 pump   Refills:  0            Where to get your medicines      Some of these will need a paper prescription and others can be bought over the counter.  Ask your nurse if you have questions.     Bring a paper prescription for each of these medications     order for DME                Primary Care Provider Office Phone # Fax #    Melvina Pennington [phone]       [place] [address] [place] [address]        Equal Access to Services     AYDEE MAJOR AH: Hadii lucy stoddard hadasho Sobharat, waaxda luqadaha, qaybta kaalmada phyllis, shona gordon. So [facility] [phone].    ATENCIÓN: Si habla español, tiene a burkett disposición servicios gratuitos de asistencia lingüística. Llame al [phone].    We comply with applicable federal civil rights laws and [place] laws. We do not discriminate on the basis of race, color, national origin, age, disability, sex, sexual orientation, or gender identity.            Thank you!     Thank you for choosing [facility]  for your care. Our goal is always to provide you with excellent care. Hearing back from our patients is one way we can continue to improve our services. Please take a few minutes to complete the written survey that you may receive in the mail after your visit with us. Thank you!             Your Updated Medication List - Protect others around you: Learn how to safely use, store and throw away your medicines at www.disposemymeds.org.          This list is accurate as of 5/15/18  9:27 AM.  Always use your most recent med list.                   Brand Name Dispense Instructions for use Diagnosis    albuterol 90 MCG/ACT inhaler     1 Inhaler    Inhale 2 puffs into the lungs every 4 hours as needed for shortness of breath / dyspnea (or 20 minutes prior to excercise).    Intermittent asthma       ASPIRIN PO      Take 81 mg by mouth daily    Supervision of high risk pregnancy in first trimester       order for DME     1 pump    Equipment being ordered: breast pump    Encounter for supervision of primigravida of advanced maternal age in third trimester       prenatal multivitamin plus iron 27-0.8 MG Tabs per tablet      Take 1 tablet by mouth daily    Supervision of high risk pregnancy in first trimester       TUMS PO           VALTREX 1000 mg tablet   Generic drug:  valACYclovir     8    2 tabs po bid x 1 day    Herpes simplex without mention of complication

## 2018-05-15 NOTE — PROGRESS NOTES
Syphilis is a sexually transmitted disease that can cause birth defects in the babies of untreated mothers. Every pregnant patient is tested for syphilis early in each pregnancy as part of the routine lab work. The [place] [facility] has seen an increase in the rate of syphilis in [place]. The [facility] now recommends testing for syphilis 3 times during a pregnancy, the new prenatal visit, 28 weeks and when admitted for delivery. Patient accepts lab testing for syphilis.      Patient states she started taking Tums for heartburn. Wants to know if she can get her RX for a breast pump. Has questions about ultrasound-anterior placenta.

## 2018-05-16 LAB — T PALLIDUM AB SER QL: NONREACTIVE

## 2018-05-24 ENCOUNTER — TELEPHONE (OUTPATIENT)
Dept: OBGYN | Facility: CLINIC | Age: 38
End: 2018-05-24

## 2018-05-24 NOTE — TELEPHONE ENCOUNTER
Pt calling with sx's of uterine tightening.  Tuesday, 5/22 experienced uterine tightening x2 occasions.  Now on vacation and traveled all day yesterday, 5/23, had more regular tightening so she drank a bunch water and rested for a few hours and they went away. Today now, she has felt some tightening again after walking around, nothing consistent, is drinking a lot of water but wondering when she should seek medical attention or what she should do. No cx's, cramping, LOF or VB at this time. Reported +FM.    Reassured pt she did the right thing with increasing the fluids, especially in the heat and increase in her usual activity. If the tightening returned and was consistent, more than 6 in an hour despite increasing the fluids and resting for 2 hours, she should be seen. Also if she notes any VB or LOF or DFM.    Pt verbalized understanding and no further questions.    Jaycee CURTIS RN

## 2018-05-29 ENCOUNTER — PRENATAL OFFICE VISIT (OUTPATIENT)
Dept: OBGYN | Facility: CLINIC | Age: 38
End: 2018-05-29
Payer: COMMERCIAL

## 2018-05-29 VITALS — SYSTOLIC BLOOD PRESSURE: 122 MMHG | WEIGHT: 218.6 LBS | BODY MASS INDEX: 34.24 KG/M2 | DIASTOLIC BLOOD PRESSURE: 70 MMHG

## 2018-05-29 DIAGNOSIS — O09.513 ENCOUNTER FOR SUPERVISION OF PRIMIGRAVIDA OF ADVANCED MATERNAL AGE IN THIRD TRIMESTER: Primary | ICD-10-CM

## 2018-05-29 PROCEDURE — 99207 ZZC PRENATAL VISIT: CPT | Performed by: OBSTETRICS & GYNECOLOGY

## 2018-05-29 NOTE — PROGRESS NOTES
Patient only had some mild suprapubic tightening  Discussed hydration, reduced activity  Discussed warning signs of PTL to call for  Return 2 wks  No spotting or vag d/c noted

## 2018-05-29 NOTE — MR AVS SNAPSHOT
After Visit Summary   5/29/2018    Yulisa Rai    MRN: [id number]           Patient Information     Date Of Birth          1980        Visit Information        Provider Department      5/29/2018 8:30 AM Lorraine Patino MD [facility] [place]        Today's Diagnoses     Encounter for supervision of primigravida of advanced maternal age in third trimester    -  1       Follow-ups after your visit        Your next 10 appointments already scheduled     Jun 12, 2018  8:20 AM CDT   ESTABLISHED PRENATAL with Lorraine Patino MD   [facility])    [address] [place] [id number] [phone]              Who to contact     If you have questions or need follow up information about today's clinic visit or your schedule please contact [facility] directly at [phone].  Normal or non-critical lab and imaging results will be communicated to you by Kallfly Pte Ltdhart, letter or phone within 4 business days after the clinic has received the results. If you do not hear from us within 7 days, please contact the clinic through Kallfly Pte Ltdhart or phone. If you have a critical or abnormal lab result, we will notify you by phone as soon as possible.  Submit refill requests through Toplist or call your pharmacy and they will forward the refill request to us. Please allow 3 business days for your refill to be completed.          Additional Information About Your Visit        MyChart Information     Toplist gives you secure access to your electronic health record. If you see a primary care provider, you can also send messages to your care team and make appointments. If you have questions, please call your primary care clinic.  If you do not have a primary care provider, please call [phone] and they will assist you.        Care EveryWhere ID     This is your Care EveryWhere ID. This could be used by other  organizations to access your [place] medical records  [id number]        Your Vitals Were     Last Period BMI (Body Mass Index)                11/01/2017 34.24 kg/m2           Blood Pressure from Last 3 Encounters:   05/29/18 122/70   05/15/18 122/78   04/18/18 120/68    Weight from Last 3 Encounters:   05/29/18 218 lb 9.6 oz (99.2 kg)   05/15/18 214 lb 12.8 oz (97.4 kg)   04/18/18 208 lb (94.3 kg)              Today, you had the following     No orders found for display       Primary Care Provider Office Phone # Fax #    Melvina Pennington [phone]       [place] [address]        Equal Access to Services     AYDEE MAJOR : Hadii lucy stoddard hadasho Soomaali, waaxda luqadaha, qaybta kaalmada adeegyada, shona arellano . So [facility] [phone].    ATENCIÓN: Si habla español, tiene a burkett disposición servicios gratuitos de asistencia lingüística. Llame al [phone].    We comply with applicable federal civil rights laws and [place] laws. We do not discriminate on the basis of race, color, national origin, age, disability, sex, sexual orientation, or gender identity.            Thank you!     Thank you for choosing [facility]  for your care. Our goal is always to provide you with excellent care. Hearing back from our patients is one way we can continue to improve our services. Please take a few minutes to complete the written survey that you may receive in the mail after your visit with us. Thank you!             Your Updated Medication List - Protect others around you: Learn how to safely use, store and throw away your medicines at www.disposemymeds.org.          This list is accurate as of 5/29/18  8:49 AM.  Always use your most recent med list.                   Brand Name Dispense Instructions for use Diagnosis    albuterol 90 MCG/ACT inhaler     1 Inhaler    Inhale 2 puffs into the lungs every 4 hours as needed  for shortness of breath / dyspnea (or 20 minutes prior to excercise).    Intermittent asthma       ASPIRIN PO      Take 81 mg by mouth daily    Supervision of high risk pregnancy in first trimester       order for DME     1 pump    Equipment being ordered: breast pump    Encounter for supervision of primigravida of advanced maternal age in third trimester       prenatal multivitamin plus iron 27-0.8 MG Tabs per tablet      Take 1 tablet by mouth daily    Supervision of high risk pregnancy in first trimester       TUMS PO           VALTREX 1000 mg tablet   Generic drug:  valACYclovir     8    2 tabs po bid x 1 day    Herpes simplex without mention of complication

## 2018-05-29 NOTE — PROGRESS NOTES
Patient had some [place] over the weekend during traveling. Has questions about restrictions.

## 2018-06-12 ENCOUNTER — PRENATAL OFFICE VISIT (OUTPATIENT)
Dept: OBGYN | Facility: CLINIC | Age: 38
End: 2018-06-12
Payer: COMMERCIAL

## 2018-06-12 VITALS — DIASTOLIC BLOOD PRESSURE: 70 MMHG | SYSTOLIC BLOOD PRESSURE: 118 MMHG | BODY MASS INDEX: 34.55 KG/M2 | WEIGHT: 220.6 LBS

## 2018-06-12 DIAGNOSIS — O09.513 ENCOUNTER FOR SUPERVISION OF PRIMIGRAVIDA OF ADVANCED MATERNAL AGE IN THIRD TRIMESTER: Primary | ICD-10-CM

## 2018-06-12 PROCEDURE — 99207 ZZC PRENATAL VISIT: CPT | Performed by: OBSTETRICS & GYNECOLOGY

## 2018-06-12 NOTE — MR AVS SNAPSHOT
After Visit Summary   6/12/2018    Yulisa Rai    MRN: [id number]           Patient Information     Date Of Birth          1980        Visit Information        Provider Department      6/12/2018 8:20 AM Lorraine Patino MD [facility] [place]        Today's Diagnoses     Encounter for supervision of primigravida of advanced maternal age in third trimester    -  1       Follow-ups after your visit        Who to contact     If you have questions or need follow up information about today's clinic visit or your schedule please contact [facility] directly at [phone].  Normal or non-critical lab and imaging results will be communicated to you by MyChart, letter or phone within 4 business days after the clinic has received the results. If you do not hear from us within 7 days, please contact the clinic through Communication Intelligencet or phone. If you have a critical or abnormal lab result, we will notify you by phone as soon as possible.  Submit refill requests through [employer] Plan or call your pharmacy and they will forward the refill request to us. Please allow 3 business days for your refill to be completed.          Additional Information About Your Visit        MyChart Information     [employer] Plan gives you secure access to your electronic health record. If you see a primary care provider, you can also send messages to your care team and make appointments. If you have questions, please call your primary care clinic.  If you do not have a primary care provider, please call [phone] and they will assist you.        Care EveryWhere ID     This is your Care EveryWhere ID. This could be used by other organizations to access your [place] medical records  [id number]        Your Vitals Were     Last Period BMI (Body Mass Index)                11/01/2017 34.55 kg/m2           Blood Pressure from Last 3 Encounters:   06/12/18 118/70   05/29/18 122/70   05/15/18 122/78    Weight  from Last 3 Encounters:   06/12/18 220 lb 9.6 oz (100.1 kg)   05/29/18 218 lb 9.6 oz (99.2 kg)   05/15/18 214 lb 12.8 oz (97.4 kg)              Today, you had the following     No orders found for display       Primary Care Provider Office Phone # Fax #    Melvina Pennington [phone]       [place] [address]        Equal Access to Services     [facility] : Hadii aad ku hadasho Soomaali, waaxda luqadaha, qaybta kaalmada adeegyada, waxay idiin hayaan adeeg kharash adan . So [facility] [phone].    ATENCIÓN: Si habla español, tiene a burkett disposición servicios gratuitos de asistencia lingüística. LlUK Healthcare [phone].    We comply with applicable federal civil rights laws and [place] laws. We do not discriminate on the basis of race, color, national origin, age, disability, sex, sexual orientation, or gender identity.            Thank you!     Thank you for choosing [facility] [place]  for your care. Our goal is always to provide you with excellent care. Hearing back from our patients is one way we can continue to improve our services. Please take a few minutes to complete the written survey that you may receive in the mail after your visit with us. Thank you!             Your Updated Medication List - Protect others around you: Learn how to safely use, store and throw away your medicines at www.disposemymeds.org.          This list is accurate as of 6/12/18  8:41 AM.  Always use your most recent med list.                   Brand Name Dispense Instructions for use Diagnosis    albuterol 90 MCG/ACT inhaler     1 Inhaler    Inhale 2 puffs into the lungs every 4 hours as needed for shortness of breath / dyspnea (or 20 minutes prior to excercise).    Intermittent asthma       ASPIRIN PO      Take 81 mg by mouth daily    Supervision of high risk pregnancy in first trimester       order for DME     1 pump    Equipment being ordered: breast pump     Encounter for supervision of primigravida of advanced maternal age in third trimester       prenatal multivitamin plus iron 27-0.8 MG Tabs per tablet      Take 1 tablet by mouth daily    Supervision of high risk pregnancy in first trimester       TUMS PO           VALTREX 1000 mg tablet   Generic drug:  valACYclovir     8    2 tabs po bid x 1 day    Herpes simplex without mention of complication

## 2018-06-14 ENCOUNTER — TELEPHONE (OUTPATIENT)
Dept: OBGYN | Facility: CLINIC | Age: 38
End: 2018-06-14

## 2018-06-18 ENCOUNTER — NURSE TRIAGE (OUTPATIENT)
Dept: NURSING | Facility: CLINIC | Age: 38
End: 2018-06-18

## 2018-06-18 ENCOUNTER — HOSPITAL ENCOUNTER (OUTPATIENT)
Facility: CLINIC | Age: 38
Discharge: HOME | End: 2018-06-18
Attending: OBSTETRICS & GYNECOLOGY | Admitting: OBSTETRICS & GYNECOLOGY
Payer: COMMERCIAL

## 2018-06-18 VITALS — TEMPERATURE: 98.2 F | DIASTOLIC BLOOD PRESSURE: 83 MMHG | SYSTOLIC BLOOD PRESSURE: 124 MMHG

## 2018-06-18 PROCEDURE — G0463 HOSPITAL OUTPT CLINIC VISIT: HCPCS | Mod: 25

## 2018-06-18 PROCEDURE — G0463 HOSPITAL OUTPT CLINIC VISIT: HCPCS

## 2018-06-18 PROCEDURE — 59025 FETAL NON-STRESS TEST: CPT

## 2018-06-18 PROCEDURE — 40000809 ZZH STATISTIC NO DOCUMENTATION TO SUPPORT CHARGE

## 2018-06-18 PROCEDURE — 59025 FETAL NON-STRESS TEST: CPT | Mod: 26 | Performed by: OBSTETRICS & GYNECOLOGY

## 2018-06-18 NOTE — PLAN OF CARE
"0714-Pt presents to [facility], ambulatory, accompanied by  Juan Luis, stating \"I haven't felt my baby move since 0500 this morning\" as the reason for her visit. Pt is  32w5d of Dr. Patino. Verbal consent for EFM/toco received. Monitors applied. Fht's present but elevated in the 170's. Pt states she hasn't eaten yet and only had some diet coke to drink  this morning. Water, juice and crackers given to pt. Prenatal record reviewed. Admission assessment completed. Pt denies VB, LOF, pelvic pressure, low back pain, or cramping. Plan to monitor for 30 minutes and update MD.   0800-NST obtained. Pt reports +FM at this time.   0807- Dr. Patino updated regarding, but not limited to, reactive NST, uterine activity and +FM since admission. Discharge order received.   0840-Discharge instructions reviewed with pt and spouse. Both verbalized understanding and agreement. Pt d/c'd ambulatory to home.   "

## 2018-06-18 NOTE — IP AVS SNAPSHOT
[address], [address]    [facility] [address]    Phone:  [phone]                                       After Visit Summary   6/18/2018    Yulisa Ria    MRN: [id number]           After Visit Summary Signature Page     I have received my discharge instructions, and my questions have been answered. I have discussed any challenges I see with this plan with the nurse or doctor.    ..........................................................................................................................................  Patient/Patient Representative Signature      ..........................................................................................................................................  Patient Representative Print Name and Relationship to Patient    ..................................................               ................................................  Date                                            Time    ..........................................................................................................................................  Reviewed by Signature/Title    ...................................................              ..............................................  Date                                                            Time

## 2018-06-18 NOTE — TELEPHONE ENCOUNTER
6:55 a.m. Paged Dr Stacy Vicente to call patient directly. I advised the patient to call back if she hasn't heard from on call MD within 30 minutes.   Patient doesn't want to wait for the call back and is heading into the ER instead of waiting because of an history of miscarriages.  Patient hasn't felt the baby move at all this morning, so far. I did call and notify labor and delivery that they may be getting Yulisa, that she is headed to the ER and that MD was paged.  Carla Yang RN-[facility]    Reason for Disposition    [1] Pregnant 23 or more weeks AND [2] baby moving less today by kick count  (e.g., kick count < 5 in 1 hour or < 10 in 2 hours)    Additional Information    Negative: Sounds like a life-threatening emergency to the triager    Negative: Injury to abdomen    Negative: [1] Pregnant > 36 weeks AND [2] having contractions or other symptoms of labor    Negative: [1] Pregnant < 37 weeks AND [2] having contractions or other symptoms of labor    Negative: [1] Pregnant > 20 weeks AND [2] abdominal pain    Negative: [1] Pregnant > 20 weeks AND [2] vaginal bleeding or spotting    Negative: Blurred vision or visual change    Negative: [1] SEVERE headache AND [2] not relieved with acetaminophen (e.g., Tylenol)    Negative: Leakage of fluid from vagina    Protocols used: PREGNANCY - DECREASED FETAL MOVEMENT-ADULT-

## 2018-06-18 NOTE — IP AVS SNAPSHOT
MRN:[id number]                      After Visit Summary   6/18/2018    Yulisa Rai    MRN: [id number]           Thank you!     Thank you for choosing [facility] for your care. Our goal is always to provide you with excellent care. Hearing back from our patients is one way we can continue to improve our services. Please take a few minutes to complete the written survey that you may receive in the mail after you visit with us. Thank you!        Patient Information     Date Of Birth          1980        About your hospital stay     You were admitted on:  June 18, 2018 You last received care in the:  [facility]    You were discharged on:  June 18, 2018       Who to Call     For medical emergencies, please call 911.  For non-urgent questions about your medical care, please call your primary care provider or clinic, [phone]          Attending Provider     Provider Lorraine Cox MD OB/Gyn       Primary Care Provider Office Phone # Fax #    Melvina ROSENDO Pennington [phone]      Your next 10 appointments already scheduled     Jun 26, 2018  9:20 AM CDT   ESTABLISHED PRENATAL with Lorraine Patino MD   [facility] ([facility])    [address] [place] [address] [id number] [phone]              Further instructions from your care team       Discharge Instruction for Undelivered Patients      You were seen for: Fetal Assessment  We Consulted: Dr. Lorraine Patino  You had (Test or Medicine):Fetal Non-Stress Test     Diet:   Drink 8 to 12 glasses of liquids (milk, juice, water) every day.  You may eat meals and snacks.     Activity:  Count fetal kicks everyday (see handout)  Call your doctor or nurse midwife if your baby is moving less than usual.     Call your provider if you notice:  Swelling in your face or increased swelling in your hands or legs.  Headaches that are not relieved by Tylenol  (acetaminophen).  Changes in your vision (blurring: seeing spots or stars.)  Nausea (sick to your stomach) and vomiting (throwing up).   Weight gain of 5 pounds or more per week.  Heartburn that doesn't go away.  Signs of bladder infection: pain when you urinate (use the toilet), need to go more often and more urgently.  The bag of peña (rupture of membranes) breaks, or you notice leaking in your underwear.  Bright red blood in your underwear.  Abdominal (lower belly) or stomach pain.  For first baby: Contractions (tightening) less than 5 minutes apart for one hour or more.  *If less than 34 weeks: Contractions (tightenings) more than 6 times in one hour.  Increase or change in vaginal discharge (note the color and amount)    Follow-up:  As scheduled in the clinic          Pending Results     No orders found from 6/16/2018 to 6/19/2018.            Admission Information     Date & Time Provider Department Dept. Phone    6/18/2018 Lorraine Patino MD [facility] [phone]      Your Vitals Were     Blood Pressure Temperature Last Period             124/83 98.2  F (36.8  C) (Temporal) 11/01/2017         MyChart Information     [employer] gives you secure access to your electronic health record. If you see a primary care provider, you can also send messages to your care team and make appointments. If you have questions, please call your primary care clinic.  If you do not have a primary care provider, please call [phone] and they will assist you.        Care EveryWhere ID     This is your Care EveryWhere ID. This could be used by other organizations to access your [place] medical records  [id number]        Equal Access to Services     [facility]: Yuni Linares, wafilipe bear, qashona sepinosa. So [facility] [phone].    ATENCIÓN: Si habla español, tiene a burkett disposición servicios gratuitos de asistencia lingüística. Llame al  [phone].    We comply with applicable federal civil rights laws and [place] laws. We do not discriminate on the basis of race, color, national origin, age, disability, sex, sexual orientation, or gender identity.               Review of your medicines      UNREVIEWED medicines. Ask your doctor about these medicines        Dose / Directions    albuterol 90 MCG/ACT inhaler   Used for:  Intermittent asthma        Dose:  2 puff   Inhale 2 puffs into the lungs every 4 hours as needed for shortness of breath / dyspnea (or 20 minutes prior to excercise).   Quantity:  1 Inhaler   Refills:  prn       ASPIRIN PO   Used for:  Supervision of high risk pregnancy in first trimester        Dose:  81 mg   Take 81 mg by mouth daily   Refills:  0       prenatal multivitamin plus iron 27-0.8 MG Tabs per tablet   Used for:  Supervision of high risk pregnancy in first trimester        Dose:  1 tablet   Take 1 tablet by mouth daily   Refills:  0       TUMS PO        Refills:  0       VALTREX 1000 mg tablet   Used for:  Herpes simplex without mention of complication   Generic drug:  valACYclovir        2 tabs po bid x 1 day   Quantity:  8   Refills:  2         CONTINUE these medicines which have NOT CHANGED        Dose / Directions    order for DME   Used for:  Encounter for supervision of primigravida of advanced maternal age in third trimester        Equipment being ordered: breast pump   Quantity:  1 pump   Refills:  0                Protect others around you: Learn how to safely use, store and throw away your medicines at www.disposemymeds.org.             Medication List: This is a list of all your medications and when to take them. Check marks below indicate your daily home schedule. Keep this list as a reference.      Medications           Morning Afternoon Evening Bedtime As Needed    albuterol 90 MCG/ACT inhaler   Inhale 2 puffs into the lungs every 4 hours as needed for shortness of breath / dyspnea (or 20 minutes prior  to excercise).                                ASPIRIN PO   Take 81 mg by mouth daily                                order for DME   Equipment being ordered: breast pump                                prenatal multivitamin plus iron 27-0.8 MG Tabs per tablet   Take 1 tablet by mouth daily                                TUMS PO                                VALTREX 1000 mg tablet   2 tabs po bid x 1 day   Generic drug:  valACYclovir

## 2018-06-18 NOTE — DISCHARGE INSTRUCTIONS
Discharge Instruction for Undelivered Patients      You were seen for: Fetal Assessment  We Consulted: Dr. Lorraine Patino  You had (Test or Medicine):Fetal Non-Stress Test     Diet:   Drink 8 to 12 glasses of liquids (milk, juice, water) every day.  You may eat meals and snacks.     Activity:  Count fetal kicks everyday (see handout)  Call your doctor or nurse midwife if your baby is moving less than usual.     Call your provider if you notice:  Swelling in your face or increased swelling in your hands or legs.  Headaches that are not relieved by Tylenol (acetaminophen).  Changes in your vision (blurring: seeing spots or stars.)  Nausea (sick to your stomach) and vomiting (throwing up).   Weight gain of 5 pounds or more per week.  Heartburn that doesn't go away.  Signs of bladder infection: pain when you urinate (use the toilet), need to go more often and more urgently.  The bag of peña (rupture of membranes) breaks, or you notice leaking in your underwear.  Bright red blood in your underwear.  Abdominal (lower belly) or stomach pain.  For first baby: Contractions (tightening) less than 5 minutes apart for one hour or more.  *If less than 34 weeks: Contractions (tightenings) more than 6 times in one hour.  Increase or change in vaginal discharge (note the color and amount)    Follow-up:  As scheduled in the clinic

## 2018-06-19 NOTE — PROGRESS NOTES
38 y.o  presented for decreased fetal movement at 32+5. Category 1 tracing with initial baseline of 160 and minimal variability but after about 15 min baseline shifted down to 150 and then 145 with accels, no decels and good variability. Irregular ctx and some irritability. Patient d/c'd to home.

## 2018-06-26 ENCOUNTER — PRENATAL OFFICE VISIT (OUTPATIENT)
Dept: OBGYN | Facility: CLINIC | Age: 38
End: 2018-06-26
Payer: COMMERCIAL

## 2018-06-26 VITALS — BODY MASS INDEX: 34.99 KG/M2 | DIASTOLIC BLOOD PRESSURE: 80 MMHG | WEIGHT: 223.4 LBS | SYSTOLIC BLOOD PRESSURE: 120 MMHG

## 2018-06-26 DIAGNOSIS — O09.513 ENCOUNTER FOR SUPERVISION OF PRIMIGRAVIDA OF ADVANCED MATERNAL AGE IN THIRD TRIMESTER: Primary | ICD-10-CM

## 2018-06-26 PROCEDURE — 99207 ZZC PRENATAL VISIT: CPT | Performed by: OBSTETRICS & GYNECOLOGY

## 2018-06-26 NOTE — MR AVS SNAPSHOT
After Visit Summary   6/26/2018    Yulisa Rai    MRN: [id number]           Patient Information     Date Of Birth          1980        Visit Information        Provider Department      6/26/2018 9:20 AM Lorraine Patino MD [facility]        Today's Diagnoses     Encounter for supervision of primigravida of advanced maternal age in third trimester    -  1       Follow-ups after your visit        Who to contact     If you have questions or need follow up information about today's clinic visit or your schedule please contact [facility] directly at [phone].  Normal or non-critical lab and imaging results will be communicated to you by MyChart, letter or phone within 4 business days after the clinic has received the results. If you do not hear from us within 7 days, please contact the clinic through [employer] or phone. If you have a critical or abnormal lab result, we will notify you by phone as soon as possible.  Submit refill requests through [employer] or call your pharmacy and they will forward the refill request to us. Please allow 3 business days for your refill to be completed.          Additional Information About Your Visit        MyChart Information     [employer] gives you secure access to your electronic health record. If you see a primary care provider, you can also send messages to your care team and make appointments. If you have questions, please call your primary care clinic.  If you do not have a primary care provider, please call [phone] and they will assist you.        Care EveryWhere ID     This is your Care EveryWhere ID. This could be used by other organizations to access your [facility] medical records  [id number]        Your Vitals Were     Last Period BMI (Body Mass Index)                11/01/2017 34.99 kg/m2           Blood Pressure from Last 3 Encounters:   06/26/18 120/80   06/18/18 124/83   06/12/18 118/70    Weight  from Last 3 Encounters:   06/26/18 223 lb 6.4 oz (101.3 kg)   06/12/18 220 lb 9.6 oz (100.1 kg)   05/29/18 218 lb 9.6 oz (99.2 kg)              Today, you had the following     No orders found for display       Primary Care Provider Office Phone # Fax #    Melvina Pennington [phone]       [address] [address] [place] [address]        Equal Access to Services     [facility] : Hadii aad ku hadasho Soomaali, waaxda luqadaha, qaybta kaalmada adeegyada, waxay idiin hayaan adeeg kharash adan . So [facility] [phone].    ATENCIÓN: Si habla español, tiene a burkett disposición servicios gratuitos de asistencia lingüística. LlSt. [facility] [phone].    We comply with applicable federal civil rights laws and [place] laws. We do not discriminate on the basis of race, color, national origin, age, disability, sex, sexual orientation, or gender identity.            Thank you!     Thank you for choosing [facility]  for your care. Our goal is always to provide you with excellent care. Hearing back from our patients is one way we can continue to improve our services. Please take a few minutes to complete the written survey that you may receive in the mail after your visit with us. Thank you!             Your Updated Medication List - Protect others around you: Learn how to safely use, store and throw away your medicines at www.disposemymeds.org.          This list is accurate as of 6/26/18  9:32 AM.  Always use your most recent med list.                   Brand Name Dispense Instructions for use Diagnosis    albuterol 90 MCG/ACT inhaler     1 Inhaler    Inhale 2 puffs into the lungs every 4 hours as needed for shortness of breath / dyspnea (or 20 minutes prior to excercise).    Intermittent asthma       ASPIRIN PO      Take 81 mg by mouth daily    Supervision of high risk pregnancy in first trimester       order for DME     1 pump    Equipment being ordered: breast pump     Encounter for supervision of primigravida of advanced maternal age in third trimester       prenatal multivitamin plus iron 27-0.8 MG Tabs per tablet      Take 1 tablet by mouth daily    Supervision of high risk pregnancy in first trimester       TUMS PO           VALTREX 1000 mg tablet   Generic drug:  valACYclovir     8    2 tabs po bid x 1 day    Herpes simplex without mention of complication

## 2018-07-07 ENCOUNTER — NURSE TRIAGE (OUTPATIENT)
Dept: NURSING | Facility: CLINIC | Age: 38
End: 2018-07-07

## 2018-07-07 NOTE — TELEPHONE ENCOUNTER
"    Additional Information    Negative: [1] Vaginal bleeding AND [2] pregnant > 20 weeks    Negative: [1] Vaginal bleeding AND [2] pregnant < 20 weeks    Negative: [1] Having contractions or other symptoms of labor AND [2] < 37 weeks pregnant (i.e., )    Negative: [1] Having contractions or other symptoms of labor AND [2] > 36 weeks pregnant (i.e., term pregnancy)    Negative: Leakage of fluid (trickle, gush) from the vagina    Negative: Foreign body in vagina (e.g., tampon)    Negative: Pain or burning with urination is main symptom    Negative: [1] Pregnant 23 or more weeks AND [2] baby is moving less today (e.g., kick count < 5 in 1 hour or < 10 in 2 hours)    Negative: Patient sounds very sick or weak to the triager    Negative: [1] Constant abdominal pain AND [2] present > 2 hours    Negative: [1] Intermittent lower abdominal pain AND [2] present > 24 hours    Negative: [1] Pregnant 24-36 weeks () AND [2] pinkish or brownish mucous discharge    Negative: [1] Yellow or green vaginal discharge AND [2] fever    Negative: Painful rash with tiny water blisters    Negative: [1] Rash (e.g., redness, tiny bumps, sore) of genital area AND [2] present > 24 hours    Negative: Abnormal color vaginal discharge (i.e., yellow, green, gray)    Negative: Bad smelling vaginal discharge    Negative: Tender lump (swelling or \"ball\") at vaginal opening    Negative: [1] Symptoms of a \"yeast infection\" (i.e., itchy, white discharge, not bad smelling) AND [2] not improved > 3 days following CARE ADVICE    Negative: Patient is worried about sexually transmitted disease (STD)    Negative: Pain with sexual intercourse (dyspareunia)    [1] Pregnant > 36 weeks (term) AND [2] passed a small glob or chunk of mucous (may look like gelatin or snot) (all triage questions negative)    Negative: [1] Pregnant > 36 weeks (term) AND [2] pinkish or brownish mucous discharge (all triage questions negative)    Answer Assessment - Initial " "Assessment Questions  1. DISCHARGE: \"Describe the discharge.\" (e.g., white, yellow, green, gray, foamy, cottage cheese-like)      Clear-yellowish plug of mucous  2. ODOR: \"Is there a bad odor?\"      no  3. ONSET: \"When did the discharge begin?\"      10 minutes ago  4. RASH: \"Is there a rash in that area?\" If so, ask: \"Describe it.\" (e.g., redness, blisters, sores, bumps)      no  5. ABDOMINAL PAIN: \"Are you having any abdominal pain?\" If yes: \"What does it feel like?\" (e.g., crampy, dull, intermittent, constant)       none  6. ABDOMINAL PAIN SEVERITY: If present, ask: \"How bad is it?\"  (e.g., Scale 1-10; mild, moderate, or severe)    - MILD (1-3): doesn't interfere with normal activities, abdomen soft and not tender to touch     - MODERATE (4-7): interferes with normal activities or awakens from sleep, tender to touch     - SEVERE (8-10): excruciating pain, doubled over, unable to do any normal activities      none  7. CAUSE: \"What do you think is causing the discharge?\"      Mucous plug  8. OTHER SYMPTOMS: \"Do you have any other symptoms?\" (e.g., fever, itching, vaginal bleeding, pain with urination)      no  9. YAMIL: \"What date are you expecting to deliver?\"       08/08/18  10. PREGNANCY: \"How many weeks pregnant are you?\"        35 weeks, 3 days    Protocols used: PREGNANCY - VAGINAL DISCHARGE-ADULT-AH      "

## 2018-07-10 ENCOUNTER — PRENATAL OFFICE VISIT (OUTPATIENT)
Dept: OBGYN | Facility: CLINIC | Age: 38
End: 2018-07-10
Payer: COMMERCIAL

## 2018-07-10 VITALS — SYSTOLIC BLOOD PRESSURE: 118 MMHG | WEIGHT: 225 LBS | BODY MASS INDEX: 35.24 KG/M2 | DIASTOLIC BLOOD PRESSURE: 70 MMHG

## 2018-07-10 DIAGNOSIS — Z36.85 ANTENATAL SCREENING FOR STREPTOCOCCUS B: Primary | ICD-10-CM

## 2018-07-10 DIAGNOSIS — O09.511 ENCOUNTER FOR SUPERVISION OF PRIMIGRAVIDA OF ADVANCED MATERNAL AGE IN FIRST TRIMESTER: ICD-10-CM

## 2018-07-10 PROCEDURE — 87653 STREP B DNA AMP PROBE: CPT | Performed by: OBSTETRICS & GYNECOLOGY

## 2018-07-10 PROCEDURE — 99207 ZZC PRENATAL VISIT: CPT | Performed by: OBSTETRICS & GYNECOLOGY

## 2018-07-10 NOTE — MR AVS SNAPSHOT
After Visit Summary   7/10/2018    Yulisa Rai    MRN: [id number]           Patient Information     Date Of Birth          1980        Visit Information        Provider Department      7/10/2018 8:00 AM Lorraine Patino MD [facility] [place]        Today's Diagnoses      screening for streptococcus B    -  1    Encounter for supervision of primigravida of advanced maternal age in first trimester           Follow-ups after your visit        Your next 10 appointments already scheduled     2018  8:00 AM CDT   ESTABLISHED PRENATAL with Lorraine Patino MD   [facility] [place] ([facility] [place])    [address] [place] [address] 100  [place] [address]   [phone]            2018  8:10 AM CDT   ESTABLISHED PRENATAL with Lorraine Patino MD   [facility] [place] ([facility] [place])    [address] [place] [address] 100  [place] [address]   [phone]            2018  8:00 AM CDT   ESTABLISHED PRENATAL with Lorraine Patino MD   [facility] [place] ([facility] [place])    [address] [place] [address] 100  [place] [address]   [phone]            Aug 07, 2018  8:00 AM CDT   ESTABLISHED PRENATAL with Lorraine Patino MD   [facility] [place] ([facility] [place])    [address] [place] [address] 100  [place] [address]   [phone]              Who to contact     If you have questions or need follow up information about today's clinic visit or your schedule please contact [facility] [place] directly at [phone].  Normal or non-critical lab and imaging results will be communicated to you by MyChart, letter or phone within 4 business days after the clinic has received the results. If you do not hear from us within 7 days, please contact the clinic through MyChart or phone. If you have a critical or abnormal lab  result, we will notify you by phone as soon as possible.  Submit refill requests through Farfetch or call your pharmacy and they will forward the refill request to us. Please allow 3 business days for your refill to be completed.          Additional Information About Your Visit        [employer]Ah[employer] Information     Farfetch gives you secure access to your electronic health record. If you see a primary care provider, you can also send messages to your care team and make appointments. If you have questions, please call your primary care clinic.  If you do not have a primary care provider, please call [phone] and they will assist you.        Care EveryWhere ID     This is your Care EveryWhere ID. This could be used by other organizations to access your [place] medical records  [id number]        Your Vitals Were     Last Period BMI (Body Mass Index)                11/01/2017 35.24 kg/m2           Blood Pressure from Last 3 Encounters:   07/10/18 118/70   06/26/18 120/80   06/18/18 124/83    Weight from Last 3 Encounters:   07/10/18 225 lb (102.1 kg)   06/26/18 223 lb 6.4 oz (101.3 kg)   06/12/18 220 lb 9.6 oz (100.1 kg)              We Performed the Following     Group B strep PCR        Primary Care Provider Office Phone # Fax #    Lorraine Patino [phone] [address] [address]        Equal Access to Services     [facility]: Hadii aad ku hadasho Sowaleskaali, waaxda luqadaha, qaybta kaalmada adejozef, shona arellano . So [facility] [phone].    ATENCIÓN: Si habla español, tiene a burkett disposición servicios gratuitos de asistencia lingüística. Naveen al [phone].    We comply with applicable federal civil rights laws and [place] laws. We do not discriminate on the basis of race, color, national origin, age, disability, sex, sexual orientation, or gender identity.            Thank you!     Thank you for choosing [facility]  for  your care. Our goal is always to provide you with excellent care. Hearing back from our patients is one way we can continue to improve our services. Please take a few minutes to complete the written survey that you may receive in the mail after your visit with us. Thank you!             Your Updated Medication List - Protect others around you: Learn how to safely use, store and throw away your medicines at www.disposemymeds.org.          This list is accurate as of 7/10/18  8:23 AM.  Always use your most recent med list.                   Brand Name Dispense Instructions for use Diagnosis    albuterol 90 MCG/ACT inhaler     1 Inhaler    Inhale 2 puffs into the lungs every 4 hours as needed for shortness of breath / dyspnea (or 20 minutes prior to excercise).    Intermittent asthma       ASPIRIN PO      Take 81 mg by mouth daily    Supervision of high risk pregnancy in first trimester       order for DME     1 pump    Equipment being ordered: breast pump    Encounter for supervision of primigravida of advanced maternal age in third trimester       prenatal multivitamin plus iron 27-0.8 MG Tabs per tablet      Take 1 tablet by mouth daily    Supervision of high risk pregnancy in first trimester       TUMS PO           VALTREX 1000 mg tablet   Generic drug:  valACYclovir     8    2 tabs po bid x 1 day    Herpes simplex without mention of complication

## 2018-07-11 LAB
GP B STREP DNA SPEC QL NAA+PROBE: NEGATIVE
SPECIMEN SOURCE: NORMAL

## 2018-07-17 ENCOUNTER — PRENATAL OFFICE VISIT (OUTPATIENT)
Dept: OBGYN | Facility: CLINIC | Age: 38
End: 2018-07-17
Payer: COMMERCIAL

## 2018-07-17 VITALS — DIASTOLIC BLOOD PRESSURE: 82 MMHG | WEIGHT: 227 LBS | BODY MASS INDEX: 35.55 KG/M2 | SYSTOLIC BLOOD PRESSURE: 136 MMHG

## 2018-07-17 DIAGNOSIS — O09.511 ENCOUNTER FOR SUPERVISION OF PRIMIGRAVIDA OF ADVANCED MATERNAL AGE IN FIRST TRIMESTER: Primary | ICD-10-CM

## 2018-07-17 PROCEDURE — 99207 ZZC PRENATAL VISIT: CPT | Performed by: OBSTETRICS & GYNECOLOGY

## 2018-07-17 NOTE — MR AVS SNAPSHOT
After Visit Summary   7/17/2018    Yulisa Rai    MRN: [id number]           Patient Information     Date Of Birth          1980        Visit Information        Provider Department      7/17/2018 8:00 AM Lorraine Patino MD [facility] [place]        Today's Diagnoses     Encounter for supervision of primigravida of advanced maternal age in third trimester    -  1       Follow-ups after your visit        Your next 10 appointments already scheduled     Jul 24, 2018  8:10 AM CDT   ESTABLISHED PRENATAL with Lorraine Patino MD   [facility] [place] ([facility] [place])    [address] [place] [address] 100  [place] [address]   [phone]            Jul 31, 2018  8:00 AM CDT   ESTABLISHED PRENATAL with Lorraine Patino MD   [facility] [place] ([facility] [place])    [address] [place] [address] 100  [place] [address]   [phone]            Aug 07, 2018  8:00 AM CDT   ESTABLISHED PRENATAL with Lorraine Patino MD   [facility] [place] ([facility] [place])    [address] [place] [address] 100  [place] [address]   [phone]              Who to contact     If you have questions or need follow up information about today's clinic visit or your schedule please contact [facility] [place] directly at [phone].  Normal or non-critical lab and imaging results will be communicated to you by MyChart, letter or phone within 4 business days after the clinic has received the results. If you do not hear from us within 7 days, please contact the clinic through MyChart or phone. If you have a critical or abnormal lab result, we will notify you by phone as soon as possible.  Submit refill requests through SMCpros or call your pharmacy and they will forward the refill request to us. Please allow 3 business days for your refill to be completed.          Additional Information About Your  Visit        MyChart Information     XO1hart gives you secure access to your electronic health record. If you see a primary care provider, you can also send messages to your care team and make appointments. If you have questions, please call your primary care clinic.  If you do not have a primary care provider, please call [phone] and they will assist you.        Care EveryWhere ID     This is your Care EveryWhere ID. This could be used by other organizations to access your [place] medical records  [id number]        Your Vitals Were     Last Period BMI (Body Mass Index)                11/01/2017 35.55 kg/m2           Blood Pressure from Last 3 Encounters:   07/17/18 136/82   07/10/18 118/70   06/26/18 120/80    Weight from Last 3 Encounters:   07/17/18 227 lb (103 kg)   07/10/18 225 lb (102.1 kg)   06/26/18 223 lb 6.4 oz (101.3 kg)              Today, you had the following     No orders found for display       Primary Care Provider Office Phone # Fax #    Lorraine Patino [phone] [address] [facility] 100  [facility] [address]        Equal Access to Services     [facility]: Hadii aad ku hadasho Sowaleskaali, waaxda luqadaha, qaybta kaalmada adeegyada, shona arellano . So [facility] [phone].    ATENCIÓN: Si habla español, tiene a burkett disposición servicios gratuitos de asistencia lingüística. Llame al [phone].    We comply with applicable federal civil rights laws and [place] laws. We do not discriminate on the basis of race, color, national origin, age, disability, sex, sexual orientation, or gender identity.            Thank you!     Thank you for choosing [facility] [place]  for your care. Our goal is always to provide you with excellent care. Hearing back from our patients is one way we can continue to improve our services. Please take a few minutes to complete the written survey that you may receive in the mail after your visit with us. Thank  you!             Your Updated Medication List - Protect others around you: Learn how to safely use, store and throw away your medicines at www.disposemymeds.org.          This list is accurate as of 7/17/18  8:10 AM.  Always use your most recent med list.                   Brand Name Dispense Instructions for use Diagnosis    albuterol 90 MCG/ACT inhaler     1 Inhaler    Inhale 2 puffs into the lungs every 4 hours as needed for shortness of breath / dyspnea (or 20 minutes prior to excercise).    Intermittent asthma       ASPIRIN PO      Take 81 mg by mouth daily    Supervision of high risk pregnancy in first trimester       order for DME     1 pump    Equipment being ordered: breast pump    Encounter for supervision of primigravida of advanced maternal age in third trimester       prenatal multivitamin plus iron 27-0.8 MG Tabs per tablet      Take 1 tablet by mouth daily    Supervision of high risk pregnancy in first trimester       TUMS PO           VALTREX 1000 mg tablet   Generic drug:  valACYclovir     8    2 tabs po bid x 1 day    Herpes simplex without mention of complication

## 2018-07-24 ENCOUNTER — PRENATAL OFFICE VISIT (OUTPATIENT)
Dept: OBGYN | Facility: CLINIC | Age: 38
End: 2018-07-24
Payer: COMMERCIAL

## 2018-07-24 VITALS — SYSTOLIC BLOOD PRESSURE: 122 MMHG | BODY MASS INDEX: 35.71 KG/M2 | DIASTOLIC BLOOD PRESSURE: 96 MMHG | WEIGHT: 228 LBS

## 2018-07-24 DIAGNOSIS — O09.511 ENCOUNTER FOR SUPERVISION OF PRIMIGRAVIDA OF ADVANCED MATERNAL AGE IN FIRST TRIMESTER: ICD-10-CM

## 2018-07-24 PROCEDURE — 99207 ZZC PRENATAL VISIT: CPT | Performed by: OBSTETRICS & GYNECOLOGY

## 2018-07-24 NOTE — PROGRESS NOTES
Pressure increasing diastolic 96  Stopped baby ASA  Schedule induction on Friday for hypertension  2-3/80/-1/post

## 2018-07-24 NOTE — MR AVS SNAPSHOT
After Visit Summary   7/24/2018    Yulisa Rai    MRN: [id number]           Patient Information     Date Of Birth          1980        Visit Information        Provider Department      7/24/2018 8:10 AM Lorraine Patino MD [facility] [place]        Today's Diagnoses     Encounter for supervision of primigravida of advanced maternal age in first trimester           Follow-ups after your visit        Your next 10 appointments already scheduled     Jul 31, 2018  8:00 AM CDT   ESTABLISHED PRENATAL with Lorraine Patino MD   [facility] [place] ([facility] [place])    [address] [place] [address]   [phone]            Aug 07, 2018  8:00 AM CDT   ESTABLISHED PRENATAL with Lorraine Patino MD   [facility] [place] ([facility] [place])    [address] [facility] [address]  [place] [address]   [phone]              Who to contact     If you have questions or need follow up information about today's clinic visit or your schedule please contact [facility] [place] directly at [phone].  Normal or non-critical lab and imaging results will be communicated to you by Hibernaterhart, letter or phone within 4 business days after the clinic has received the results. If you do not hear from us within 7 days, please contact the clinic through Hibernaterhart or phone. If you have a critical or abnormal lab result, we will notify you by phone as soon as possible.  Submit refill requests through StackAdapt or call your pharmacy and they will forward the refill request to us. Please allow 3 business days for your refill to be completed.          Additional Information About Your Visit        MyChart Information     StackAdapt gives you secure access to your electronic health record. If you see a primary care provider, you can also send messages to your care team and make appointments. If you have questions,  please call your primary care clinic.  If you do not have a primary care provider, please call [phone] and they will assist you.        Care EveryWhere ID     This is your Care EveryWhere ID. This could be used by other organizations to access your [place] medical records  [id number]        Your Vitals Were     Last Period BMI (Body Mass Index)                11/01/2017 35.71 kg/m2           Blood Pressure from Last 3 Encounters:   07/24/18 (!) 122/96   07/17/18 136/82   07/10/18 118/70    Weight from Last 3 Encounters:   07/24/18 228 lb (103.4 kg)   07/17/18 227 lb (103 kg)   07/10/18 225 lb (102.1 kg)              Today, you had the following     No orders found for display       Primary Care Provider Office Phone # Fax #    Lorraine Patino [phone] [address] [facility] 100  [place] [address]        Equal Access to Services     [facility]: Hadii aad ku hadasho [place], waaxda luqadaha, qaybta kaalmada adeegyada, waxay emperatrizin hayangen jourdan arellano . So [facility] [place] [phone].    ATENCIÓN: Si habla español, tiene a burkett disposición servicios gratuitos de asistencia lingüística. Llame al [phone].    We comply with applicable federal civil rights laws and [place] laws. We do not discriminate on the basis of race, color, national origin, age, disability, sex, sexual orientation, or gender identity.            Thank you!     Thank you for choosing [facility] [place]  for your care. Our goal is always to provide you with excellent care. Hearing back from our patients is one way we can continue to improve our services. Please take a few minutes to complete the written survey that you may receive in the mail after your visit with us. Thank you!             Your Updated Medication List - Protect others around you: Learn how to safely use, store and throw away your medicines at www.disposemymeds.org.          This list is accurate as of 7/24/18  8:39 AM.  Always use  your most recent med list.                   Brand Name Dispense Instructions for use Diagnosis    albuterol 90 MCG/ACT inhaler     1 Inhaler    Inhale 2 puffs into the lungs every 4 hours as needed for shortness of breath / dyspnea (or 20 minutes prior to excercise).    Intermittent asthma       prenatal multivitamin plus iron 27-0.8 MG Tabs per tablet      Take 1 tablet by mouth daily    Supervision of high risk pregnancy in first trimester       TUMS PO           VALTREX 1000 mg tablet   Generic drug:  valACYclovir     8    2 tabs po bid x 1 day    Herpes simplex without mention of complication

## 2018-07-24 NOTE — PROGRESS NOTES
Concerns: having cramps  No vaginal bleeding, LOF, contractions.  No HA, RUQ pain, N/V, visual changes.  Labor precautions discussed.  Prenatal flowsheet information is reviewed.  Induction Juan Patino MD

## 2018-07-27 ENCOUNTER — ANESTHESIA (OUTPATIENT)
Dept: OBGYN | Facility: CLINIC | Age: 38
End: 2018-07-27
Payer: COMMERCIAL

## 2018-07-27 ENCOUNTER — ANESTHESIA EVENT (OUTPATIENT)
Dept: OBGYN | Facility: CLINIC | Age: 38
End: 2018-07-27
Payer: COMMERCIAL

## 2018-07-27 ENCOUNTER — HOSPITAL ENCOUNTER (INPATIENT)
Facility: CLINIC | Age: 38
LOS: 2 days | Discharge: HOME | End: 2018-07-29
Attending: OBSTETRICS & GYNECOLOGY | Admitting: OBSTETRICS & GYNECOLOGY
Payer: COMMERCIAL

## 2018-07-27 LAB
ABO + RH BLD: NORMAL
ABO + RH BLD: NORMAL
ALT SERPL W P-5'-P-CCNC: 13 U/L (ref 0–50)
AST SERPL W P-5'-P-CCNC: 13 U/L (ref 0–45)
BLD GP AB SCN SERPL QL: NORMAL
BLOOD BANK CMNT PATIENT-IMP: NORMAL
CREAT SERPL-MCNC: 0.54 MG/DL (ref 0.52–1.04)
ERYTHROCYTE [DISTWIDTH] IN BLOOD BY AUTOMATED COUNT: 13.4 % (ref 10–15)
GFR SERPL CREATININE-BSD FRML MDRD: >90 ML/MIN/1.7M2
HCT VFR BLD AUTO: 36.1 % (ref 35–47)
HGB BLD-MCNC: 12.1 G/DL (ref 11.7–15.7)
MCH RBC QN AUTO: 30.3 PG (ref 26.5–33)
MCHC RBC AUTO-ENTMCNC: 33.5 G/DL (ref 31.5–36.5)
MCV RBC AUTO: 90 FL (ref 78–100)
PLATELET # BLD AUTO: 114 10E9/L (ref 150–450)
RBC # BLD AUTO: 4 10E12/L (ref 3.8–5.2)
SPECIMEN EXP DATE BLD: NORMAL
T PALLIDUM AB SER QL: NONREACTIVE
WBC # BLD AUTO: 9.6 10E9/L (ref 4–11)

## 2018-07-27 PROCEDURE — 59400 OBSTETRICAL CARE: CPT | Performed by: OBSTETRICS & GYNECOLOGY

## 2018-07-27 PROCEDURE — 86900 BLOOD TYPING SEROLOGIC ABO: CPT | Performed by: OBSTETRICS & GYNECOLOGY

## 2018-07-27 PROCEDURE — 82565 ASSAY OF CREATININE: CPT | Performed by: OBSTETRICS & GYNECOLOGY

## 2018-07-27 PROCEDURE — 25000125 ZZHC RX 250: Performed by: OBSTETRICS & GYNECOLOGY

## 2018-07-27 PROCEDURE — 12000037 ZZH R&B POSTPARTUM INTERMEDIATE

## 2018-07-27 PROCEDURE — 3E0R3BZ INTRODUCTION OF ANESTHETIC AGENT INTO SPINAL CANAL, PERCUTANEOUS APPROACH: ICD-10-PCS | Performed by: ANESTHESIOLOGY

## 2018-07-27 PROCEDURE — 25000128 H RX IP 250 OP 636: Performed by: OBSTETRICS & GYNECOLOGY

## 2018-07-27 PROCEDURE — 84460 ALANINE AMINO (ALT) (SGPT): CPT | Performed by: OBSTETRICS & GYNECOLOGY

## 2018-07-27 PROCEDURE — 84450 TRANSFERASE (AST) (SGOT): CPT | Performed by: OBSTETRICS & GYNECOLOGY

## 2018-07-27 PROCEDURE — 00HU33Z INSERTION OF INFUSION DEVICE INTO SPINAL CANAL, PERCUTANEOUS APPROACH: ICD-10-PCS | Performed by: ANESTHESIOLOGY

## 2018-07-27 PROCEDURE — 25000128 H RX IP 250 OP 636: Performed by: ANESTHESIOLOGY

## 2018-07-27 PROCEDURE — 27110038 ZZH RX 271: Performed by: ANESTHESIOLOGY

## 2018-07-27 PROCEDURE — 25000132 ZZH RX MED GY IP 250 OP 250 PS 637: Performed by: OBSTETRICS & GYNECOLOGY

## 2018-07-27 PROCEDURE — 86780 TREPONEMA PALLIDUM: CPT | Performed by: OBSTETRICS & GYNECOLOGY

## 2018-07-27 PROCEDURE — 85027 COMPLETE CBC AUTOMATED: CPT | Performed by: OBSTETRICS & GYNECOLOGY

## 2018-07-27 PROCEDURE — 0KQM0ZZ REPAIR PERINEUM MUSCLE, OPEN APPROACH: ICD-10-PCS | Performed by: OBSTETRICS & GYNECOLOGY

## 2018-07-27 PROCEDURE — 10907ZC DRAINAGE OF AMNIOTIC FLUID, THERAPEUTIC FROM PRODUCTS OF CONCEPTION, VIA NATURAL OR ARTIFICIAL OPENING: ICD-10-PCS | Performed by: OBSTETRICS & GYNECOLOGY

## 2018-07-27 PROCEDURE — 72200001 ZZH LABOR CARE VAGINAL DELIVERY SINGLE

## 2018-07-27 PROCEDURE — 36415 COLL VENOUS BLD VENIPUNCTURE: CPT | Performed by: OBSTETRICS & GYNECOLOGY

## 2018-07-27 PROCEDURE — 86850 RBC ANTIBODY SCREEN: CPT | Performed by: OBSTETRICS & GYNECOLOGY

## 2018-07-27 PROCEDURE — 37000011 ZZH ANESTHESIA WARD SERVICE

## 2018-07-27 PROCEDURE — 86901 BLOOD TYPING SEROLOGIC RH(D): CPT | Performed by: OBSTETRICS & GYNECOLOGY

## 2018-07-27 RX ORDER — EPHEDRINE SULFATE 50 MG/ML
5 INJECTION, SOLUTION INTRAMUSCULAR; INTRAVENOUS; SUBCUTANEOUS
Status: DISCONTINUED | OUTPATIENT
Start: 2018-07-27 | End: 2018-07-27

## 2018-07-27 RX ORDER — LABETALOL HYDROCHLORIDE 5 MG/ML
80 INJECTION, SOLUTION INTRAVENOUS EVERY 10 MIN PRN
Status: DISCONTINUED | OUTPATIENT
Start: 2018-07-27 | End: 2018-07-27

## 2018-07-27 RX ORDER — MAGNESIUM SULFATE HEPTAHYDRATE 40 MG/ML
2 INJECTION, SOLUTION INTRAVENOUS
Status: DISCONTINUED | OUTPATIENT
Start: 2018-07-27 | End: 2018-07-27

## 2018-07-27 RX ORDER — MAGNESIUM SULFATE HEPTAHYDRATE 500 MG/ML
4 INJECTION, SOLUTION INTRAMUSCULAR; INTRAVENOUS
Status: DISCONTINUED | OUTPATIENT
Start: 2018-07-27 | End: 2018-07-27

## 2018-07-27 RX ORDER — OXYTOCIN/0.9 % SODIUM CHLORIDE 30/500 ML
1-24 PLASTIC BAG, INJECTION (ML) INTRAVENOUS CONTINUOUS
Status: DISCONTINUED | OUTPATIENT
Start: 2018-07-27 | End: 2018-07-27

## 2018-07-27 RX ORDER — OXYTOCIN/0.9 % SODIUM CHLORIDE 30/500 ML
100-340 PLASTIC BAG, INJECTION (ML) INTRAVENOUS CONTINUOUS PRN
Status: COMPLETED | OUTPATIENT
Start: 2018-07-27 | End: 2018-07-27

## 2018-07-27 RX ORDER — SODIUM CHLORIDE, SODIUM LACTATE, POTASSIUM CHLORIDE, CALCIUM CHLORIDE 600; 310; 30; 20 MG/100ML; MG/100ML; MG/100ML; MG/100ML
INJECTION, SOLUTION INTRAVENOUS CONTINUOUS
Status: DISCONTINUED | OUTPATIENT
Start: 2018-07-27 | End: 2018-07-27

## 2018-07-27 RX ORDER — EPHEDRINE SULFATE 50 MG/ML
INJECTION, SOLUTION INTRAMUSCULAR; INTRAVENOUS; SUBCUTANEOUS
Status: DISCONTINUED
Start: 2018-07-27 | End: 2018-07-27 | Stop reason: WASHOUT

## 2018-07-27 RX ORDER — NIFEDIPINE 10 MG/1
10 CAPSULE ORAL
Status: DISCONTINUED | OUTPATIENT
Start: 2018-07-27 | End: 2018-07-27

## 2018-07-27 RX ORDER — FENTANYL CITRATE 50 UG/ML
100 INJECTION, SOLUTION INTRAMUSCULAR; INTRAVENOUS ONCE
Status: COMPLETED | OUTPATIENT
Start: 2018-07-27 | End: 2018-07-27

## 2018-07-27 RX ORDER — CALCIUM GLUCONATE 94 MG/ML
1 INJECTION, SOLUTION INTRAVENOUS
Status: DISCONTINUED | OUTPATIENT
Start: 2018-07-27 | End: 2018-07-27

## 2018-07-27 RX ORDER — MAGNESIUM SULFATE HEPTAHYDRATE 40 MG/ML
4 INJECTION, SOLUTION INTRAVENOUS
Status: DISCONTINUED | OUTPATIENT
Start: 2018-07-27 | End: 2018-07-27

## 2018-07-27 RX ORDER — MAGNESIUM SULFATE HEPTAHYDRATE 40 MG/ML
4 INJECTION, SOLUTION INTRAVENOUS ONCE
Status: DISCONTINUED | OUTPATIENT
Start: 2018-07-27 | End: 2018-07-27

## 2018-07-27 RX ORDER — NALBUPHINE HYDROCHLORIDE 10 MG/ML
2.5-5 INJECTION, SOLUTION INTRAMUSCULAR; INTRAVENOUS; SUBCUTANEOUS EVERY 6 HOURS PRN
Status: DISCONTINUED | OUTPATIENT
Start: 2018-07-27 | End: 2018-07-27

## 2018-07-27 RX ORDER — IBUPROFEN 400 MG/1
800 TABLET, FILM COATED ORAL
Status: COMPLETED | OUTPATIENT
Start: 2018-07-27 | End: 2018-07-27

## 2018-07-27 RX ORDER — ROPIVACAINE HYDROCHLORIDE 2 MG/ML
10 INJECTION, SOLUTION EPIDURAL; INFILTRATION; PERINEURAL ONCE
Status: COMPLETED | OUTPATIENT
Start: 2018-07-27 | End: 2018-07-27

## 2018-07-27 RX ORDER — NALOXONE HYDROCHLORIDE 0.4 MG/ML
.1-.4 INJECTION, SOLUTION INTRAMUSCULAR; INTRAVENOUS; SUBCUTANEOUS
Status: DISCONTINUED | OUTPATIENT
Start: 2018-07-27 | End: 2018-07-27

## 2018-07-27 RX ORDER — BISACODYL 10 MG
10 SUPPOSITORY, RECTAL RECTAL DAILY PRN
Status: DISCONTINUED | OUTPATIENT
Start: 2018-07-29 | End: 2018-07-29 | Stop reason: HOSPADM

## 2018-07-27 RX ORDER — ROPIVACAINE HYDROCHLORIDE 2 MG/ML
INJECTION, SOLUTION EPIDURAL; INFILTRATION; PERINEURAL
Status: COMPLETED
Start: 2018-07-27 | End: 2018-07-27

## 2018-07-27 RX ORDER — OXYCODONE AND ACETAMINOPHEN 5; 325 MG/1; MG/1
1 TABLET ORAL
Status: DISCONTINUED | OUTPATIENT
Start: 2018-07-27 | End: 2018-07-27

## 2018-07-27 RX ORDER — ONDANSETRON 4 MG/1
4 TABLET, ORALLY DISINTEGRATING ORAL EVERY 6 HOURS PRN
Status: DISCONTINUED | OUTPATIENT
Start: 2018-07-27 | End: 2018-07-27

## 2018-07-27 RX ORDER — HYDROCORTISONE 2.5 %
CREAM (GRAM) TOPICAL 3 TIMES DAILY PRN
Status: DISCONTINUED | OUTPATIENT
Start: 2018-07-27 | End: 2018-07-29 | Stop reason: HOSPADM

## 2018-07-27 RX ORDER — ACETAMINOPHEN 325 MG/1
650 TABLET ORAL EVERY 4 HOURS PRN
Status: DISCONTINUED | OUTPATIENT
Start: 2018-07-27 | End: 2018-07-27

## 2018-07-27 RX ORDER — OXYTOCIN 10 [USP'U]/ML
10 INJECTION, SOLUTION INTRAMUSCULAR; INTRAVENOUS
Status: DISCONTINUED | OUTPATIENT
Start: 2018-07-27 | End: 2018-07-29 | Stop reason: HOSPADM

## 2018-07-27 RX ORDER — ONDANSETRON 2 MG/ML
4 INJECTION INTRAMUSCULAR; INTRAVENOUS EVERY 6 HOURS PRN
Status: DISCONTINUED | OUTPATIENT
Start: 2018-07-27 | End: 2018-07-27

## 2018-07-27 RX ORDER — LORAZEPAM 2 MG/ML
2 INJECTION INTRAMUSCULAR
Status: DISCONTINUED | OUTPATIENT
Start: 2018-07-27 | End: 2018-07-27

## 2018-07-27 RX ORDER — LABETALOL HYDROCHLORIDE 5 MG/ML
40 INJECTION, SOLUTION INTRAVENOUS EVERY 10 MIN PRN
Status: DISCONTINUED | OUTPATIENT
Start: 2018-07-27 | End: 2018-07-27

## 2018-07-27 RX ORDER — OXYTOCIN 10 [USP'U]/ML
10 INJECTION, SOLUTION INTRAMUSCULAR; INTRAVENOUS
Status: DISCONTINUED | OUTPATIENT
Start: 2018-07-27 | End: 2018-07-27

## 2018-07-27 RX ORDER — AMOXICILLIN 250 MG
1 CAPSULE ORAL 2 TIMES DAILY
Status: DISCONTINUED | OUTPATIENT
Start: 2018-07-27 | End: 2018-07-29 | Stop reason: HOSPADM

## 2018-07-27 RX ORDER — MISOPROSTOL 200 UG/1
400 TABLET ORAL
Status: DISCONTINUED | OUTPATIENT
Start: 2018-07-27 | End: 2018-07-29 | Stop reason: HOSPADM

## 2018-07-27 RX ORDER — NALOXONE HYDROCHLORIDE 0.4 MG/ML
.1-.4 INJECTION, SOLUTION INTRAMUSCULAR; INTRAVENOUS; SUBCUTANEOUS
Status: DISCONTINUED | OUTPATIENT
Start: 2018-07-27 | End: 2018-07-29 | Stop reason: HOSPADM

## 2018-07-27 RX ORDER — AMOXICILLIN 250 MG
2 CAPSULE ORAL 2 TIMES DAILY
Status: DISCONTINUED | OUTPATIENT
Start: 2018-07-27 | End: 2018-07-29 | Stop reason: HOSPADM

## 2018-07-27 RX ORDER — OXYTOCIN/0.9 % SODIUM CHLORIDE 30/500 ML
340 PLASTIC BAG, INJECTION (ML) INTRAVENOUS CONTINUOUS PRN
Status: DISCONTINUED | OUTPATIENT
Start: 2018-07-27 | End: 2018-07-29 | Stop reason: HOSPADM

## 2018-07-27 RX ORDER — LABETALOL HYDROCHLORIDE 5 MG/ML
20 INJECTION, SOLUTION INTRAVENOUS EVERY 10 MIN PRN
Status: DISCONTINUED | OUTPATIENT
Start: 2018-07-27 | End: 2018-07-27

## 2018-07-27 RX ORDER — HYDROCODONE BITARTRATE AND ACETAMINOPHEN 5; 325 MG/1; MG/1
1 TABLET ORAL EVERY 4 HOURS PRN
Status: DISCONTINUED | OUTPATIENT
Start: 2018-07-27 | End: 2018-07-29 | Stop reason: HOSPADM

## 2018-07-27 RX ORDER — CARBOPROST TROMETHAMINE 250 UG/ML
250 INJECTION, SOLUTION INTRAMUSCULAR
Status: DISCONTINUED | OUTPATIENT
Start: 2018-07-27 | End: 2018-07-27

## 2018-07-27 RX ORDER — IBUPROFEN 400 MG/1
800 TABLET, FILM COATED ORAL EVERY 6 HOURS PRN
Status: DISCONTINUED | OUTPATIENT
Start: 2018-07-27 | End: 2018-07-29 | Stop reason: HOSPADM

## 2018-07-27 RX ORDER — FENTANYL CITRATE 50 UG/ML
INJECTION, SOLUTION INTRAMUSCULAR; INTRAVENOUS
Status: COMPLETED
Start: 2018-07-27 | End: 2018-07-27

## 2018-07-27 RX ORDER — SODIUM CHLORIDE, SODIUM LACTATE, POTASSIUM CHLORIDE, CALCIUM CHLORIDE 600; 310; 30; 20 MG/100ML; MG/100ML; MG/100ML; MG/100ML
10-125 INJECTION, SOLUTION INTRAVENOUS CONTINUOUS
Status: DISCONTINUED | OUTPATIENT
Start: 2018-07-27 | End: 2018-07-27

## 2018-07-27 RX ORDER — LIDOCAINE 40 MG/G
CREAM TOPICAL
Status: DISCONTINUED | OUTPATIENT
Start: 2018-07-27 | End: 2018-07-27

## 2018-07-27 RX ORDER — LANOLIN 100 %
OINTMENT (GRAM) TOPICAL
Status: DISCONTINUED | OUTPATIENT
Start: 2018-07-27 | End: 2018-07-29 | Stop reason: HOSPADM

## 2018-07-27 RX ORDER — OXYTOCIN/0.9 % SODIUM CHLORIDE 30/500 ML
100 PLASTIC BAG, INJECTION (ML) INTRAVENOUS CONTINUOUS
Status: DISCONTINUED | OUTPATIENT
Start: 2018-07-27 | End: 2018-07-29 | Stop reason: HOSPADM

## 2018-07-27 RX ORDER — METHYLERGONOVINE MALEATE 0.2 MG/ML
200 INJECTION INTRAVENOUS
Status: DISCONTINUED | OUTPATIENT
Start: 2018-07-27 | End: 2018-07-27

## 2018-07-27 RX ORDER — MAGNESIUM SULFATE IN WATER 40 MG/ML
2 INJECTION, SOLUTION INTRAVENOUS CONTINUOUS
Status: DISCONTINUED | OUTPATIENT
Start: 2018-07-27 | End: 2018-07-27

## 2018-07-27 RX ORDER — ACETAMINOPHEN 325 MG/1
650 TABLET ORAL EVERY 4 HOURS PRN
Status: DISCONTINUED | OUTPATIENT
Start: 2018-07-27 | End: 2018-07-29 | Stop reason: HOSPADM

## 2018-07-27 RX ADMIN — ACETAMINOPHEN 650 MG: 325 TABLET, FILM COATED ORAL at 15:43

## 2018-07-27 RX ADMIN — SODIUM CHLORIDE, POTASSIUM CHLORIDE, SODIUM LACTATE AND CALCIUM CHLORIDE: 600; 310; 30; 20 INJECTION, SOLUTION INTRAVENOUS at 08:22

## 2018-07-27 RX ADMIN — IBUPROFEN 800 MG: 400 TABLET ORAL at 15:43

## 2018-07-27 RX ADMIN — ACETAMINOPHEN 650 MG: 325 TABLET, FILM COATED ORAL at 21:38

## 2018-07-27 RX ADMIN — SENNOSIDES AND DOCUSATE SODIUM 1 TABLET: 8.6; 5 TABLET ORAL at 21:38

## 2018-07-27 RX ADMIN — Medication 12 ML/HR: at 09:49

## 2018-07-27 RX ADMIN — SODIUM CHLORIDE, POTASSIUM CHLORIDE, SODIUM LACTATE AND CALCIUM CHLORIDE: 600; 310; 30; 20 INJECTION, SOLUTION INTRAVENOUS at 09:41

## 2018-07-27 RX ADMIN — OXYTOCIN-SODIUM CHLORIDE 0.9% IV SOLN 30 UNIT/500ML 2 MILLI-UNITS/MIN: 30-0.9/5 SOLUTION at 08:30

## 2018-07-27 RX ADMIN — IBUPROFEN 800 MG: 400 TABLET ORAL at 21:38

## 2018-07-27 RX ADMIN — OXYTOCIN-SODIUM CHLORIDE 0.9% IV SOLN 30 UNIT/500ML 340 ML/HR: 30-0.9/5 SOLUTION at 14:57

## 2018-07-27 RX ADMIN — FENTANYL CITRATE 100 MCG: 50 INJECTION, SOLUTION INTRAMUSCULAR; INTRAVENOUS at 09:31

## 2018-07-27 RX ADMIN — ROPIVACAINE HYDROCHLORIDE 10 ML: 2 INJECTION, SOLUTION EPIDURAL; INFILTRATION at 09:31

## 2018-07-27 ASSESSMENT — ACTIVITIES OF DAILY LIVING (ADL)
COGNITION: 0 - NO COGNITION ISSUES REPORTED
RETIRED_COMMUNICATION: 0-->UNDERSTANDS/COMMUNICATES WITHOUT DIFFICULTY
TRANSFERRING: 0-->INDEPENDENT
TOILETING: 0 - INDEPENDENT
RETIRED_EATING: 0-->INDEPENDENT
AMBULATION: 0-->INDEPENDENT
TRANSFERRING: 0 - INDEPENDENT
DRESS: 0-->INDEPENDENT
BATHING: 0-->INDEPENDENT
CHANGE_IN_FUNCTIONAL_STATUS_SINCE_ONSET_OF_CURRENT_ILLNESS/INJURY: NO
AMBULATION: 0 - INDEPENDENT
COMMUNICATION: 0 - UNDERSTANDS/COMMUNICATES WITHOUT DIFFICULTY
TOILETING: 0-->INDEPENDENT
EATING: 0 - INDEPENDENT
SWALLOWING: 0 - SWALLOWS FOODS/LIQUIDS WITHOUT DIFFICULTY
SWALLOWING: 0-->SWALLOWS FOODS/LIQUIDS WITHOUT DIFFICULTY
FALL_HISTORY_WITHIN_LAST_SIX_MONTHS: NO
BATHING: 0 - INDEPENDENT
DRESS: 0 - INDEPENDENT

## 2018-07-27 NOTE — H&P
Significant change in general health status.  See prenatal record and notation in the progress note.    EFW: 8lb     Patient admitted for induction due to recent onset of gestational htn   Pregnancy complicated by AMA  gbs negative  A+  38w 2 d

## 2018-07-27 NOTE — IP AVS SNAPSHOT
MRN:[id number]                      After Visit Summary   7/27/2018    Yulisa Rai    MRN: [id number]           Thank you!     Thank you for choosing [facility] for your care. Our goal is always to provide you with excellent care. Hearing back from our patients is one way we can continue to improve our services. Please take a few minutes to complete the written survey that you may receive in the mail after you visit with us. Thank you!        Patient Information     Date Of Birth          1980        Designated Caregiver       Most Recent Value    Caregiver    Will someone help with your care after discharge? no    Name of designated caregiver Juan Luis      About your hospital stay     You were admitted on:  July 27, 2018 You last received care in the:  [address]    You were discharged on:  July 29, 2018        Reason for your hospital stay       delivery                  Who to Call     For medical emergencies, please call 911.  For non-urgent questions about your medical care, please call your primary care provider or clinic, [phone]          Attending Provider     Provider Specialty    Lorraine Patino MD OB/Gyn       Primary Care Provider Office Phone # Fax #    Lorraine Patino [phone]      After Care Instructions     Activity       Your activity upon discharge: activity as tolerated and no driving for today            Diet       Follow this diet upon discharge: Regular                  Follow-up Appointments     Follow-up and recommended labs and tests        Follow up with me,  Lorraine Patino, within 6 wks                  Your next 10 appointments already scheduled     Jul 31, 2018  8:00 AM CDT   ESTABLISHED PRENATAL with Lorraine Patino MD   [facility] [place] ([facility] [place])    [address] [place] [address]   [phone]            Aug 07, 2018  8:00 AM CDT    ESTABLISHED PRENATAL with Lorraine Patino MD   [facility] [place] ([facility] [place])    [address] [address] [phone]              Further instructions from your care team       Postpartum Vaginal Delivery Instructions    Activity       Ask family and friends for help when you need it.    Do not place anything in your vagina for 6 weeks.    You are not restricted on other activities, but take it easy for a few weeks to allow your body to recover from delivery.  You are able to do any activities you feel up to that point.    No driving until you have stopped taking your pain medications (usually two weeks after delivery).     Call your health care provider if you have any of these symptoms:       Increased pain, swelling, redness, or fluid around your stiches from an episiotomy or perineal tear.    A fever above 100.4 F (38 C) with or without chills when placing a thermometer under your tongue.    You soak a sanitary pad with blood within 1 hour, or you see blood clots larger than a golf ball.    Bleeding that lasts more than 6 weeks.    Vaginal discharge that smells bad.    Severe pain, cramping or tenderness in your lower belly area.    A need to urinate more frequently (use the toilet more often), more urgently (use the toilet very quickly), or it burns when you urinate.    Nausea and vomiting.    Redness, swelling or pain around a vein in your leg.    Problems breastfeeding or a red or painful area on your breast.    Chest pain and cough or are gasping for air.    Problems coping with sadness, anxiety, or depression.  If you have any concerns about hurting yourself or the baby, call your provider immediately.     You have questions or concerns after you return home.     Keep your hands clean:  Always wash your hands before touching your perineal area and stitches.  This helps reduce your risk of infection.  If your hands aren't dirty, you  may use an alcohol hand-rub to clean your hands. Keep your nails clean and short.        Pending Results     No orders found from 7/25/2018 to 7/28/2018.            Statement of Approval     Ordered          07/29/18 0920  I have reviewed and agree with all the recommendations and orders detailed in this document.  EFFECTIVE NOW     Approved and electronically signed by:  Lorraine Patino MD             Admission Information     Date & Time Provider Department Dept. Phone    7/27/2018 Lorraine Patino MD [address] [phone]      Your Vitals Were     Blood Pressure Pulse Temperature Respirations Last Period Pulse Oximetry    130/83 90 98.5  F (36.9  C) (Oral) 16 11/01/2017 95%      MyChart Information     SWEEPiOhart gives you secure access to your electronic health record. If you see a primary care provider, you can also send messages to your care team and make appointments. If you have questions, please call your primary care clinic.  If you do not have a primary care provider, please call [phone] and they will assist you.        Care EveryWhere ID     This is your Care EveryWhere ID. This could be used by other organizations to access your [place] medical records  [id number]        Equal Access to Services     [facility] : Hadii lucy Linares, wafilipe bear, qahuita kaalmadre ferguson, shona gordon. So [facility] [phone].    ATENCIÓN: Si habla español, tiene a burkett disposición servicios gratuitos de asistencia lingüística. Llame al [phone].    We comply with applicable federal civil rights laws and [place] laws. We do not discriminate on the basis of race, color, national origin, age, disability, sex, sexual orientation, or gender identity.               Review of your medicines      CONTINUE these medicines which have NOT CHANGED        Dose / Directions    albuterol 90 MCG/ACT inhaler   Used for:  Intermittent asthma         Dose:  2 puff   Inhale 2 puffs into the lungs every 4 hours as needed for shortness of breath / dyspnea (or 20 minutes prior to excercise).   Quantity:  1 Inhaler   Refills:  prn       prenatal multivitamin plus iron 27-0.8 MG Tabs per tablet   Used for:  Supervision of high risk pregnancy in first trimester        Dose:  1 tablet   Take 1 tablet by mouth daily   Refills:  0         STOP taking     TUMS PO           VALTREX 1000 mg tablet   Generic drug:  valACYclovir                    Protect others around you: Learn how to safely use, store and throw away your medicines at www.disposemymeds.org.             Medication List: This is a list of all your medications and when to take them. Check marks below indicate your daily home schedule. Keep this list as a reference.      Medications           Morning Afternoon Evening Bedtime As Needed    albuterol 90 MCG/ACT inhaler   Inhale 2 puffs into the lungs every 4 hours as needed for shortness of breath / dyspnea (or 20 minutes prior to excercise).                                prenatal multivitamin plus iron 27-0.8 MG Tabs per tablet   Take 1 tablet by mouth daily

## 2018-07-27 NOTE — ANESTHESIA PREPROCEDURE EVALUATION
Anesthesia Evaluation     .             ROS/MED HX    ENT/Pulmonary:     (+)asthma , . .    Neurologic:       Cardiovascular:     (+) --PIH, --. : . . . :. .       METS/Exercise Tolerance:     Hematologic:         Musculoskeletal:         GI/Hepatic:         Renal/Genitourinary:         Endo:         Psychiatric:     (+) psychiatric history anxiety      Infectious Disease:         Malignancy:         Other:                                    Anesthesia Plan      History & Physical Review      ASA Status:  .  OB Epidural Asa: 2            Postoperative Care      Consents  Anesthetic plan, risks, benefits and alternatives discussed with:  Patient..                          .

## 2018-07-27 NOTE — PROGRESS NOTES
Yulisa comes to L/D for IOL. Monitors applied with consent and oriented to the birthplace. Dr Patino here for AROM . IV placed and Oxytocin induction started. MANUEL Luz RN

## 2018-07-27 NOTE — OP NOTE
Procedure Date: 2018      She is a 38-year-old white female,  4, para 0-0-3-0.  She is at 38 weeks and 2 days.  Her pregnancy was complicated by a superficial myomectomy that she had prior to pregnancy and so that was determined to be safe to labor with, advanced maternal age for which she had a normal level 2 ultrasound and a normal Innatal test.  She received Tdap during her pregnancy.  Her GBS was negative.  She passed her glucose screen.  She did not have anemia on her prenatal visit earlier this week.  She presented with the beginning onset of elevated blood pressure.  Her diastolic was 96, so a decision was made to schedule induction due to gestational hypertension.  She was admitted this morning, I ruptured her membranes.  She made good progress.  She had a short labor.  She had a spontaneous vaginal delivery of a female infant, weight and Apgars are still pending.  Cord blood was obtained.  The placenta delivered spontaneous and was felt to be intact.  Her estimated blood loss was 200 mL by measurement in the bag and she had a second-degree midline laceration that was repaired with 3-0 Vicryl without complications.  The placenta appeared to be intact and the patient and the infant were doing well at the time of my departure.         RAYMUNDO ECHAVARRIA MD             D: 2018   T: 2018   MT: DONAVAN      Name:     SALLIE GARCÍA   MRN:      [id number]        Account:        [id number]   :      1980           Procedure Date: 2018      Document: [id number]

## 2018-07-27 NOTE — PROGRESS NOTES
of viable female at 251pm   Baby's weight pending     Apgars pending    Induction for gestation htn and ama              Epidural              Second degree lac              Repair with 3-0 avani             EBL 200cc             Intact placenta    Primary OB: [facility]

## 2018-07-27 NOTE — IP AVS SNAPSHOT
[address]    [place] [address]    Phone:  [phone]                                       After Visit Summary   7/27/2018    Yulisa Rai    MRN: [id number]           After Visit Summary Signature Page     I have received my discharge instructions, and my questions have been answered. I have discussed any challenges I see with this plan with the nurse or doctor.    ..........................................................................................................................................  Patient/Patient Representative Signature      ..........................................................................................................................................  Patient Representative Print Name and Relationship to Patient    ..................................................               ................................................  Date                                            Time    ..........................................................................................................................................  Reviewed by Signature/Title    ...................................................              ..............................................  Date                                                            Time

## 2018-07-27 NOTE — ANESTHESIA PROCEDURE NOTES
Peripheral nerve/Neuraxial procedure note : epidural catheter  Pre-Procedure  Performed by AC CRAVEN  Location: OB      Pre-Anesthestic Checklist: patient identified, risks and benefits discussed, informed consent, pre-op evaluation and at physician/surgeon's request    Timeout  Correct Patient: Yes   Correct Procedure: Yes   Correct Site: Yes   Correct Laterality: N/A   Correct Position: Yes   Site Marked: N/A   .   Procedure Documentation    .    Procedure:    Epidural catheter.  Insertion Site:L3-4  (midline approach) Injection technique: LORT saline   Local skin infiltrated with 3 mL of 1% lidocaine.       Patient Prep;mask, sterile gloves, povidone-iodine 7.5% surgical scrub, patient draped.  .  Needle: Touhy needle Needle Gauge: 17.    Needle Length (Inches) 3.5  # of attempts: 1 and # of redirects:  .   Catheter: 19 G . .  Catheter threaded easily  3 cm epidural space.  .   .    Assessment/Narrative  Paresthesias: No.  .  .  Aspiration negative for heme or CSF  . Test dose of 3 mL lidocaine 1.5% w/ 1:200,000 epinephrine at. Test dose negative for signs of intravascular, subdural or intrathecal injection. Comments:  No blood or complications.  Secured with adhesive spray, tegaderm, and tape.

## 2018-07-28 LAB — HGB BLD-MCNC: 11.8 G/DL (ref 11.7–15.7)

## 2018-07-28 PROCEDURE — 25000132 ZZH RX MED GY IP 250 OP 250 PS 637: Performed by: OBSTETRICS & GYNECOLOGY

## 2018-07-28 PROCEDURE — 36415 COLL VENOUS BLD VENIPUNCTURE: CPT | Performed by: OBSTETRICS & GYNECOLOGY

## 2018-07-28 PROCEDURE — 12000037 ZZH R&B POSTPARTUM INTERMEDIATE

## 2018-07-28 PROCEDURE — 85018 HEMOGLOBIN: CPT | Performed by: OBSTETRICS & GYNECOLOGY

## 2018-07-28 RX ADMIN — SENNOSIDES AND DOCUSATE SODIUM 1 TABLET: 8.6; 5 TABLET ORAL at 10:08

## 2018-07-28 RX ADMIN — IBUPROFEN 800 MG: 400 TABLET ORAL at 16:43

## 2018-07-28 RX ADMIN — ACETAMINOPHEN 650 MG: 325 TABLET, FILM COATED ORAL at 16:43

## 2018-07-28 RX ADMIN — IBUPROFEN 800 MG: 400 TABLET ORAL at 07:40

## 2018-07-28 RX ADMIN — ACETAMINOPHEN 650 MG: 325 TABLET, FILM COATED ORAL at 07:40

## 2018-07-28 NOTE — PLAN OF CARE
Problem: Patient Care Overview  Goal: Plan of Care/Patient Progress Review  Outcome: No Change  Stable patient meeting expected goals.  Patient up in room without difficulty, denies pain and declines medication.   Able to empty bladder without difficulty.    Independent with breastfeeding, latch score of 10.  Pt asking appropriate questions and states understanding of teaching.

## 2018-07-28 NOTE — PROGRESS NOTES
S: Pt doing well. Infant is being . Pain is well controlled.    O: /78  Temp 98.3  F (36.8  C)  Resp 16  LMP 11/01/2017  SpO2 95%  Breastfeeding? Unknown        ABD:  Uterine fundus is firm, non-tender and at the level of the umbilicus                Hemoglobin   Date Value Ref Range Status   07/27/2018 12.1 11.7 - 15.7 g/dL Final            Lab Results   Component Value Date    RH Pos 07/27/2018       A:  Post-partum day #1 s/p Normal spontaneous vaginal delivery    P: Continue PP Cares

## 2018-07-28 NOTE — PLAN OF CARE
Problem: Patient Care Overview  Goal: Plan of Care/Patient Progress Review  Outcome: Improving  VSS. Fundus firm and midline. Scant flow. Pain 1/10, taking tylenol and ibuprofen. Breastfeeding. Ambulating free of dizziness or lightheaded. Voiding without difficulty. Encouraged to call with needs, questions, or concerns. Will continue to monitor.

## 2018-07-28 NOTE — ANESTHESIA POSTPROCEDURE EVALUATION
Patient: Yulisa Rai    * No procedures listed *    Diagnosis:* No pre-op diagnosis entered *  Diagnosis Additional Information: No value filed.    Anesthesia Type:  No value filed.    Note:  Anesthesia Post Evaluation    Patient location during evaluation: Bedside  Patient participation: Able to fully participate in evaluation     Anesthetic complications: None    Comments: The patient was satisfied with her labor epidural and had no complaints. She is able to ambulate and denies urinary or bowel complaints.         Last vitals:  Vitals:    07/28/18 0130 07/28/18 0740 07/28/18 1632   BP: 108/66 111/78 112/73   Resp: 16 16 16   Temp: 36.8  C (98.3  F) 36.8  C (98.3  F) 36.7  C (98  F)   SpO2:            Electronically Signed By: Surya Sorenson MD  July 28, 2018  4:45 PM

## 2018-07-28 NOTE — LACTATION NOTE
Initial visit with Yulisa. Breastfeeding general information reviewed.   Advised to breastfeed exclusively, on demand, avoid pacifiers, bottles and formula unless medically indicated.  Encouraged rooming in, skin to skin, feeding on demand 8-12x/day or sooner if baby cues.  Explained benefits of holding and skin to skin.  Encouraged lots of skin to skin. Instructed on hand expression. Baby at breast on the left side.  Answered questions about concerns over milk supply and latching baby.  She latched and suckled well soon after birth, but has been sleepy most of the time since.  Currently she is latched and suckling vigorously with audible swallowing noted.  Mom is concerned that she does not have enough milk.  We reviewed general breastfeeding information.  Explained how milk supply is established and maintained.  Showed how to position baby so that she is able to latch deeply.  Repositioned baby and nipple discomfort decreased.  Showed parents how to identify and correct a poor latch.    Continues to nurse well per mom. No further questions at this time.   Will follow as needed.   Felicitas Montoya BSN, RN, PHN, RNC-MNN, IBCLC

## 2018-07-28 NOTE — PLAN OF CARE
Problem: Patient Care Overview  Goal: Plan of Care/Patient Progress Review  Outcome: Improving  Patient to floor at 1800. Report taken from July MEJIA. VSS. Fundus firm and midline. Scant flow. Breastfeeding. Ambulating free of dizziness or lightheaded. Voiding without difficulty. Encouraged to call with needs, questions, or concerns. Will continue to monitor.

## 2018-07-29 VITALS
SYSTOLIC BLOOD PRESSURE: 130 MMHG | RESPIRATION RATE: 16 BRPM | DIASTOLIC BLOOD PRESSURE: 83 MMHG | HEART RATE: 90 BPM | OXYGEN SATURATION: 95 % | TEMPERATURE: 98.5 F

## 2018-07-29 PROCEDURE — 25000132 ZZH RX MED GY IP 250 OP 250 PS 637: Performed by: OBSTETRICS & GYNECOLOGY

## 2018-07-29 RX ADMIN — SENNOSIDES AND DOCUSATE SODIUM 1 TABLET: 8.6; 5 TABLET ORAL at 08:25

## 2018-07-29 NOTE — PROGRESS NOTES
S: Pt doing well. Infant is being . Pain is well controlled.    O: /83  Pulse 90  Temp 98.5  F (36.9  C) (Oral)  Resp 16  LMP 11/01/2017  SpO2 95%  Breastfeeding? Unknown        ABD:  Uterine fundus is firm, non-tender and at the level of the umbilicus                Hemoglobin   Date Value Ref Range Status   07/28/2018 11.8 11.7 - 15.7 g/dL Final            Lab Results   Component Value Date    RH Pos 07/27/2018       A:  Post-partum day #2 s/p Normal spontaneous vaginal delivery    P: Continue PP Cares

## 2018-07-29 NOTE — PLAN OF CARE
Problem: Patient Care Overview  Goal: Plan of Care/Patient Progress Review  Outcome: Improving  VSS. Fundus firm and midline. Scant flow. Pain 2/10, taking tylenol and ibuprofen. Breastfeeding. Ambulating free of dizziness or lightheaded. Voiding without difficulty. Encouraged to call with needs, questions, or concerns. Will continue to monitor.

## 2018-07-29 NOTE — PLAN OF CARE
Problem: Patient Care Overview  Goal: Plan of Care/Patient Progress Review  Outcome: Adequate for Discharge Date Met: 07/29/18  Patient VS WDL, denies concerns at this time and states she is agreeable to discharge.  Discharge teaching completed with patient and spouse, new beginnings book reviewed and both state understanding.

## 2018-07-29 NOTE — PLAN OF CARE
Problem: Patient Care Overview  Goal: Plan of Care/Patient Progress Review  Outcome: No Change  Stable patient meeting expected goals.  Declined medication for pain this shift.     Able to empty bladder without difficulty.  Up and ambulating in room independently.  Independent with self and  care.  Pt asking appropriate questions and states understanding of teaching.

## 2018-08-03 ENCOUNTER — DOCUMENTATION ONLY (OUTPATIENT)
Dept: OTHER | Facility: CLINIC | Age: 38
End: 2018-08-03

## 2018-09-04 ENCOUNTER — PRENATAL OFFICE VISIT (OUTPATIENT)
Dept: OBGYN | Facility: CLINIC | Age: 38
End: 2018-09-04
Payer: COMMERCIAL

## 2018-09-04 VITALS
SYSTOLIC BLOOD PRESSURE: 114 MMHG | DIASTOLIC BLOOD PRESSURE: 80 MMHG | WEIGHT: 208 LBS | BODY MASS INDEX: 32.65 KG/M2 | HEIGHT: 67 IN

## 2018-09-04 PROCEDURE — 99207 ZZC POST PARTUM EXAM: CPT | Performed by: OBSTETRICS & GYNECOLOGY

## 2018-09-04 RX ORDER — ACETAMINOPHEN AND CODEINE PHOSPHATE 120; 12 MG/5ML; MG/5ML
0.35 SOLUTION ORAL DAILY
Qty: 84 TABLET | Refills: 3 | Status: SHIPPED | OUTPATIENT
Start: 2018-09-04 | End: 2019-08-20

## 2018-09-04 ASSESSMENT — PATIENT HEALTH QUESTIONNAIRE - PHQ9: 5. POOR APPETITE OR OVEREATING: MORE THAN HALF THE DAYS

## 2018-09-04 ASSESSMENT — ANXIETY QUESTIONNAIRES
7. FEELING AFRAID AS IF SOMETHING AWFUL MIGHT HAPPEN: MORE THAN HALF THE DAYS
6. BECOMING EASILY ANNOYED OR IRRITABLE: MORE THAN HALF THE DAYS
3. WORRYING TOO MUCH ABOUT DIFFERENT THINGS: MORE THAN HALF THE DAYS
5. BEING SO RESTLESS THAT IT IS HARD TO SIT STILL: NOT AT ALL
2. NOT BEING ABLE TO STOP OR CONTROL WORRYING: MORE THAN HALF THE DAYS
IF YOU CHECKED OFF ANY PROBLEMS ON THIS QUESTIONNAIRE, HOW DIFFICULT HAVE THESE PROBLEMS MADE IT FOR YOU TO DO YOUR WORK, TAKE CARE OF THINGS AT HOME, OR GET ALONG WITH OTHER PEOPLE: SOMEWHAT DIFFICULT
1. FEELING NERVOUS, ANXIOUS, OR ON EDGE: SEVERAL DAYS
GAD7 TOTAL SCORE: 11

## 2018-09-04 NOTE — Clinical Note
Please abstract the following data from this visit with this patient into the appropriate field in Epic:  Pap smear done on this date: 4/20/16 (approximately), by this group: [facility], results were neg. HPV testing POSITIVE for high risk other.

## 2018-09-04 NOTE — PROGRESS NOTES
"  SUBJECTIVE:  Yulisa Rai,  is here for a postpartum visit.  She had a  on 18 delivering a healthy baby girl, named Antonietta weighing 7 lbs 1.2 oz at term.      HPI:  Patient is tearful  Baby isn't sleeping much  Patient is sleep deprived  Pumping and bottle feeding breast milk  Her mom helps a lot  Wants to wait a bit before considering med for pp depression  Return 2 wks if not better  Start micronor    Last PHQ-9 score on record=   PHQ-9 SCORE 2018   Total Score -   Total Score 5     BRIJESH-7 SCORE 2017 1/10/2018 2018   Total Score 2 2 11       Delivery complications:  No  Breast feeding:  She is pumping and bottle feeding.  Bladder problems: Yes, patient states she doesn't feel like her bladder is emptying when she voids, causing some leakage when she stands up.  Bowel problems/hemorrhoids:  No  Episiotomy/laceration/incision healed? Yes: no complaints  Vaginal flow:  None  [place]:  No  Contraception: oral contraceptives  Emotional adjustment: doing well, happy, tired, having some difficulties adjusting, sad and still with pain from delivery  Back to work: end of  point review of systems negative other than symptoms noted below.  Genitourinary: Night Sweats and Pelvic Pain  Psychiatric: Anxiety and Difficulty Sleeping    OBJECTIVE:  Vitals: /80  Ht 5' 7\" (1.702 m)  Wt 208 lb (94.3 kg)  LMP 2017  BMI 32.58 kg/m2  BMI= Body mass index is 32.58 kg/(m^2).  General - pleasant female in no acute distress.  Breast -  deferred  Abdomen - No incision  Pelvic - EG: normal adult female, BUS: within normal limits, Vagina: well rugated, no discharge, Cervix: no lesions or CMT, Uterus: firm, normal sized and nontender, midplane in position. Adnexae: no masses or tenderness.  Rectovaginal - deferred.    ASSESSMENT:    ICD-10-CM    1. Routine postpartum follow-up Z39.2 norethindrone (MICRONOR) 0.35 MG per tablet       PLAN:  May resume normal activities " without restrictions.  Pap smear was not done today. Last pap: 4/20/16 neg, HPV OTHER POSITIVE @ [facility] (in Care Everywhere)    Full counseling was provided, and all questions were answered.   Return to clinic 2 wks for follow up  Plan pap next visit  Prescription micronor  Discussed baby feeding and typical progression    There are no Patient Instructions on file for this visit.  Lorraine Patino MD

## 2018-09-04 NOTE — MR AVS SNAPSHOT
"              After Visit Summary   9/4/2018    Yulisa Rai    MRN: [id number]           Patient Information     Date Of Birth          1980        Visit Information        Provider Department      9/4/2018 10:30 AM Lorraine Patino MD [facility] [place]        Today's Diagnoses     Routine postpartum follow-up    -  1       Follow-ups after your visit        Who to contact     If you have questions or need follow up information about today's clinic visit or your schedule please contact [facility] [place] directly at [phone].  Normal or non-critical lab and imaging results will be communicated to you by Paper Hunterhart, letter or phone within 4 business days after the clinic has received the results. If you do not hear from us within 7 days, please contact the clinic through MyGardenSchoolt or phone. If you have a critical or abnormal lab result, we will notify you by phone as soon as possible.  Submit refill requests through [employer] or call your pharmacy and they will forward the refill request to us. Please allow 3 business days for your refill to be completed.          Additional Information About Your Visit        MyChart Information     [employer] gives you secure access to your electronic health record. If you see a primary care provider, you can also send messages to your care team and make appointments. If you have questions, please call your primary care clinic.  If you do not have a primary care provider, please call [phone] and they will assist you.        Care EveryWhere ID     This is your Care EveryWhere ID. This could be used by other organizations to access your [place] medical records  [id number]        Your Vitals Were     Height Last Period BMI (Body Mass Index)             5' 7\" (1.702 m) 11/01/2017 32.58 kg/m2          Blood Pressure from Last 3 Encounters:   09/04/18 114/80   07/29/18 130/83   07/24/18 (!) 122/96    Weight from Last 3 Encounters:   09/04/18 " 208 lb (94.3 kg)   07/24/18 228 lb (103.4 kg)   07/17/18 227 lb (103 kg)              Today, you had the following     No orders found for display         Today's Medication Changes          These changes are accurate as of 9/4/18 11:09 AM.  If you have any questions, ask your nurse or doctor.               Start taking these medicines.        Dose/Directions    norethindrone 0.35 MG per tablet   Commonly known as:  MICRONOR   Used for:  Routine postpartum follow-up   Started by:  Lorraine Patino MD        Dose:  0.35 mg   Take 1 tablet (0.35 mg) by mouth daily   Quantity:  84 tablet   Refills:  3            Where to get your medicines      These medications were sent to [facility] [id number] [place], [place] [id number] AT [facility] [address] [facility] [address]     Phone:  [phone]     norethindrone 0.35 MG per tablet                Primary Care Provider Office Phone # Fax #    Lorraine Patino [phone] [address] [facility] [address]  [facility] [address]        Equal Access to Services     ELISHA MAJOR AH: Hadii aad ku hadasho Soomaali, waaxda luqadaha, qaybta kaalmada adeegyada, shona gordon. So [facility] [phone].    ATENCIÓN: Si habla español, tiene a burkett disposición servicios gratuitos de asistencia lingüística. [facility] [phone].    We comply with applicable federal civil rights laws and [place] laws. We do not discriminate on the basis of race, color, national origin, age, disability, sex, sexual orientation, or gender identity.            Thank you!     Thank you for choosing [facility] [place] [place]  for your care. Our goal is always to provide you with excellent care. Hearing back from our patients is one way we can continue to improve our services. Please take a few minutes to complete the written survey that you may receive in the mail after your visit with us. Thank you!             Your Updated  Medication List - Protect others around you: Learn how to safely use, store and throw away your medicines at www.disposemymeds.org.          This list is accurate as of 9/4/18 11:09 AM.  Always use your most recent med list.                   Brand Name Dispense Instructions for use Diagnosis    albuterol 90 MCG/ACT inhaler     1 Inhaler    Inhale 2 puffs into the lungs every 4 hours as needed for shortness of breath / dyspnea (or 20 minutes prior to excercise).    Intermittent asthma       norethindrone 0.35 MG per tablet    MICRONOR    84 tablet    Take 1 tablet (0.35 mg) by mouth daily    Routine postpartum follow-up       prenatal multivitamin plus iron 27-0.8 MG Tabs per tablet      Take 1 tablet by mouth daily    Supervision of high risk pregnancy in first trimester

## 2018-09-06 ASSESSMENT — PATIENT HEALTH QUESTIONNAIRE - PHQ9: SUM OF ALL RESPONSES TO PHQ QUESTIONS 1-9: 5

## 2018-09-06 ASSESSMENT — ANXIETY QUESTIONNAIRES: GAD7 TOTAL SCORE: 11

## 2018-09-14 ENCOUNTER — OFFICE VISIT (OUTPATIENT)
Dept: OBGYN | Facility: CLINIC | Age: 38
End: 2018-09-14
Payer: COMMERCIAL

## 2018-09-14 VITALS
HEART RATE: 64 BPM | WEIGHT: 208 LBS | HEIGHT: 67 IN | SYSTOLIC BLOOD PRESSURE: 124 MMHG | BODY MASS INDEX: 32.65 KG/M2 | DIASTOLIC BLOOD PRESSURE: 62 MMHG

## 2018-09-14 DIAGNOSIS — Z12.4 SCREENING FOR CERVICAL CANCER: Primary | ICD-10-CM

## 2018-09-14 DIAGNOSIS — F41.9 ANXIETY: ICD-10-CM

## 2018-09-14 PROBLEM — O09.511 ENCOUNTER FOR SUPERVISION OF PRIMIGRAVIDA OF ADVANCED MATERNAL AGE IN FIRST TRIMESTER: Status: RESOLVED | Noted: 2018-01-18 | Resolved: 2018-09-14

## 2018-09-14 PROCEDURE — 87624 HPV HI-RISK TYP POOLED RSLT: CPT | Performed by: OBSTETRICS & GYNECOLOGY

## 2018-09-14 PROCEDURE — 99214 OFFICE O/P EST MOD 30 MIN: CPT | Performed by: OBSTETRICS & GYNECOLOGY

## 2018-09-14 PROCEDURE — G0145 SCR C/V CYTO,THINLAYER,RESCR: HCPCS | Performed by: OBSTETRICS & GYNECOLOGY

## 2018-09-14 RX ORDER — ALPRAZOLAM 0.5 MG
0.5 TABLET ORAL
Qty: 5 TABLET | Refills: 0 | Status: SHIPPED | OUTPATIENT
Start: 2018-09-14 | End: 2020-07-27

## 2018-09-14 RX ORDER — CITALOPRAM HYDROBROMIDE 20 MG/1
20 TABLET ORAL DAILY
Qty: 30 TABLET | Refills: 0 | Status: SHIPPED | OUTPATIENT
Start: 2018-09-14 | End: 2018-09-27

## 2018-09-14 ASSESSMENT — ANXIETY QUESTIONNAIRES
5. BEING SO RESTLESS THAT IT IS HARD TO SIT STILL: MORE THAN HALF THE DAYS
2. NOT BEING ABLE TO STOP OR CONTROL WORRYING: NEARLY EVERY DAY
7. FEELING AFRAID AS IF SOMETHING AWFUL MIGHT HAPPEN: NEARLY EVERY DAY
GAD7 TOTAL SCORE: 19
IF YOU CHECKED OFF ANY PROBLEMS ON THIS QUESTIONNAIRE, HOW DIFFICULT HAVE THESE PROBLEMS MADE IT FOR YOU TO DO YOUR WORK, TAKE CARE OF THINGS AT HOME, OR GET ALONG WITH OTHER PEOPLE: EXTREMELY DIFFICULT
1. FEELING NERVOUS, ANXIOUS, OR ON EDGE: NEARLY EVERY DAY
3. WORRYING TOO MUCH ABOUT DIFFERENT THINGS: NEARLY EVERY DAY
6. BECOMING EASILY ANNOYED OR IRRITABLE: MORE THAN HALF THE DAYS

## 2018-09-14 ASSESSMENT — PATIENT HEALTH QUESTIONNAIRE - PHQ9: 5. POOR APPETITE OR OVEREATING: NEARLY EVERY DAY

## 2018-09-14 NOTE — PROGRESS NOTES
SUBJECTIVE:                                                   Yulisa Rai is a 38 year old female who presents to clinic today for the following health issue(s):  Patient presents with:  Anxiety: c/o anxiety, also needing pap smear & HPV done      HPI:  Delivered . Had normal PP visit. Now has increasing anxiety about baby and getting enough sleep. Can't sleep with the pressure of needing to sleep during a specific time period. Has supportive  and mother. She is nursing and pumping and has extra milk.  Tried benadryl without much success.  Anxiety about things like SIDS.   Hx of anxiety in past. Took celexa.     Patient's last menstrual period was 2017..   Patient is sexually active, .  Using oral contraceptives for contraception.    reports that she has never smoked. She has never used smokeless tobacco.    STD testing offered?  Declined    Health maintenance updated:  yes    Today's PHQ-9 Score:   PHQ-9 SCORE 2018   Total Score -   Total Score 7     Today's BRIJESH-7 Score:   BRIJESH-7 SCORE 2018   Total Score 19       Problem list and histories reviewed & adjusted, as indicated.  Additional history: as documented.    Patient Active Problem List   Diagnosis     Papanicolaou smear of cervix with atypical squamous cells of undetermined significance (ASC-US)     Personal history of other musculoskeletal disorders     Herpes simplex virus (HSV) infection     Cyst of Bartholin's gland     Papanicolaou smear of cervix with low grade squamous intraepithelial lesion (LGSIL)     Bunions     CARDIOVASCULAR SCREENING; LDL GOAL LESS THAN 160     Generalized anxiety disorder     Intermittent asthma     Encounter for supervision of primigravida of advanced maternal age in first trimester     Indication for care in labor or delivery     Past Surgical History:   Procedure Laterality Date     C NONSPECIFIC PROCEDURE      wisdom teeth extraction     C NONSPECIFIC PROCEDURE      rt. wrist fx  "    C NONSPECIFIC PROCEDURE  1/03, 3/07    ASCUS, lgsil     C NONSPECIFIC PROCEDURE  3/03, 3/07    colposcopy     D & C  2016, 2017     GYN SURGERY  07/2017    ibroid removed     HEAD & NECK SURGERY      wisdom teeth     HYSTEROSCOPY, ABLATE ENDOMETRIUM VERSAPOINT , COMBINED N/A 7/20/2017    Procedure: COMBINED HYSTEROSCOPY, ABLATE ENDOMETRIUM VERSAPOINT;  HYSTEROSCOPY WITH VERSA POINT MYOMECTOMY x 1, MULTIPLE POLYPECTOMIES ; LAPAROSCOPY FOR MYOMECTOMY X2 (<5cm);  Surgeon: Josef Masters MD;  Location: [facility]     LAPAROSCOPIC MYOMECTOMY UTERUS  7/20/2017    Procedure: LAPAROSCOPIC MYOMECTOMY UTERUS;;  Surgeon: Josef Masters MD;  Location: [facility]      Social History   Substance Use Topics     Smoking status: Never Smoker     Smokeless tobacco: Never Used     Alcohol use No      Comment: none since pg      Problem (# of Occurrences) Relation (Name,Age of Onset)    C.A.D. (2) Paternal Grandmother, Paternal Grandfather    Hypertension (2) Mother, Father    Lipids (2) Mother, Father            Current Outpatient Prescriptions   Medication Sig     albuterol 90 MCG/ACT inhaler Inhale 2 puffs into the lungs every 4 hours as needed for shortness of breath / dyspnea (or 20 minutes prior to excercise).     norethindrone (MICRONOR) 0.35 MG per tablet Take 1 tablet (0.35 mg) by mouth daily     Prenatal Vit-Fe Fumarate-FA (PRENATAL MULTIVITAMIN PLUS IRON) 27-0.8 MG TABS per tablet Take 1 tablet by mouth daily     No current facility-administered medications for this visit.      No Known Allergies    ROS:  12 point review of systems negative other than symptoms noted below.  Genitourinary: Night Sweats  Psychiatric: Anxiety, Depression, Difficulty Sleeping and Araya    OBJECTIVE:     /62  Pulse 64  Ht 5' 7\" (1.702 m)  Wt 208 lb (94.3 kg)  LMP 11/01/2017  BMI 32.58 kg/m2  Body mass index is 32.58 kg/(m^2).    Exam:  Constitutional:  Appearance: Well nourished, well developed alert, in no acute " distress  Neurologic/Psychiatric:  Mental Status:  Oriented X3   Pelvic Exam:  External Genitalia:     Normal appearance for age, no discharge present, no tenderness present, no inflammatory lesions present, color normal  Vagina:     Normal vaginal vault without central or paravaginal defects, no discharge present, no inflammatory lesions present, no masses present  Bladder:     Nontender to palpation  Urethra:   Urethral Body:  Urethra palpation normal, urethra structural support normal   Urethral Meatus:  No erythema or lesions present  Cervix:     Appearance healthy, no lesions present, nontender to palpation, no bleeding present  Anus:     Anus within normal limits, no hemorrhoids present    Pubic Hair:     Normal pubic hair distribution for age  Genitalia and Groin:     No rashes present, no lesions present, no areas of discoloration, no masses present       In-Clinic Test Results:  No results found for this or any previous visit (from the past 24 hour(s)).    ASSESSMENT/PLAN:                                                        ICD-10-CM    1. Screening for cervical cancer Z12.4 Pap imaged thin layer screen with HPV - recommended age 30 - 65     HPV High Risk Types DNA Cervical         Discussed anxiety, insomnia. It would help if she got better sleep.  Needs ongoing Rx for anxiety. Will start celexa 20mg and RTC in 30 days.   Will take xanax 0.5mg to sleep. Will pump and dump after xanax.   RTC in 30 days for follow-up.   here for visit. Feels if she had better sleep things would improve. Feels weekends are better when he's home.  20 minutes was spent face to face with the patient today discussing her history, diagnosis, and follow-up plan as noted above. Over 50% of the visit was spent in counseling and coordination of care.    Total Visit Time: 20 minutes.       Moisés Starr MD  [facility]

## 2018-09-14 NOTE — LETTER
September 21, 2018    Yulisa Rai  [address] [facility] [address]    Dear Yulisa,  We are happy to inform you that your PAP smear result from 9/14/18 is normal.  We are now able to do a follow up test on PAP smears. The DNA test is for HPV (Human Papilloma Virus). Cervical cancer is closely linked with certain types of HPV. Your results showed no evidence of high risk HPV.  Therefore we recommend you return in 3 years for your next pap smear and HPV test.  You will still need to return to the clinic every year for an annual exam and other preventive tests.  Please contact the clinic at [phone] with any questions.  Sincerely,    Moisés Starr MD/  Ivory Nava, RN, BSN  Pap Tracking

## 2018-09-14 NOTE — MR AVS SNAPSHOT
"              After Visit Summary   9/14/2018    Yulisa Rai    MRN: [id number]           Patient Information     Date Of Birth          1980        Visit Information        Provider Department      9/14/2018 1:50 PM Moisés Starr MD [facility]        Today's Diagnoses     Screening for cervical cancer    -  1    Anxiety           Follow-ups after your visit        Who to contact     If you have questions or need follow up information about today's clinic visit or your schedule please contact [facility] directly at [phone].  Normal or non-critical lab and imaging results will be communicated to you by [employer], letter or phone within 4 business days after the clinic has received the results. If you do not hear from us within 7 days, please contact the clinic through [employer] or phone. If you have a critical or abnormal lab result, we will notify you by phone as soon as possible.  Submit refill requests through [employer] or call your pharmacy and they will forward the refill request to us. Please allow 3 business days for your refill to be completed.          Additional Information About Your Visit        MyChart Information     [employer] gives you secure access to your electronic health record. If you see a primary care provider, you can also send messages to your care team and make appointments. If you have questions, please call your primary care clinic.  If you do not have a primary care provider, please call [phone] and they will assist you.        Care EveryWhere ID     This is your Care EveryWhere ID. This could be used by other organizations to access your [facility] medical records  [id number]        Your Vitals Were     Pulse Height Last Period BMI (Body Mass Index)          64 5' 7\" (1.702 m) 11/01/2017 32.58 kg/m2         Blood Pressure from Last 3 Encounters:   09/14/18 124/62   09/04/18 114/80   07/29/18 130/83    Weight from Last 3 " Encounters:   09/14/18 208 lb (94.3 kg)   09/04/18 208 lb (94.3 kg)   07/24/18 228 lb (103.4 kg)              We Performed the Following     HPV High Risk Types DNA Cervical     Pap imaged thin layer screen with HPV - recommended age 30 - 65          Today's Medication Changes          These changes are accurate as of 9/14/18  2:59 PM.  If you have any questions, ask your nurse or doctor.               Start taking these medicines.        Dose/Directions    ALPRAZolam 0.5 MG tablet   Commonly known as:  XANAX   Used for:  Anxiety   Started by:  Moisés Starr MD        Dose:  0.5 mg   Take 1 tablet (0.5 mg) by mouth nightly as needed   Quantity:  5 tablet   Refills:  0       citalopram 20 MG tablet   Commonly known as:  celeXA   Used for:  Anxiety   Started by:  Moisés Starr MD        Dose:  20 mg   Take 1 tablet (20 mg) by mouth daily   Quantity:  30 tablet   Refills:  0            Where to get your medicines      These medications were sent to [facility] [address] [place], [place] [id number] AT [facility] [address] [facility] [address]     Phone:  [phone]     citalopram 20 MG tablet         Some of these will need a paper prescription and others can be bought over the counter.  Ask your nurse if you have questions.     Bring a paper prescription for each of these medications     ALPRAZolam 0.5 MG tablet                Primary Care Provider Office Phone # Fax #    Lorraine Patino [phone] [address] [address]        Equal Access to Services     [facility] CenterJEYSON AH: Hadii aad ku hadasho Soomaali, waaxda luqadaha, qaybta kaalmada adeegthor, shona gordon. So [facility] [phone].    ATENCIÓN: Si habla español, tiene a burkett disposición servicios gratuitos de asistencia lingüística. Llame al [phone].    We comply with applicable federal civil rights laws and [place] laws. We do not  discriminate on the basis of race, color, national origin, age, disability, sex, sexual orientation, or gender identity.            Thank you!     Thank you for choosing [facility]  for your care. Our goal is always to provide you with excellent care. Hearing back from our patients is one way we can continue to improve our services. Please take a few minutes to complete the written survey that you may receive in the mail after your visit with us. Thank you!             Your Updated Medication List - Protect others around you: Learn how to safely use, store and throw away your medicines at www.disposemymeds.org.          This list is accurate as of 9/14/18  2:59 PM.  Always use your most recent med list.                   Brand Name Dispense Instructions for use Diagnosis    albuterol 90 MCG/ACT inhaler     1 Inhaler    Inhale 2 puffs into the lungs every 4 hours as needed for shortness of breath / dyspnea (or 20 minutes prior to excercise).    Intermittent asthma       ALPRAZolam 0.5 MG tablet    XANAX    5 tablet    Take 1 tablet (0.5 mg) by mouth nightly as needed    Anxiety       citalopram 20 MG tablet    celeXA    30 tablet    Take 1 tablet (20 mg) by mouth daily    Anxiety       norethindrone 0.35 MG per tablet    MICRONOR    84 tablet    Take 1 tablet (0.35 mg) by mouth daily    Routine postpartum follow-up       prenatal multivitamin plus iron 27-0.8 MG Tabs per tablet      Take 1 tablet by mouth daily    Supervision of high risk pregnancy in first trimester

## 2018-09-15 ASSESSMENT — PATIENT HEALTH QUESTIONNAIRE - PHQ9: SUM OF ALL RESPONSES TO PHQ QUESTIONS 1-9: 7

## 2018-09-15 ASSESSMENT — ANXIETY QUESTIONNAIRES: GAD7 TOTAL SCORE: 19

## 2018-09-17 ENCOUNTER — NURSE TRIAGE (OUTPATIENT)
Dept: NURSING | Facility: CLINIC | Age: 38
End: 2018-09-17

## 2018-09-17 ENCOUNTER — HOSPITAL ENCOUNTER (EMERGENCY)
Facility: CLINIC | Age: 38
Discharge: HOME | End: 2018-09-17
Attending: EMERGENCY MEDICINE | Admitting: EMERGENCY MEDICINE
Payer: COMMERCIAL

## 2018-09-17 VITALS
TEMPERATURE: 97.5 F | WEIGHT: 205 LBS | OXYGEN SATURATION: 98 % | BODY MASS INDEX: 32.18 KG/M2 | DIASTOLIC BLOOD PRESSURE: 98 MMHG | SYSTOLIC BLOOD PRESSURE: 142 MMHG | RESPIRATION RATE: 16 BRPM | HEIGHT: 67 IN

## 2018-09-17 DIAGNOSIS — F41.8 POSTPARTUM ANXIETY: ICD-10-CM

## 2018-09-17 PROCEDURE — 99282 EMERGENCY DEPT VISIT SF MDM: CPT

## 2018-09-17 RX ORDER — ZOLPIDEM TARTRATE 12.5 MG/1
12.5 TABLET, FILM COATED, EXTENDED RELEASE ORAL
Qty: 7 TABLET | Refills: 0 | Status: SHIPPED | OUTPATIENT
Start: 2018-09-17 | End: 2018-09-24

## 2018-09-17 RX ORDER — CITALOPRAM HYDROBROMIDE 20 MG/1
TABLET ORAL
Qty: 90 TABLET | Refills: 0 | OUTPATIENT
Start: 2018-09-17

## 2018-09-17 ASSESSMENT — ENCOUNTER SYMPTOMS
NERVOUS/ANXIOUS: 1
SLEEP DISTURBANCE: 1

## 2018-09-17 NOTE — TELEPHONE ENCOUNTER
rx denied. The medication was filled on 9/14, and she has been instructed to return to clinic in 30 days to assess.      Ivory Ohara RN

## 2018-09-17 NOTE — ED AVS SNAPSHOT
Emergency Department    [address] [facility] [address]    Phone:  [phone]    Fax:  [phone]                                       Yulisa Rai   MRN: [id number]    Department:   Emergency Department   Date of Visit:  9/17/2018           Patient Information     Date Of Birth          1980        Your diagnoses for this visit were:     Postpartum anxiety        You were seen by Gem Franz MD.      Follow-up Information     Follow up with Lorraine Patino MD. Schedule an appointment as soon as possible for a visit in 1 day.    Specialty:  OB/Gyn    Contact information:    [address] [facility] [address]  [facility] [address] [phone]          Discharge Instructions       Pump and dump tonight, take the Ambien at bedtime.  If you wake up to pump, okay to take 2 Benadryl tablets.  Okay to breast-feed tomorrow.  Recommend you follow-up in the clinic tomorrow with your doctor to see how it goes tonight and for further plans in the future.  Continue the Netseer        24 Hour Appointment Hotline       To make an appointment at any [facility], call [phone] ([phone]). If you don't have a family doctor or clinic, we will help you find one. [facility] are conveniently located to serve the needs of you and your family.             Review of your medicines      START taking        Dose / Directions Last dose taken    zolpidem 12.5 MG CR tablet   Commonly known as:  AMBIEN CR   Dose:  12.5 mg   Quantity:  7 tablet        Take 1 tablet (12.5 mg) by mouth nightly as needed for sleep   Refills:  0          Our records show that you are taking the medicines listed below. If these are incorrect, please call your family doctor or clinic.        Dose / Directions Last dose taken    albuterol 90 MCG/ACT inhaler   Dose:  2 puff   Quantity:  1 Inhaler        Inhale 2 puffs into the lungs every 4 hours as needed for shortness of breath / dyspnea (or 20 minutes prior  to excercise).   Refills:  prn        ALPRAZolam 0.5 MG tablet   Commonly known as:  XANAX   Dose:  0.5 mg   Quantity:  5 tablet        Take 1 tablet (0.5 mg) by mouth nightly as needed   Refills:  0        citalopram 20 MG tablet   Commonly known as:  celeXA   Dose:  20 mg   Quantity:  30 tablet        Take 1 tablet (20 mg) by mouth daily   Refills:  0        norethindrone 0.35 MG per tablet   Commonly known as:  MICRONOR   Dose:  0.35 mg   Quantity:  84 tablet        Take 1 tablet (0.35 mg) by mouth daily   Refills:  3        prenatal multivitamin plus iron 27-0.8 MG Tabs per tablet   Dose:  1 tablet        Take 1 tablet by mouth daily   Refills:  0                Prescriptions were sent or printed at these locations (1 Prescription)                   Other Prescriptions                Printed at Department/Unit printer (1 of 1)         zolpidem (AMBIEN CR) 12.5 MG CR tablet                Orders Needing Specimen Collection     None      Pending Results     No orders found from 9/15/2018 to 9/18/2018.            Pending Culture Results     No orders found from 9/15/2018 to 9/18/2018.            Pending Results Instructions     If you had any lab results that were not finalized at the time of your Discharge, you can call the ED Lab Result RN at [phone]. You will be contacted by this team for any positive Lab results or changes in treatment. The nurses are available 7 days a week from 10A to 6:30P.  You can leave a message 24 hours per day and they will return your call.        Test Results From Your Hospital Stay               Clinical Quality Measure: Blood Pressure Screening     Your blood pressure was checked while you were in the emergency department today. The last reading we obtained was  BP: (!) 142/98 . Please read the guidelines below about what these numbers mean and what you should do about them.  If your systolic blood pressure (the top number) is less than 120 and your diastolic blood pressure  (the bottom number) is less than 80, then your blood pressure is normal. There is nothing more that you need to do about it.  If your systolic blood pressure (the top number) is 120-139 or your diastolic blood pressure (the bottom number) is 80-89, your blood pressure may be higher than it should be. You should have your blood pressure rechecked within a year by a primary care provider.  If your systolic blood pressure (the top number) is 140 or greater or your diastolic blood pressure (the bottom number) is 90 or greater, you may have high blood pressure. High blood pressure is treatable, but if left untreated over time it can put you at risk for heart attack, stroke, or kidney failure. You should have your blood pressure rechecked by a primary care provider within the next 4 weeks.  If your provider in the emergency department today gave you specific instructions to follow-up with your doctor or provider even sooner than that, you should follow that instruction and not wait for up to 4 weeks for your follow-up visit.        Thank you for choosing [facility]       Thank you for choosing [facility] for your care. Our goal is always to provide you with excellent care. Hearing back from our patients is one way we can continue to improve our services. Please take a few minutes to complete the written survey that you may receive in the mail after you visit with us. Thank you!        [employer] Information     [employer] gives you secure access to your electronic health record. If you see a primary care provider, you can also send messages to your care team and make appointments. If you have questions, please call your primary care clinic.  If you do not have a primary care provider, please call [phone] and they will assist you.        Care EveryWhere ID     This is your Care EveryWhere ID. This could be used by other organizations to access your [facility] medical records  [id number]        Equal Access to Services     AYDEE  PEDRITO : [place] lucy Linares, wachuckda luqadaha, qaybta kajose luis ferguson, shona gordon. So [facility] [phone].    ATENCIÓN: Si habla español, tiene a burkett disposición servicios gratuitos de asistencia lingüística. Llame al [phone].    We comply with applicable federal civil rights laws and [place] laws. We do not discriminate on the basis of race, color, national origin, age, disability, sex, sexual orientation, or gender identity.            After Visit Summary       This is your record. Keep this with you and show to your community pharmacist(s) and doctor(s) at your next visit.

## 2018-09-17 NOTE — ED AVS SNAPSHOT
Emergency Department    [address] [facility] [address]    Phone:  [phone]    Fax:  [phone]                                       Yulisa Rai   MRN: [id number]    Department:   Emergency Department   Date of Visit:  9/17/2018           After Visit Summary Signature Page     I have received my discharge instructions, and my questions have been answered. I have discussed any challenges I see with this plan with the nurse or doctor.    ..........................................................................................................................................  Patient/Patient Representative Signature      ..........................................................................................................................................  Patient Representative Print Name and Relationship to Patient    ..................................................               ................................................  Date                                   Time    ..........................................................................................................................................  Reviewed by Signature/Title    ...................................................              ..............................................  Date                                               Time          22EPIC Rev 08/18

## 2018-09-18 ENCOUNTER — OFFICE VISIT (OUTPATIENT)
Dept: OBGYN | Facility: CLINIC | Age: 38
End: 2018-09-18
Payer: COMMERCIAL

## 2018-09-18 ENCOUNTER — NURSE TRIAGE (OUTPATIENT)
Dept: NURSING | Facility: CLINIC | Age: 38
End: 2018-09-18

## 2018-09-18 VITALS — WEIGHT: 203.8 LBS | SYSTOLIC BLOOD PRESSURE: 110 MMHG | DIASTOLIC BLOOD PRESSURE: 70 MMHG | BODY MASS INDEX: 31.92 KG/M2

## 2018-09-18 DIAGNOSIS — F51.02 ADJUSTMENT INSOMNIA: ICD-10-CM

## 2018-09-18 DIAGNOSIS — F41.9 ANXIETY: Primary | ICD-10-CM

## 2018-09-18 LAB
COPATH REPORT: NORMAL
PAP: NORMAL

## 2018-09-18 PROCEDURE — 99213 OFFICE O/P EST LOW 20 MIN: CPT | Performed by: OBSTETRICS & GYNECOLOGY

## 2018-09-18 ASSESSMENT — ANXIETY QUESTIONNAIRES
GAD7 TOTAL SCORE: 21
6. BECOMING EASILY ANNOYED OR IRRITABLE: NEARLY EVERY DAY
7. FEELING AFRAID AS IF SOMETHING AWFUL MIGHT HAPPEN: NEARLY EVERY DAY
IF YOU CHECKED OFF ANY PROBLEMS ON THIS QUESTIONNAIRE, HOW DIFFICULT HAVE THESE PROBLEMS MADE IT FOR YOU TO DO YOUR WORK, TAKE CARE OF THINGS AT HOME, OR GET ALONG WITH OTHER PEOPLE: EXTREMELY DIFFICULT
2. NOT BEING ABLE TO STOP OR CONTROL WORRYING: NEARLY EVERY DAY
1. FEELING NERVOUS, ANXIOUS, OR ON EDGE: NEARLY EVERY DAY
3. WORRYING TOO MUCH ABOUT DIFFERENT THINGS: NEARLY EVERY DAY
5. BEING SO RESTLESS THAT IT IS HARD TO SIT STILL: NEARLY EVERY DAY

## 2018-09-18 ASSESSMENT — PATIENT HEALTH QUESTIONNAIRE - PHQ9: 5. POOR APPETITE OR OVEREATING: NEARLY EVERY DAY

## 2018-09-18 NOTE — ED PROVIDER NOTES
History     Chief Complaint:  Anxiety and insomnia    HPI   Yulisa Rai is a 38 year old female who presents with her sister for evaluation of post partum anxiety. The patient reports that she has had this post partum anxiety for about 7 weeks, the age of her child. She reports that this was her first child and since having the child, she has found it more difficult to sleep, has been very anxious including symptoms of palpitations and sweatiness. The patient notes that she has a very supportive spouse who takes the child away but she is still unable to sleep. The patient notes that she has been taking Xanax 50 mg once before bed for the last 2 days and during this time, she notes that she has not been breast feeding her child and instead has been pumping and dumping while formula feeding the child. The patient notes that she is in close contact with her PCP who is aware of her condition and is the one who prescribed the Xanax. She saw her PCP last Friday, today being Monday.  The patient denies any depression. She is not suicidal.    Allergies:  No Known Allergies     Medications:    Albuterol inhaler  Xanax 0.5mg   Celexa 20 mg tablet   Micronor 0.35 mg      Past Medical History:    Bunions   Cyst of Bartholin's gland   BRIJESH (generalized anxiety disorder)   Herpes simplex without mention of complication   Hx musculoskeletal dis NEC   Intermittent asthma   Papanicolaou smear of cervix with atypical squamous cells of undetermined significance (ASC-US)   Papanicolaou smear of cervix with low grade squamous intraepithelial lesion (LGSIL)     Past Surgical History:    [place] teeth extraction  Right wrist fracture  ASCUS LGSIL  Colonoscopy  D & C  Laparoscopic myomectomy uterus    Family History:    Lipids  hypertension    Social History:  The patient  reports that she has never smoked. She has never used smokeless tobacco. She reports that she does not drink alcohol or use illicit drugs.   Marital Status:   " [2]     Review of Systems   Psychiatric/Behavioral: Positive for sleep disturbance. Negative for suicidal ideas. The patient is nervous/anxious.    All other systems reviewed and are negative.    Physical Exam   First Vitals:  BP: (!) 142/98  Heart Rate: 90  Temp: 97.5  F (36.4  C)  Resp: 16  Height: 170.2 cm (5' 7\")  Weight: 93 kg (205 lb)  SpO2: 98 %      Physical Exam  Nursing note and vitals reviewed.    Constitutional:  Appears well-developed and well-nourished, somewhat anxious and teary-eyed at times.   HENT:    Nose normal.  No discharge.      Oropharynx is clear and moist.  Eyes:    Conjunctivae are normal without injection. No lid droop.  Cardiovascular:  Normal rate, regular rhythm with normal S1 and S2.      Normal heart sounds and peripheral pulses 2+ and equal.       No murmur or randy.  Pulmonary:  Effort normal and breath sounds clear to auscultation bilaterally   No respiratory distress.  No stridor.     No wheezes. No rales.     Neurological:   Alert and oriented. No cranial nerve deficit, no facial droop.     Exhibits good muscle tone. Coordination normal.      GCS eye subscore is 4. GCS verbal subscore is 5.      GCS motor subscore is 6.   Skin:    Skin is warm and dry. No rash noted. No diaphoresis.      No erythema. No pallor.  No lesions.  Psychiatric:   Behavior is normal.  Anxious and teary-eyed.  Good eye contact.     Judgment and thought content normal.         Emergency Department Course   Emergency Department Course:  Past medical records, nursing notes, and vitals reviewed.  2210: I performed an exam of the patient as documented above.    Clinical findings and plan explained to the Patient. Patient discharged home with instructions regarding supportive care, medications, and reasons to return as well as the importance of close follow-up were reviewed.    Impression & Plan    Medical Decision Making:  Patient comes in with postpartum anxiety and not able to sleep.  She is 7 " weeks postpartum and still breast-feeding.  Xanax has not really helped her the last 3 nights.  Had a long talk with her, she is not suicidal.  She started on Celexa 3 days ago as well.  She is thinking about backing off on breast-feeding to only 3-4 times a day and I think this may be a good plan for her.  I looked up Ambien and it is compatible with breast-feeding.  I will give her Ambien CR to try at night tonight and for a prescription for a week.  I want her to see her OB doctor in the next 1-2 days to see how she is doing.  She is very happy with this plan and hopefully will get some good sleep which will help her to deal with her anxiety.  I told her the Ambien is very short-term and only to help her get caught up with her sleep and is not a long-term solution.  She understands.    Diagnosis:    ICD-10-CM    1. Postpartum anxiety O99.345     F41.1        Disposition:  discharged to home. Pump and dump tonight, take the Ambien at bedtime.  If you wake up to pump, okay to take 2 Benadryl tablets.  Okay to breast-feed tomorrow.  Recommend you follow-up in the clinic tomorrow with your doctor to see how it goes tonight and for further plans in the future.  Continue the Celexa.    Discharge Medications:  Discharge Medication List as of 9/17/2018 10:49 PM      START taking these medications    Details   zolpidem (AMBIEN CR) 12.5 MG CR tablet Take 1 tablet (12.5 mg) by mouth nightly as needed for sleep, Disp-7 tablet, R-0, Local Print           King DRAPER, am serving as a scribe at 10:10 PM on 9/17/2018 to document services personally performed by Gem Franz MD based on my observations and the provider's statements to me.     EMERGENCY DEPARTMENT       Gem Franz MD  09/17/18 [id number]

## 2018-09-18 NOTE — TELEPHONE ENCOUNTER
"  Reason for Disposition    Patient sounds very upset or troubled to the triager     \"I have post partum depression (see epic). I saw my OB on 9/14 and was given ALPRAZolam (XANAX) 0.5 MG tablet 5 tablet 0 9/14/2018  --  Sig - Route: Take 1 tablet (0.5 mg) by mouth nightly as needed - Oral    And citalopram (CELEXA) 20 MG tablet 30 tablet 0 9/14/2018  --  Sig - Route: Take 1 tablet (20 mg) by mouth daily - Oral    But the Xanax I am afraid to be taking and breast feed, so I've been pumping and dumping. The Celexa will take time to work. Tonight I can't stop crying. I can't control me nervous system, I am having heart palpitations.\" Denies other sx or thoughts. Advised ER, and to also follow up with OB in am 9/18.Call back if needed.    Additional Information    Negative: Questions or concerns about suicide thoughts, threats and attempts    Negative: Thinking of hurting the baby    Negative: Bizarre or confused behavior    Negative: [1] Depression AND [2] unable to do any of normal activities (e.g., self care, care of baby).    Negative: Fever > 100.4 F (38.0 C)    Negative: Symptoms interfere with work, school, or care of baby    Negative: Sometimes has thoughts of suicide    Negative: Known or suspected alcohol or drug abuse    Negative: History of manic-depression (bipolar disorder)    Negative: Not interested in caring for baby OR fearful of being left alone with baby    Negative: Requesting to talk with a counselor (mental health worker, psychiatrist, etc.)    Protocols used: POSTPARTUM - DEPRESSION-ADULT-    "

## 2018-09-18 NOTE — DISCHARGE INSTRUCTIONS
Pump and dump tonight, take the Ambien at bedtime.  If you wake up to pump, okay to take 2 Benadryl tablets.  Okay to breast-feed tomorrow.  Recommend you follow-up in the clinic tomorrow with your doctor to see how it goes tonight and for further plans in the future.  Continue the Celexa.

## 2018-09-18 NOTE — MR AVS SNAPSHOT
After Visit Summary   9/18/2018    Yulisa Rai    MRN: [id number]           Patient Information     Date Of Birth          1980        Visit Information        Provider Department      9/18/2018 11:30 AM Moisés Starr MD [facility]        Today's Diagnoses     Anxiety    -  1    Adjustment insomnia           Follow-ups after your visit        Who to contact     If you have questions or need follow up information about today's clinic visit or your schedule please contact [facility] directly at [phone].  Normal or non-critical lab and imaging results will be communicated to you by SelSaharahart, letter or phone within 4 business days after the clinic has received the results. If you do not hear from us within 7 days, please contact the clinic through Coopkanicst or phone. If you have a critical or abnormal lab result, we will notify you by phone as soon as possible.  Submit refill requests through Chiral Quest or call your pharmacy and they will forward the refill request to us. Please allow 3 business days for your refill to be completed.          Additional Information About Your Visit        MyChart Information     [employer] Quest gives you secure access to your electronic health record. If you see a primary care provider, you can also send messages to your care team and make appointments. If you have questions, please call your primary care clinic.  If you do not have a primary care provider, please call [phone] and they will assist you.        Care EveryWhere ID     This is your Care EveryWhere ID. This could be used by other organizations to access your [place] medical records  [id number]        Your Vitals Were     BMI (Body Mass Index)                   31.92 kg/m2            Blood Pressure from Last 3 Encounters:   09/18/18 110/70   09/17/18 (!) 142/98   09/14/18 124/62    Weight from Last 3 Encounters:   09/18/18 203 lb 12.8 oz (92.4 kg)    09/17/18 205 lb (93 kg)   09/14/18 208 lb (94.3 kg)              Today, you had the following     No orders found for display       Primary Care Provider Office Phone # Fax #    Lorraine Patino [phone] [address] [place] [address]        Equal Access to Services     [facility]: Hadii aad ku hadasho Soomaali, waaxda luqadaha, qaybta kaalmada adeegyada, waxay idiin hayaan adeeg khfedesh la'aan . So [facility] [phone].    ATENCIÓN: Si habla español, tiene a burkett disposición servicios gratuitos de asistencia lingüística. Naveen al [phone].    We comply with applicable federal civil rights laws and [place] laws. We do not discriminate on the basis of race, color, national origin, age, disability, sex, sexual orientation, or gender identity.            Thank you!     Thank you for choosing [facility] FOR [facility]  for your care. Our goal is always to provide you with excellent care. Hearing back from our patients is one way we can continue to improve our services. Please take a few minutes to complete the written survey that you may receive in the mail after your visit with us. Thank you!             Your Updated Medication List - Protect others around you: Learn how to safely use, store and throw away your medicines at www.disposemymeds.org.          This list is accurate as of 9/18/18 12:48 PM.  Always use your most recent med list.                   Brand Name Dispense Instructions for use Diagnosis    albuterol 90 MCG/ACT inhaler     1 Inhaler    Inhale 2 puffs into the lungs every 4 hours as needed for shortness of breath / dyspnea (or 20 minutes prior to excercise).    Intermittent asthma       ALPRAZolam 0.5 MG tablet    XANAX    5 tablet    Take 1 tablet (0.5 mg) by mouth nightly as needed    Anxiety       citalopram 20 MG tablet    celeXA    30 tablet    Take 1 tablet (20 mg) by mouth daily    Anxiety       norethindrone 0.35 MG per tablet    MICRONOR    84  tablet    Take 1 tablet (0.35 mg) by mouth daily    Routine postpartum follow-up       prenatal multivitamin plus iron 27-0.8 MG Tabs per tablet      Take 1 tablet by mouth daily    Supervision of high risk pregnancy in first trimester       zolpidem 12.5 MG CR tablet    AMBIEN CR    7 tablet    Take 1 tablet (12.5 mg) by mouth nightly as needed for sleep

## 2018-09-18 NOTE — PROGRESS NOTES
SUBJECTIVE:                                                   Yulisa Rai is a 38 year old female who presents to clinic today for the following health issue(s):  Patient presents with:  Consult: Anxiety       HPI:  Has been on Celexa and xanax for three days. Felt hung over with xanax and had to pump and dump. No sleep. Anxiety worsened. Went to ER and given Ambien CR. Slept for first time! No hangover. Less issues with breast feeding.       No LMP recorded. Patient is not currently having periods (Reason: Breast Feeding)..   Patient is not sexually active, .  Using none for contraception.    reports that she has never smoked. She has never used smokeless tobacco.    STD testing offered?  Declined    Health maintenance updated:  yes    Today's PHQ-2 Score: No flowsheet data found.  Today's PHQ-9 Score:   PHQ-9 SCORE 2018   Total Score -   Total Score 12     Today's BRIJESH-7 Score:   BRIJESH-7 SCORE 2018   Total Score 21       Problem list and histories reviewed & adjusted, as indicated.  Additional history: as documented.    Patient Active Problem List   Diagnosis     Papanicolaou smear of cervix with atypical squamous cells of undetermined significance (ASC-US)     Personal history of other musculoskeletal disorders     Herpes simplex virus (HSV) infection     Cyst of Bartholin's gland     Papanicolaou smear of cervix with low grade squamous intraepithelial lesion (LGSIL)     Bunions     CARDIOVASCULAR SCREENING; LDL GOAL LESS THAN 160     Generalized anxiety disorder     Intermittent asthma     Indication for care in labor or delivery     Past Surgical History:   Procedure Laterality Date     C NONSPECIFIC PROCEDURE      wisdom teeth extraction     C NONSPECIFIC PROCEDURE      rt. wrist fx     C NONSPECIFIC PROCEDURE  , 3/07    ASCUS, lgsil     C NONSPECIFIC PROCEDURE  3/03, 3/07    colposcopy     D & C  ,      GYN SURGERY  2017    ibroid removed     HEAD & NECK SURGERY       wisdom teeth     HYSTEROSCOPY, ABLATE ENDOMETRIUM VERSAPOINT , COMBINED N/A 7/20/2017    Procedure: COMBINED HYSTEROSCOPY, ABLATE ENDOMETRIUM VERSAPOINT;  HYSTEROSCOPY WITH VERSA POINT MYOMECTOMY x 1, MULTIPLE POLYPECTOMIES ; LAPAROSCOPY FOR MYOMECTOMY X2 (<5cm);  Surgeon: Josef Masters MD;  Location: [facility]     LAPAROSCOPIC MYOMECTOMY UTERUS  7/20/2017    Procedure: LAPAROSCOPIC MYOMECTOMY UTERUS;;  Surgeon: Josef Masters MD;  Location: [facility]      Social History   Substance Use Topics     Smoking status: Never Smoker     Smokeless tobacco: Never Used     Alcohol use No      Comment: none since pg      Problem (# of Occurrences) Relation (Name,Age of Onset)    C.A.D. (2) Paternal Grandmother, Paternal Grandfather    Hypertension (2) Mother, Father    Lipids (2) Mother, Father            Current Outpatient Prescriptions   Medication Sig     albuterol 90 MCG/ACT inhaler Inhale 2 puffs into the lungs every 4 hours as needed for shortness of breath / dyspnea (or 20 minutes prior to excercise).     ALPRAZolam (XANAX) 0.5 MG tablet Take 1 tablet (0.5 mg) by mouth nightly as needed     citalopram (CELEXA) 20 MG tablet Take 1 tablet (20 mg) by mouth daily     norethindrone (MICRONOR) 0.35 MG per tablet Take 1 tablet (0.35 mg) by mouth daily     Prenatal Vit-Fe Fumarate-FA (PRENATAL MULTIVITAMIN PLUS IRON) 27-0.8 MG TABS per tablet Take 1 tablet by mouth daily     zolpidem (AMBIEN CR) 12.5 MG CR tablet Take 1 tablet (12.5 mg) by mouth nightly as needed for sleep     No current facility-administered medications for this visit.      No Known Allergies    ROS:  12 point review of systems negative other than symptoms noted below.    OBJECTIVE:     /70  Wt 203 lb 12.8 oz (92.4 kg)  BMI 31.92 kg/m2  Body mass index is 31.92 kg/(m^2).    Exam:  Constitutional:  Appearance: Well nourished, well developed alert, in no acute distress  Neurologic/Psychiatric:  Mental Status:  Oriented X3      In-Clinic Test  Results:  No results found for this or any previous visit (from the past 24 hour(s)).    ASSESSMENT/PLAN:                                                        ICD-10-CM    1. Anxiety F41.9    2. Adjustment insomnia F51.02        Reviewed Ambien and nursing. Will use for a week. Expect Celexa to start working. May decrease dosage of the Ambien after a week.   Will call for refill.  RTC at 4 weeks on Celexa.  Plans to increase night time feeding so baby sleeps better.    Moisés Starr MD  [facility]

## 2018-09-19 NOTE — TELEPHONE ENCOUNTER
Yulisa calling back, seen in ED last night and prescribed Ambien for her post partum anxiety.  Did sleep well last night, did take again approx 2 hours ago with no sleep thus far.  Wondering if she should / could take another dose ?  Advised not to take another dose, did review home care related to anxiety which is not new for her, does have history of this.  Plans to call clinic tomorrow to see if OB provider has another sleeping pill she could try ?      Reason for Disposition    Symptoms interfere with sleep or daily activities    [1] Started on anti-anxiety medication AND [2] no relief    Additional Information    Negative: Chest pain    Negative: Palpitations, skipped heart beat, or rapid heart beat    Negative: Cough is main symptom    Negative: Suicide thoughts, threats, attempts, or questions    Negative: Depression is main problem or symptom (e.g., feelings of sadness or hopelessness)    Negative: [1] Difficulty breathing AND [2] persists > 10 minutes AND [3] not relieved by reassurance provided by triager    Negative: [1] Lightheadedness or dizziness AND [2] persists > 10 minutes AND [3] not relieved by reassurance provided by triager    Negative: Taking medication containing theophylline (e.g., Theodur)    Negative: [1] Known or suspected alcohol or drug abuse AND [2] feeling very shaky (i.e., visible tremors of hands)    Negative: Patient sounds very sick or weak to the triager    Negative: Symptoms interfere with work or school    Negative: Requesting to talk to a counselor (e.g., mental health worker, psychiatrist)    Negative: Patient sounds very upset or troubled to the triager    Normal anxiety (all triage questions negative)    Protocols used: ANXIETY AND PANIC ATTACK-ADULT-

## 2018-09-20 LAB
FINAL DIAGNOSIS: NORMAL
HPV HR 12 DNA CVX QL NAA+PROBE: NEGATIVE
HPV16 DNA SPEC QL NAA+PROBE: NEGATIVE
HPV18 DNA SPEC QL NAA+PROBE: NEGATIVE
SPECIMEN DESCRIPTION: NORMAL
SPECIMEN SOURCE CVX/VAG CYTO: NORMAL

## 2018-09-20 ASSESSMENT — PATIENT HEALTH QUESTIONNAIRE - PHQ9: SUM OF ALL RESPONSES TO PHQ QUESTIONS 1-9: 12

## 2018-09-20 ASSESSMENT — ANXIETY QUESTIONNAIRES: GAD7 TOTAL SCORE: 21

## 2018-09-24 DIAGNOSIS — F51.02 ADJUSTMENT INSOMNIA: Primary | ICD-10-CM

## 2018-09-24 NOTE — TELEPHONE ENCOUNTER
Pt calling to request  Refill for the ambien. She says she thinks the celexa is starting to help- has started feeling better the last 48 hours.    Will need to get Dr Pittman approval and signature.   Please fill at the [facility] on [address].    OV 9/18/18  Reviewed Ambien and nursing. Will use for a week. Expect Celexa to start working. May decrease dosage of the Ambien after a week.   Will call for refill.  RTC at 4 weeks on Celexa.  Plans to increase night time feeding so baby sleeps better.     Moisés Strar MD    Routing to Matty to advise- med is pended.

## 2018-09-25 RX ORDER — ZOLPIDEM TARTRATE 6.25 MG/1
6.25 TABLET, FILM COATED, EXTENDED RELEASE ORAL
Qty: 7 TABLET | Refills: 0 | Status: SHIPPED | OUTPATIENT
Start: 2018-09-25 | End: 2020-07-27

## 2018-09-27 ENCOUNTER — TELEPHONE (OUTPATIENT)
Dept: OBGYN | Facility: CLINIC | Age: 38
End: 2018-09-27

## 2018-09-27 DIAGNOSIS — F41.9 ANXIETY: ICD-10-CM

## 2018-09-27 RX ORDER — CITALOPRAM HYDROBROMIDE 20 MG/1
20 TABLET ORAL DAILY
Qty: 30 TABLET | Refills: 0 | Status: SHIPPED | OUTPATIENT
Start: 2018-09-27 | End: 2018-10-06

## 2018-09-27 NOTE — TELEPHONE ENCOUNTER
"Pt calling after starting on Citalopram 20 mg 9/14/18 after a visit with Dr. Starr. She was to follow up in 30 days and she wanted to return to Dr. Patino for the follow up but couldn't get in within the 30 days. She will need a refill before then. She scheduled appt 10/15/18.  Pt feels so much better on the Celexa 20 mg, \"like 100 times better\". No adverse SE's  Refill sent for another 30 days as she has appt scheduled with  and is aware she will need to be seen by Dr. Patino for further refills.   Pt verbalized understanding and no further questions.    "

## 2018-10-06 DIAGNOSIS — F41.9 ANXIETY: ICD-10-CM

## 2018-10-08 NOTE — TELEPHONE ENCOUNTER
"Requested Prescriptions   Pending Prescriptions Disp Refills     citalopram (CELEXA) 20 MG tablet [Pharmacy Med Name: CITALOPRAM 20MG TABLETS] 90 tablet 0     Sig: TAKE 1 TABLET(20 MG) BY MOUTH DAILY    SSRIs Protocol Passed    10/6/2018  9:44 AM       Passed - Recent (12 mo) or future (30 days) visit within the authorizing provider's specialty    Patient had office visit in the last 12 months or has a visit in the next 30 days with authorizing provider or within the authorizing provider's specialty.  See \"Patient Info\" tab in inbasket, or \"Choose Columns\" in Meds & Orders section of the refill encounter.           Passed - Patient is age 18 or older       Passed - No active pregnancy on record       Passed - No positive pregnancy test in last 12 months        Last Written Prescription Date:  9/27/18  Last Fill Quantity: 30,  # refills: 0   Last office visit: 9/18/2018 with prescribing provider:  Sukumar   Future Office Visit:   Next 5 appointments (look out 90 days)     Oct 15, 2018 12:10 PM CDT   Office Visit with Lorraine Patino MD   [facility] ([facility])    [address] [id number] [phone]                   "

## 2018-10-09 RX ORDER — CITALOPRAM HYDROBROMIDE 20 MG/1
TABLET ORAL
Qty: 90 TABLET | Refills: 0 | Status: SHIPPED | OUTPATIENT
Start: 2018-10-09 | End: 2018-10-15

## 2018-10-15 ENCOUNTER — OFFICE VISIT (OUTPATIENT)
Dept: OBGYN | Facility: CLINIC | Age: 38
End: 2018-10-15
Payer: COMMERCIAL

## 2018-10-15 VITALS
HEART RATE: 72 BPM | BODY MASS INDEX: 32.33 KG/M2 | DIASTOLIC BLOOD PRESSURE: 70 MMHG | SYSTOLIC BLOOD PRESSURE: 110 MMHG | WEIGHT: 206 LBS | HEIGHT: 67 IN

## 2018-10-15 DIAGNOSIS — Z23 NEED FOR PROPHYLACTIC VACCINATION AND INOCULATION AGAINST INFLUENZA: Primary | ICD-10-CM

## 2018-10-15 DIAGNOSIS — F41.9 ANXIETY: ICD-10-CM

## 2018-10-15 PROCEDURE — 99213 OFFICE O/P EST LOW 20 MIN: CPT | Performed by: OBSTETRICS & GYNECOLOGY

## 2018-10-15 PROCEDURE — 90686 IIV4 VACC NO PRSV 0.5 ML IM: CPT | Performed by: OBSTETRICS & GYNECOLOGY

## 2018-10-15 PROCEDURE — 90471 IMMUNIZATION ADMIN: CPT | Performed by: OBSTETRICS & GYNECOLOGY

## 2018-10-15 RX ORDER — CITALOPRAM HYDROBROMIDE 20 MG/1
20 TABLET ORAL DAILY
Qty: 90 TABLET | Refills: 3 | Status: SHIPPED | OUTPATIENT
Start: 2018-10-15 | End: 2020-03-18

## 2018-10-15 ASSESSMENT — ANXIETY QUESTIONNAIRES
GAD7 TOTAL SCORE: 3
5. BEING SO RESTLESS THAT IT IS HARD TO SIT STILL: NOT AT ALL
1. FEELING NERVOUS, ANXIOUS, OR ON EDGE: SEVERAL DAYS
2. NOT BEING ABLE TO STOP OR CONTROL WORRYING: NOT AT ALL
IF YOU CHECKED OFF ANY PROBLEMS ON THIS QUESTIONNAIRE, HOW DIFFICULT HAVE THESE PROBLEMS MADE IT FOR YOU TO DO YOUR WORK, TAKE CARE OF THINGS AT HOME, OR GET ALONG WITH OTHER PEOPLE: SOMEWHAT DIFFICULT
7. FEELING AFRAID AS IF SOMETHING AWFUL MIGHT HAPPEN: NOT AT ALL
3. WORRYING TOO MUCH ABOUT DIFFERENT THINGS: SEVERAL DAYS
6. BECOMING EASILY ANNOYED OR IRRITABLE: SEVERAL DAYS

## 2018-10-15 ASSESSMENT — PATIENT HEALTH QUESTIONNAIRE - PHQ9: 5. POOR APPETITE OR OVEREATING: NOT AT ALL

## 2018-10-15 NOTE — PROGRESS NOTES
SUBJECTIVE:                                                   Yulisa Rai is a 38 year old female who presents to clinic today for the following health issue(s):  Patient presents with:  Recheck Medication: here to follow up on citalopram-doing much better.      HPI:  Patient feels much better on the celexa  Is sleeping ok  Wants to continue  Going back to work in Nov    No LMP recorded. Patient is not currently having periods (Reason: Breast Feeding)..   Patient is not sexually active, .  Using oral contraceptives for contraception.    reports that she has never smoked. She has never used smokeless tobacco.    STD testing offered?  Declined    Health maintenance updated:  yes    Today's PHQ-2 Score: No flowsheet data found.  Today's PHQ-9 Score:   PHQ-9 SCORE 10/15/2018   Total Score -   Total Score 2     Today's BRIJESH-7 Score:   BRIJESH-7 SCORE 10/15/2018   Total Score 3       Problem list and histories reviewed & adjusted, as indicated.  Additional history: as documented.    Patient Active Problem List   Diagnosis     Personal history of other musculoskeletal disorders     Herpes simplex virus (HSV) infection     Cyst of Bartholin's gland     ASCUS with positive high risk HPV cervical     Bunions     CARDIOVASCULAR SCREENING; LDL GOAL LESS THAN 160     Generalized anxiety disorder     Intermittent asthma     Indication for care in labor or delivery     Past Surgical History:   Procedure Laterality Date     C NONSPECIFIC PROCEDURE      wisdom teeth extraction     C NONSPECIFIC PROCEDURE      rt. wrist fx     C NONSPECIFIC PROCEDURE  , 3/07    ASCUS, lgsil     C NONSPECIFIC PROCEDURE  3/03, 3/07    colposcopy     D & C  ,      GYN SURGERY  2017    ibroid removed     HEAD & NECK SURGERY      wisdom teeth     HYSTEROSCOPY, ABLATE ENDOMETRIUM VERSAPOINT , COMBINED N/A 2017    Procedure: COMBINED HYSTEROSCOPY, ABLATE ENDOMETRIUM VERSAPOINT;  HYSTEROSCOPY WITH VERSA POINT MYOMECTOMY x  "1, MULTIPLE POLYPECTOMIES ; LAPAROSCOPY FOR MYOMECTOMY X2 (<5cm);  Surgeon: Josef Masters MD;  Location: [facility]     LAPAROSCOPIC MYOMECTOMY UTERUS  7/20/2017    Procedure: LAPAROSCOPIC MYOMECTOMY UTERUS;;  Surgeon: Josef Masters MD;  Location: [facility]      Social History   Substance Use Topics     Smoking status: Never Smoker     Smokeless tobacco: Never Used     Alcohol use No      Comment: none since pg      Problem (# of Occurrences) Relation (Name,Age of Onset)    C.A.D. (2) Paternal Grandmother, Paternal Grandfather    Hypertension (2) Mother, Father    Lipids (2) Mother, Father            Current Outpatient Prescriptions   Medication Sig     albuterol 90 MCG/ACT inhaler Inhale 2 puffs into the lungs every 4 hours as needed for shortness of breath / dyspnea (or 20 minutes prior to excercise).     ALPRAZolam (XANAX) 0.5 MG tablet Take 1 tablet (0.5 mg) by mouth nightly as needed     citalopram (CELEXA) 20 MG tablet Take 1 tablet (20 mg) by mouth daily     norethindrone (MICRONOR) 0.35 MG per tablet Take 1 tablet (0.35 mg) by mouth daily     Prenatal Vit-Fe Fumarate-FA (PRENATAL MULTIVITAMIN PLUS IRON) 27-0.8 MG TABS per tablet Take 1 tablet by mouth daily     zolpidem (AMBIEN CR) 6.25 MG CR tablet Take 1 tablet (6.25 mg) by mouth nightly as needed for sleep     [DISCONTINUED] citalopram (CELEXA) 20 MG tablet TAKE 1 TABLET(20 MG) BY MOUTH DAILY     No current facility-administered medications for this visit.      No Known Allergies    ROS:  12 point review of systems negative other than symptoms noted below.  Psychiatric: Anxiety    OBJECTIVE:     /70  Pulse 72  Ht 5' 7\" (1.702 m)  Wt 206 lb (93.4 kg)  BMI 32.26 kg/m2  Body mass index is 32.26 kg/(m^2).    Exam:  Constitutional:  Appearance: Well nourished, well developed alert, in no acute distress  Chest:  Respiratory Effort:  Breathing unlabored  Skin:General Inspection:  No rashes present, no lesions present, no areas of discoloration; " Genitalia and Groin:  No rashes present, no lesions present, no areas of discoloration, no masses present.  Neurologic/Psychiatric:  Mental Status:  Oriented X3      In-Clinic Test Results:  No results found for this or any previous visit (from the past 24 hour(s)).    ASSESSMENT/PLAN:                                                        ICD-10-CM    1. Anxiety F41.9 citalopram (CELEXA) 20 MG tablet       There are no Patient Instructions on file for this visit.    Refilled  celexa  Return 1 years or prn if needed  Discussed pp depression/anxiety    Lorraine Patino MD  [facility]

## 2018-10-15 NOTE — PROGRESS NOTES

## 2018-10-15 NOTE — MR AVS SNAPSHOT
"              After Visit Summary   10/15/2018    Yulisa Rai    MRN: [id number]           Patient Information     Date Of Birth          1980        Visit Information        Provider Department      10/15/2018 12:10 PM Lorraine Patino MD [facility]        Today's Diagnoses     Anxiety           Follow-ups after your visit        Who to contact     If you have questions or need follow up information about today's clinic visit or your schedule please contact [facility] directly at [phone].  Normal or non-critical lab and imaging results will be communicated to you by Povohart, letter or phone within 4 business days after the clinic has received the results. If you do not hear from us within 7 days, please contact the clinic through [email or web address] or phone. If you have a critical or abnormal lab result, we will notify you by phone as soon as possible.  Submit refill requests through Semantify or call your pharmacy and they will forward the refill request to us. Please allow 3 business days for your refill to be completed.          Additional Information About Your Visit        MyChart Information     Sem[employer] gives you secure access to your electronic health record. If you see a primary care provider, you can also send messages to your care team and make appointments. If you have questions, please call your primary care clinic.  If you do not have a primary care provider, please call [phone] and they will assist you.        Care EveryWhere ID     This is your Care EveryWhere ID. This could be used by other organizations to access your [place] medical records  [id number]        Your Vitals Were     Pulse Height BMI (Body Mass Index)             72 5' 7\" (1.702 m) 32.26 kg/m2          Blood Pressure from Last 3 Encounters:   10/15/18 110/70   09/18/18 110/70   09/17/18 (!) 142/98    Weight from Last 3 Encounters:   10/15/18 206 lb (93.4 kg)   09/18/18 203 lb " 12.8 oz (92.4 kg)   09/17/18 205 lb (93 kg)              Today, you had the following     No orders found for display         Today's Medication Changes          These changes are accurate as of 10/15/18 12:21 PM.  If you have any questions, ask your nurse or doctor.               These medicines have changed or have updated prescriptions.        Dose/Directions    citalopram 20 MG tablet   Commonly known as:  celeXA   This may have changed:  See the new instructions.   Used for:  Anxiety        Dose:  20 mg   Take 1 tablet (20 mg) by mouth daily   Quantity:  90 tablet   Refills:  3            Where to get your medicines      These medications were sent to [facility] - [place], [place] - [address]  [address], [place] [address]     Phone:  [phone]     citalopram 20 MG tablet                Primary Care Provider Office Phone # Fax #    Lorraine Patino [phone] [address]  [facility] [address]        Equal Access to Services     [facility]: Hadii aad ku hadasho Soomaali, waaxda luqadaha, qaybta kaalmada adeegyada, waxay john hayherminia arellano . So [facility] [phone].    ATENCIÓN: Si habla español, tiene a burkett disposición servicios gratuitos de asistencia lingüística. [facility] [phone].    We comply with applicable federal civil rights laws and [place] laws. We do not discriminate on the basis of race, color, national origin, age, disability, sex, sexual orientation, or gender identity.            Thank you!     Thank you for choosing [facility]  for your care. Our goal is always to provide you with excellent care. Hearing back from our patients is one way we can continue to improve our services. Please take a few minutes to complete the written survey that you may receive in the mail after your visit with us. Thank you!             Your Updated Medication List - Protect others around  you: Learn how to safely use, store and throw away your medicines at www.disposemymeds.org.          This list is accurate as of 10/15/18 12:21 PM.  Always use your most recent med list.                   Brand Name Dispense Instructions for use Diagnosis    albuterol 90 MCG/ACT inhaler     1 Inhaler    Inhale 2 puffs into the lungs every 4 hours as needed for shortness of breath / dyspnea (or 20 minutes prior to excercise).    Intermittent asthma       ALPRAZolam 0.5 MG tablet    XANAX    5 tablet    Take 1 tablet (0.5 mg) by mouth nightly as needed    Anxiety       citalopram 20 MG tablet    celeXA    90 tablet    Take 1 tablet (20 mg) by mouth daily    Anxiety       norethindrone 0.35 MG per tablet    MICRONOR    84 tablet    Take 1 tablet (0.35 mg) by mouth daily    Routine postpartum follow-up       prenatal multivitamin plus iron 27-0.8 MG Tabs per tablet      Take 1 tablet by mouth daily    Supervision of high risk pregnancy in first trimester       zolpidem 6.25 MG CR tablet    AMBIEN CR    7 tablet    Take 1 tablet (6.25 mg) by mouth nightly as needed for sleep    Adjustment insomnia

## 2018-10-16 ASSESSMENT — ANXIETY QUESTIONNAIRES: GAD7 TOTAL SCORE: 3

## 2018-10-16 ASSESSMENT — PATIENT HEALTH QUESTIONNAIRE - PHQ9: SUM OF ALL RESPONSES TO PHQ QUESTIONS 1-9: 2

## 2019-07-25 ENCOUNTER — TELEPHONE (OUTPATIENT)
Dept: OBGYN | Facility: CLINIC | Age: 39
End: 2019-07-25

## 2019-07-25 DIAGNOSIS — D21.9 FIBROIDS: Primary | ICD-10-CM

## 2019-07-25 NOTE — TELEPHONE ENCOUNTER
Calling to make an appointment to f/u on fibroids after pregnancy - can we get an order for ultrasound to do all in same day?    I will call patient once order given.

## 2019-08-19 NOTE — PROGRESS NOTES
SUBJECTIVE:                                                   Yulisa Rai is a 39 year old female who presents to clinic today for the following health issue(s):  Patient presents with:  Ultrasound      HPI: The patient is seen at this time for follow-up of a past history of myomectomy that eventually led to a successful pregnancy and healthy baby.  She is here for ultrasound follow-up.  She is not on any suppressive medication and has no menorrhagia or pain.      Patient's last menstrual period was 2019..   Patient is sexually active, .  Using none for contraception.    reports that she has never smoked. She has never used smokeless tobacco.    STD testing offered?  Declined    Health maintenance updated:  yes    Today's PHQ-2 Score: No flowsheet data found.  Today's PHQ-9 Score:   PHQ-9 SCORE 10/15/2018   PHQ-9 Total Score -   PHQ-9 Total Score 2     Today's BRIJESH-7 Score:   BRIJESH-7 SCORE 10/15/2018   Total Score 3       Problem list and histories reviewed & adjusted, as indicated.  Additional history: as documented.    Patient Active Problem List   Diagnosis     Personal history of other musculoskeletal disorders     Herpes simplex virus (HSV) infection     Cyst of Bartholin's gland     ASCUS with positive high risk HPV cervical     Bunions     CARDIOVASCULAR SCREENING; LDL GOAL LESS THAN 160     Generalized anxiety disorder     Intermittent asthma     Indication for care in labor or delivery     Past Surgical History:   Procedure Laterality Date     C NONSPECIFIC PROCEDURE      wisdom teeth extraction     C NONSPECIFIC PROCEDURE      rt. wrist fx     C NONSPECIFIC PROCEDURE  , 3/07    ASCUS, lgsil     C NONSPECIFIC PROCEDURE  3/03, 3/07    colposcopy     D & C  ,      GYN SURGERY  2017    ibroid removed     HEAD & NECK SURGERY      wisdom teeth     HYSTEROSCOPY, ABLATE ENDOMETRIUM VERSAPOINT , COMBINED N/A 2017    Procedure: COMBINED HYSTEROSCOPY, ABLATE ENDOMETRIUM  VERSAPOINT;  HYSTEROSCOPY WITH VERSA POINT MYOMECTOMY x 1, MULTIPLE POLYPECTOMIES ; LAPAROSCOPY FOR MYOMECTOMY X2 (<5cm);  Surgeon: Josef Masters MD;  Location: [facility]     LAPAROSCOPIC MYOMECTOMY UTERUS  7/20/2017    Procedure: LAPAROSCOPIC MYOMECTOMY UTERUS;;  Surgeon: Josef Masters MD;  Location: [facility]      Social History     Tobacco Use     Smoking status: Never Smoker     Smokeless tobacco: Never Used   Substance Use Topics     Alcohol use: No     Comment: none since pg      Problem (# of Occurrences) Relation (Name,Age of Onset)    C.A.D. (2) Paternal Grandmother, Paternal Grandfather    Hypertension (2) Mother, Father    Lipids (2) Mother, Father            Current Outpatient Medications   Medication Sig     albuterol 90 MCG/ACT inhaler Inhale 2 puffs into the lungs every 4 hours as needed for shortness of breath / dyspnea (or 20 minutes prior to excercise).     ALPRAZolam (XANAX) 0.5 MG tablet Take 1 tablet (0.5 mg) by mouth nightly as needed (Patient taking differently: Take 0.5 mg by mouth nightly as needed )     citalopram (CELEXA) 20 MG tablet Take 1 tablet (20 mg) by mouth daily     Multiple Vitamins-Minerals (MULTIVITAMIN PO)      zolpidem (AMBIEN CR) 6.25 MG CR tablet Take 1 tablet (6.25 mg) by mouth nightly as needed for sleep (Patient taking differently: Take 6.25 mg by mouth nightly as needed for sleep )     No current facility-administered medications for this visit.      No Known Allergies    ROS:  12 point review of systems negative other than symptoms noted below.    OBJECTIVE:     /66   Wt 89.9 kg (198 lb 3.2 oz)   LMP 07/31/2019   Breastfeeding? No   BMI 31.04 kg/m    Body mass index is 31.04 kg/m .    Exam:  Constitutional:  Appearance: Well nourished, well developed alert, in no acute distress     In-Clinic Test Results:  Results for orders placed or performed in visit on 08/20/19   US Transvaginal Non OB    Narrative    US Transvaginal Non OB   Order #: [id number] Accession  #: [id number]   Study Notes      Fatou Palma on 8/20/2019  1:02 PM   Gynecological Ultrasound Report  Pelvic U/S - Transvaginal    [facility]  Referring Provider: Dr. Josef Masters  Sonographer: Fatoubhavna Palma MS  Indication: F/U fibroids  LMP (mm/dd/yyyy): 07/31/19  History:   Gynecological Ultrasonography:   Uterus: anteverted  Size: 7.04 x 5.26 x 4.01cm.    Findings: Posterior intramural fibroid= 1.6x 1.2x .8cm,  Anterior   intramural fibroid= .9x .8x .6cm  Endometrium: Thickness total 7.99mm  Findings: Normal  Right Ovary: 3.46 x 3.21 x 2.35cm.    Findings: Hemorrhagic right ovarian cyst= 2.5x 2x 1.1cm  Left Ovary: 2.96 x 1.47 x 1.96cm.   Cul de Sac/Pouch of El: FF near right ovary      Impression: Small myomas.  Functional cyst on right ovary  ___________________________________________________________________________  _______           Discussed here         ASSESSMENT/PLAN:                                                      Excellent follow-up ultrasound.  Patient's fibroids are small and intramural.  There is nothing impinging on the cavity.  It appears that she is ovulatory at this time on the right side.  We have conveyed these findings and given her the greenlight for attempting her next pregnancy.          Josef Masters MD  [facility]

## 2019-08-20 ENCOUNTER — ANCILLARY PROCEDURE (OUTPATIENT)
Dept: ULTRASOUND IMAGING | Facility: CLINIC | Age: 39
End: 2019-08-20
Attending: OBSTETRICS & GYNECOLOGY
Payer: COMMERCIAL

## 2019-08-20 ENCOUNTER — OFFICE VISIT (OUTPATIENT)
Dept: OBGYN | Facility: CLINIC | Age: 39
End: 2019-08-20
Attending: OBSTETRICS & GYNECOLOGY
Payer: COMMERCIAL

## 2019-08-20 VITALS — BODY MASS INDEX: 31.04 KG/M2 | SYSTOLIC BLOOD PRESSURE: 104 MMHG | WEIGHT: 198.2 LBS | DIASTOLIC BLOOD PRESSURE: 66 MMHG

## 2019-08-20 DIAGNOSIS — D21.9 FIBROIDS: ICD-10-CM

## 2019-08-20 DIAGNOSIS — D21.9 MYOMA: Primary | ICD-10-CM

## 2019-08-20 PROCEDURE — 99212 OFFICE O/P EST SF 10 MIN: CPT | Performed by: OBSTETRICS & GYNECOLOGY

## 2019-08-20 PROCEDURE — 76830 TRANSVAGINAL US NON-OB: CPT | Performed by: OBSTETRICS & GYNECOLOGY

## 2019-09-29 ENCOUNTER — HEALTH MAINTENANCE LETTER (OUTPATIENT)
Age: 39
End: 2019-09-29

## 2019-12-09 ENCOUNTER — HEALTH MAINTENANCE LETTER (OUTPATIENT)
Age: 39
End: 2019-12-09

## 2020-02-24 ENCOUNTER — TELEPHONE (OUTPATIENT)
Dept: OBGYN | Facility: CLINIC | Age: 40
End: 2020-02-24

## 2020-02-24 NOTE — TELEPHONE ENCOUNTER
Patient calling to speak with nurses, think she is having a chemical pregnancy - please call her to discuss

## 2020-02-24 NOTE — TELEPHONE ENCOUNTER
Pt believes she is having an early miscarriage.  +UPT last Thursday  Today experiencing bright red bleeding with wiping only; no clots, no cramping/pain  Reassured that spotting can be common within the 1st trimester in the absence of cramping.  Monitor for increase in bleeding, clots, pain/cramping and monitor. Repeat the UPT in 2-3 weeks to ensure negative HCG level.  BUT if positive and did not have further bleeding or cramping - may still be pregnant. Offered clinic HCG levels and pt declined - will monitor at home and with UPT.    Pt verbalized understanding, in agreement with plan, and voiced no further questions.  Jaycee Alexandre RN on 2/24/2020 at 3:15 PM

## 2020-03-06 ENCOUNTER — TELEPHONE (OUTPATIENT)
Dept: OBGYN | Facility: CLINIC | Age: 40
End: 2020-03-06

## 2020-03-06 DIAGNOSIS — O36.80X0 PREGNANCY WITH INCONCLUSIVE FETAL VIABILITY: Primary | ICD-10-CM

## 2020-03-06 NOTE — TELEPHONE ENCOUNTER
5w 5d.   Would like early US given the losses prior to her first child.  Had some spotting around 4 weeks that has resolved.  UPT still strong positive.  Told to make US and OV for next week.  Order placed.  Pt verbalized understanding, in agreement with plan, and voiced no further questions.  Arina Kim, RN on 3/6/2020 at 4:32 PM

## 2020-03-06 NOTE — TELEPHONE ENCOUNTER
Patient calling regarding new pregnancy LMP 1/26, 2nd child, hx previous miscarriage. She reports she had spotting last week and wants an US.  She did not want to schedule first prenatal visits without speaking to someone.

## 2020-03-10 NOTE — PROGRESS NOTES
"    SUBJECTIVE:                                                   Yulisa Rai is a 39 year old female who presents to clinic today for the following health issue(s):  No chief complaint on file.      HPI:  ***    No LMP recorded. (Menstrual status: UNKNOWN)..     Patient {is/is not:915234::\"is\"} sexually active, .  Using {[facility] CONTRACEPTION:268120} for contraception.    reports that she has never smoked. She has never used smokeless tobacco.  {Tobacco Cessation -- Delete if patient is a non-smoker:330600}  STD testing offered?  {[facility] GC/CHLAMYDIA:587260}    Health maintenance updated:  yes    Problem list and histories reviewed & adjusted, as indicated.  Additional history: as documented.    Patient Active Problem List   Diagnosis     Personal history of other musculoskeletal disorders     Herpes simplex virus (HSV) infection     Cyst of Bartholin's gland     ASCUS with positive high risk HPV cervical     Bunions     CARDIOVASCULAR SCREENING; LDL GOAL LESS THAN 160     Generalized anxiety disorder     Intermittent asthma     Indication for care in labor or delivery     Past Surgical History:   Procedure Laterality Date     C NONSPECIFIC PROCEDURE      wisdom teeth extraction     C NONSPECIFIC PROCEDURE      rt. wrist fx     C NONSPECIFIC PROCEDURE  , 3/07    ASCUS, lgsil     C NONSPECIFIC PROCEDURE  3/03, 3/07    colposcopy     D & C  ,      GYN SURGERY  2017    ibroid removed     HEAD & NECK SURGERY      wisdom teeth     HYSTEROSCOPY, ABLATE ENDOMETRIUM VERSAPOINT , COMBINED N/A 2017    Procedure: COMBINED HYSTEROSCOPY, ABLATE ENDOMETRIUM VERSAPOINT;  HYSTEROSCOPY WITH VERSA POINT MYOMECTOMY x 1, MULTIPLE POLYPECTOMIES ; LAPAROSCOPY FOR MYOMECTOMY X2 (<5cm);  Surgeon: Josef Masters MD;  Location: [facility]     LAPAROSCOPIC MYOMECTOMY UTERUS  2017    Procedure: LAPAROSCOPIC MYOMECTOMY UTERUS;;  Surgeon: Josef Masters MD;  Location: [facility]      Social History     Tobacco " "Use     Smoking status: Never Smoker     Smokeless tobacco: Never Used   Substance Use Topics     Alcohol use: No     Comment: none since pg      Problem (# of Occurrences) Relation (Name,Age of Onset)    C.A.D. (2) Paternal Grandmother, Paternal Grandfather    Hypertension (2) Mother, Father    Lipids (2) Mother, Father            Current Outpatient Medications   Medication Sig     albuterol 90 MCG/ACT inhaler Inhale 2 puffs into the lungs every 4 hours as needed for shortness of breath / dyspnea (or 20 minutes prior to excercise).     ALPRAZolam (XANAX) 0.5 MG tablet Take 1 tablet (0.5 mg) by mouth nightly as needed (Patient taking differently: Take 0.5 mg by mouth nightly as needed )     citalopram (CELEXA) 20 MG tablet Take 1 tablet (20 mg) by mouth daily     Multiple Vitamins-Minerals (MULTIVITAMIN PO)      zolpidem (AMBIEN CR) 6.25 MG CR tablet Take 1 tablet (6.25 mg) by mouth nightly as needed for sleep (Patient taking differently: Take 6.25 mg by mouth nightly as needed for sleep )     No current facility-administered medications for this visit.      No Known Allergies    ROS:  {[facility] ROSGYN:226399::\"12 point review of systems negative other than symptoms noted below or in the HPI.\"}  {ROS - :749828::\"No urinary frequency or dysuria, bladder or kidney problems\"}      OBJECTIVE:     There were no vitals taken for this visit.  There is no height or weight on file to calculate BMI.    Exam:  {[facility] EXAM CHOICES:995775}     In-Clinic Test Results:  No results found for this or any previous visit (from the past 24 hour(s)).    ASSESSMENT/PLAN:                                                      No diagnosis found.    There are no Patient Instructions on file for this visit.    ***    Lorraine Patino MD  [facility]  "

## 2020-03-18 ENCOUNTER — ANCILLARY PROCEDURE (OUTPATIENT)
Dept: ULTRASOUND IMAGING | Facility: CLINIC | Age: 40
End: 2020-03-18
Attending: OBSTETRICS & GYNECOLOGY
Payer: COMMERCIAL

## 2020-03-18 ENCOUNTER — OFFICE VISIT (OUTPATIENT)
Dept: OBGYN | Facility: CLINIC | Age: 40
End: 2020-03-18
Attending: OBSTETRICS & GYNECOLOGY
Payer: COMMERCIAL

## 2020-03-18 VITALS
HEIGHT: 67 IN | WEIGHT: 202 LBS | SYSTOLIC BLOOD PRESSURE: 110 MMHG | DIASTOLIC BLOOD PRESSURE: 72 MMHG | BODY MASS INDEX: 31.71 KG/M2 | HEART RATE: 78 BPM

## 2020-03-18 DIAGNOSIS — O30.049 DICHORIONIC DIAMNIOTIC TWIN PREGNANCY, ANTEPARTUM: ICD-10-CM

## 2020-03-18 DIAGNOSIS — Z13.79 GENETIC SCREENING: Primary | ICD-10-CM

## 2020-03-18 DIAGNOSIS — O36.80X0 PREGNANCY WITH INCONCLUSIVE FETAL VIABILITY: ICD-10-CM

## 2020-03-18 DIAGNOSIS — O09.91 SUPERVISION OF HIGH RISK PREGNANCY IN FIRST TRIMESTER: ICD-10-CM

## 2020-03-18 PROBLEM — O09.90 PREGNANCY, SUPERVISION, HIGH-RISK: Status: ACTIVE | Noted: 2020-03-18

## 2020-03-18 LAB
ABO + RH BLD: NORMAL
ABO + RH BLD: NORMAL
ALBUMIN UR-MCNC: NEGATIVE MG/DL
APPEARANCE UR: CLEAR
BILIRUB UR QL STRIP: NEGATIVE
BLD GP AB SCN SERPL QL: NORMAL
BLOOD BANK CMNT PATIENT-IMP: NORMAL
COLOR UR AUTO: YELLOW
ERYTHROCYTE [DISTWIDTH] IN BLOOD BY AUTOMATED COUNT: 13.3 % (ref 10–15)
GLUCOSE UR STRIP-MCNC: NEGATIVE MG/DL
HCT VFR BLD AUTO: 44.1 % (ref 35–47)
HGB BLD-MCNC: 14.6 G/DL (ref 11.7–15.7)
HGB UR QL STRIP: ABNORMAL
KETONES UR STRIP-MCNC: NEGATIVE MG/DL
LEUKOCYTE ESTERASE UR QL STRIP: NEGATIVE
MCH RBC QN AUTO: 29.8 PG (ref 26.5–33)
MCHC RBC AUTO-ENTMCNC: 33.1 G/DL (ref 31.5–36.5)
MCV RBC AUTO: 90 FL (ref 78–100)
NITRATE UR QL: NEGATIVE
NON-SQ EPI CELLS #/AREA URNS LPF: NORMAL /LPF
PH UR STRIP: 6 PH (ref 5–7)
PLATELET # BLD AUTO: 127 10E9/L (ref 150–450)
RBC # BLD AUTO: 4.9 10E12/L (ref 3.8–5.2)
RBC #/AREA URNS AUTO: NORMAL /HPF
SOURCE: ABNORMAL
SP GR UR STRIP: 1.01 (ref 1–1.03)
SPECIMEN EXP DATE BLD: NORMAL
UROBILINOGEN UR STRIP-ACNC: 0.2 EU/DL (ref 0.2–1)
WBC # BLD AUTO: 6.3 10E9/L (ref 4–11)
WBC #/AREA URNS AUTO: NORMAL /HPF

## 2020-03-18 PROCEDURE — 86762 RUBELLA ANTIBODY: CPT | Performed by: OBSTETRICS & GYNECOLOGY

## 2020-03-18 PROCEDURE — 86900 BLOOD TYPING SEROLOGIC ABO: CPT | Performed by: OBSTETRICS & GYNECOLOGY

## 2020-03-18 PROCEDURE — 86780 TREPONEMA PALLIDUM: CPT | Performed by: OBSTETRICS & GYNECOLOGY

## 2020-03-18 PROCEDURE — 76817 TRANSVAGINAL US OBSTETRIC: CPT | Performed by: OBSTETRICS & GYNECOLOGY

## 2020-03-18 PROCEDURE — 36415 COLL VENOUS BLD VENIPUNCTURE: CPT | Performed by: OBSTETRICS & GYNECOLOGY

## 2020-03-18 PROCEDURE — 81001 URINALYSIS AUTO W/SCOPE: CPT | Performed by: OBSTETRICS & GYNECOLOGY

## 2020-03-18 PROCEDURE — 87086 URINE CULTURE/COLONY COUNT: CPT | Performed by: OBSTETRICS & GYNECOLOGY

## 2020-03-18 PROCEDURE — 85027 COMPLETE CBC AUTOMATED: CPT | Performed by: OBSTETRICS & GYNECOLOGY

## 2020-03-18 PROCEDURE — 99207 ZZC FIRST OB VISIT: CPT | Performed by: OBSTETRICS & GYNECOLOGY

## 2020-03-18 PROCEDURE — 86850 RBC ANTIBODY SCREEN: CPT | Performed by: OBSTETRICS & GYNECOLOGY

## 2020-03-18 PROCEDURE — 86901 BLOOD TYPING SEROLOGIC RH(D): CPT | Performed by: OBSTETRICS & GYNECOLOGY

## 2020-03-18 PROCEDURE — 87389 HIV-1 AG W/HIV-1&-2 AB AG IA: CPT | Performed by: OBSTETRICS & GYNECOLOGY

## 2020-03-18 PROCEDURE — 87340 HEPATITIS B SURFACE AG IA: CPT | Performed by: OBSTETRICS & GYNECOLOGY

## 2020-03-18 ASSESSMENT — ANXIETY QUESTIONNAIRES
7. FEELING AFRAID AS IF SOMETHING AWFUL MIGHT HAPPEN: NOT AT ALL
IF YOU CHECKED OFF ANY PROBLEMS ON THIS QUESTIONNAIRE, HOW DIFFICULT HAVE THESE PROBLEMS MADE IT FOR YOU TO DO YOUR WORK, TAKE CARE OF THINGS AT HOME, OR GET ALONG WITH OTHER PEOPLE: NOT DIFFICULT AT ALL
5. BEING SO RESTLESS THAT IT IS HARD TO SIT STILL: NOT AT ALL
2. NOT BEING ABLE TO STOP OR CONTROL WORRYING: NOT AT ALL
6. BECOMING EASILY ANNOYED OR IRRITABLE: NOT AT ALL
1. FEELING NERVOUS, ANXIOUS, OR ON EDGE: NOT AT ALL
3. WORRYING TOO MUCH ABOUT DIFFERENT THINGS: NOT AT ALL
GAD7 TOTAL SCORE: 0

## 2020-03-18 ASSESSMENT — MIFFLIN-ST. JEOR: SCORE: 1633.9

## 2020-03-18 ASSESSMENT — PATIENT HEALTH QUESTIONNAIRE - PHQ9
SUM OF ALL RESPONSES TO PHQ QUESTIONS 1-9: 0
5. POOR APPETITE OR OVEREATING: NOT AT ALL

## 2020-03-18 NOTE — PROGRESS NOTES
"  SUBJECTIVE:     HPI:    This is a 39 year old female patient,  who presents for her first obstetrical visit.    YAMIL: Not found..  She is Unknown weeks.  Her cycles are regular.  Her last menstrual period was normal.   Since her LMP, she has experienced  nausea and fatigue).   She denies emesis, abdominal pain, fatigue, headache, loss of appetite, vaginal discharge, dysuria, pelvic pain, urinary urgency, lightheadedness, urinary frequency, vaginal bleeding, hemorrhoids and constipation.    Additional History:   AMA  Will start baby ASA like last pregnancy  Unexpected twins  Can see 2 separate placentas  Works from home  Has one child had home  More nausea this time  Plans first trimester screen and inatal    Have you travelled during the pregnancy?No  Have your sexual partner(s) travelled during the pregnancy?No      HISTORY:   Planned Pregnancy: Yes  Marital Status:   Occupation:   Living in Household: Spouse and Children    Past History:  Her past medical history is non-contributory.      She has a history of  none    Since her last LMP she denies use of alcohol, tobacco and street drugs.    Past medical, surgical, social and family history were reviewed and updated in [facility].        Current Outpatient Medications   Medication     Prenatal Vit-Fe Fumarate-FA (PRENATAL PO)     sertraline (ZOLOFT) 50 MG tablet     albuterol 90 MCG/ACT inhaler     ALPRAZolam (XANAX) 0.5 MG tablet     zolpidem (AMBIEN CR) 6.25 MG CR tablet     No current facility-administered medications for this visit.      PHQ 3/18/2020   PHQ-9 Total Score 0   Q9: Thoughts of better off dead/self-harm past 2 weeks Not at all       BRIJESH-7 SCORE 2018 10/15/2018 3/18/2020   Total Score 21 3 0         ROS:   12 point review of systems negative other than symptoms noted below or in the HPI.  Constitutional: Fatigue  Gastrointestinal: Nausea      OBJECTIVE:     EXAM:  /72   Pulse 78   Ht 1.702 m (5' 7\")   Wt 91.6 kg (202 " lb)   LMP 2020 (Exact Date)   BMI 31.64 kg/m   Body mass index is 31.64 kg/m .    GENERAL: healthy, alert and no distress  EYES: Eyes grossly normal to inspection, PERRL and conjunctivae and sclerae normal  HENT: ear canals and TM's normal, nose and mouth without ulcers or lesions  NECK: no adenopathy, no asymmetry, masses, or scars and thyroid normal to palpation  SKIN: no suspicious lesions or rashes  NEURO: Normal strength and tone, mentation intact and speech normal  PSYCH: mentation appears normal, affect normal/bright    ASSESSMENT/PLAN:       ICD-10-CM    1. Genetic screening  Z13.79 Non Invasive Prenatal Test Cell Free DNA   2. Supervision of high risk pregnancy in first trimester  O09.91 ABO/Rh type and screen     Hepatitis B surface antigen     CBC with platelets     HIV Antigen Antibody Combo     Rubella Antibody IgG Quantitative     Treponema Abs w Reflex to RPR and Titer     Urine Culture Aerobic Bacterial     *UA reflex to Microscopic       39 year old , Unknown weeks of pregnancy with YAMIL of Not found.    Discussed as follows:risks of twins  Higher chance of c section  Genetic screening    Counseling given:   - Follow up in 4-6 weeks for return OB visit.  - Recommended weight gain for pregnancy: 25-35 lbs.         PLAN/PATIENT INSTRUCTIONS:    Order placed for first trimester screen due to twins and AMA  inatal at 10 wks  See me at 16 wks since is seeing [facility] around 12  Level II at 18 wks  Will need growth ultrasounds at [facility]    Labs today       Lorraine Patino MD

## 2020-03-19 ENCOUNTER — TRANSCRIBE ORDERS (OUTPATIENT)
Dept: MATERNAL FETAL MEDICINE | Facility: CLINIC | Age: 40
End: 2020-03-19

## 2020-03-19 DIAGNOSIS — O26.90 PREGNANCY RELATED CONDITION, ANTEPARTUM: Primary | ICD-10-CM

## 2020-03-19 LAB
BACTERIA SPEC CULT: NORMAL
HBV SURFACE AG SERPL QL IA: NONREACTIVE
HIV 1+2 AB+HIV1 P24 AG SERPL QL IA: NONREACTIVE
Lab: NORMAL
RUBV IGG SERPL IA-ACNC: 10 IU/ML
SPECIMEN SOURCE: NORMAL
T PALLIDUM AB SER QL: NONREACTIVE

## 2020-03-19 ASSESSMENT — ANXIETY QUESTIONNAIRES: GAD7 TOTAL SCORE: 0

## 2020-04-07 DIAGNOSIS — O09.91 SUPERVISION OF HIGH RISK PREGNANCY IN FIRST TRIMESTER: ICD-10-CM

## 2020-04-07 PROCEDURE — 36415 COLL VENOUS BLD VENIPUNCTURE: CPT | Performed by: OBSTETRICS & GYNECOLOGY

## 2020-04-07 PROCEDURE — 40000791 ZZHCL STATISTIC VERIFI PRENATAL TRISOMY 21,18,13: Mod: 90 | Performed by: OBSTETRICS & GYNECOLOGY

## 2020-04-07 PROCEDURE — 99000 SPECIMEN HANDLING OFFICE-LAB: CPT | Performed by: OBSTETRICS & GYNECOLOGY

## 2020-04-08 ENCOUNTER — TELEPHONE (OUTPATIENT)
Dept: MATERNAL FETAL MEDICINE | Facility: CLINIC | Age: 40
End: 2020-04-08

## 2020-04-08 NOTE — TELEPHONE ENCOUNTER
April 8, 2020    I spoke with Yulisa regarding upcoming virtual genetic counseling visit scheduled for 4/9/20. Yulisa was referred from primary OB for first trimester screen. Referral was reviewed at that time by GC and [facility] MD and given current [facility] COVID-19 protocol, patient was scheduled for GC only virtual visit to discuss alternative maternal serum screening options (NIPT). Since referral was received, patient elected to undergo NIPT with primary OB, which is currently pending. Given that she elected to undergo screening with primary OB, genetic counseling for AMA can be performed at time of [facility] comprehensive ultrasound, though the option to keep the virtual GC visit tomorrow was offered. We discussed that if NIPT is positive, she would be referred to our clinic at that time.     This note will be routed back to primary OB clinic.   Please contact our [facility] clinic with any questions at [phone].    Aminata Lee MS, [facility]  Maternal Fetal Medicine  [facility]  Ph: [phone]  [email or web address]

## 2020-04-09 ENCOUNTER — TELEPHONE (OUTPATIENT)
Dept: OBGYN | Facility: CLINIC | Age: 40
End: 2020-04-09

## 2020-04-09 DIAGNOSIS — O36.80X0 PREGNANCY WITH INCONCLUSIVE FETAL VIABILITY, SINGLE OR UNSPECIFIED FETUS: Primary | ICD-10-CM

## 2020-04-09 DIAGNOSIS — O30.049 DICHORIONIC DIAMNIOTIC TWIN PREGNANCY, ANTEPARTUM: ICD-10-CM

## 2020-04-09 NOTE — TELEPHONE ENCOUNTER
Called pt with information explaining MFM reasoning for cancelling today's GC apt - not helpful without the NIPT results.  US ok at 12.5weeks - order in place and explained it is only viability and not NT.  Pt understood. She is calling back to schedule for US later in the week of 4/20 (Thursday or Friday) and will ensure that NIPT results are in before US.    Pt verbalized understanding, in agreement with plan, and voiced no further questions.  Jaycee Alexandre RN on 4/9/2020 at 10:17 AM

## 2020-04-09 NOTE — TELEPHONE ENCOUNTER
"Called pt to discuss after reviewing notes from [facility]  NIPT drawn 4/7  Telephone apt for GC was cancelled for today  Reviewing notes - states she was not offered to keep the GC telephone encounter for today - it was just cancelled.  Asked if the 1st trimester US could be done in our clinic - informed her we do not perform this screen here.   Reading the notes - informed her that GC instructed that plan will be based on NIPT results. If positive, there would be earlier f/u.    Pt also expressed concern about distance of US's for her twin pregnancy. Felt 7wk to 16wk for viability is unrealistic.    Informed pt would look into her concerns and reach out to Dr Patino about earlier US for viability and call her back.  Pt verbalized understanding and ok with plan.    Reached out to [facility] and spoke with RN - NIPT results will be more helpful but due to COVID policy is \"no 1st trimester NT US's are being done on anyone\". Again NIPT results will help guide further f/u.    Called Dr Patino and discussed above who is an agreement with [facility]. Need NIPT results before next US. Willing to do a viability ONLY at 12 weeks in clinic for reassurance but there will not be NT performed as we are not qualified.    Attempted to call pt back with plan. TCB.    Routing this to Dr Patino as requested. Attempt to call later.    Jaycee Alexandre, RN on 4/9/2020 at 9:46 AM      "

## 2020-04-09 NOTE — TELEPHONE ENCOUNTER
Schedule viability ultrasound here in our clinic in 2 weeks    NIPT should be back by then since drawn on 4/7 and can take 2 weeks.  She only needs an ultrasound appointment that day, not a provider visit since I am out of clinic that week.     We can not do nuchal fold translucency in our clinic and maternal fetal has decided they aren't going to do them during pandemic so not available at this time    She can still have the genetic counseling virtual visit after the NIPT results are back and we do her second ultrasound in clinic.  Won't be useful at this time without that information available to them.   [facility] without the two being linked at that visit.  Now we need the nipt results instead for the counselor to use during her visit.    I placed new order for the us to give info to the us tech about what is needed.   Make sure scheduling schedules this us as I have directed, not sooner as very important to allow time for the NIPT to come back.

## 2020-04-09 NOTE — TELEPHONE ENCOUNTER
Patient was told she could schedule her 12 wk genetic and ultrasound at [facility] per Dr. Patino, but they are not taking these appointments due to the virus. Please return call.

## 2020-04-16 ENCOUNTER — TELEPHONE (OUTPATIENT)
Dept: OBGYN | Facility: CLINIC | Age: 40
End: 2020-04-16

## 2020-04-16 DIAGNOSIS — O30.049 DICHORIONIC DIAMNIOTIC TWIN PREGNANCY, ANTEPARTUM: Primary | ICD-10-CM

## 2020-04-16 DIAGNOSIS — Z13.79 GENETIC SCREENING: ICD-10-CM

## 2020-04-16 NOTE — TELEPHONE ENCOUNTER
Innatal/NIPT performed 4/7/20  Results: failed analysis and recommended to delay redraw for 3-4 weeks before    Called pt to give info.  She is concerned due to her AMA and cannot get a NT by [facility].   Called [facility] and spoke with genetic counselor on reason testing failed. Failed 2 criteria of not enough fetal DNA mixed with AMA, thus not recommended to run at this time and waiting for more time of 3-4 wks in hopes for more at next sampling.  Discussion also included that being AMA with this result prenatal dx with other means may be recommended-ie [facility].  It is of course not recommended to base tx plan on NIPT alone. Would encourage repeat NIPT earliest 4/28.    Dr Patino was informed of above and she will call MFM tomorrow as late in day/clinic closed- on how to proceed.  She will then call pt tomorrow with more information.    Called and informed pt of plan and Dr Patino will call her tomorrow.  Pt states she is unsure if she even wants to repeat NIPT and would want other testing before waiting 3wks to redraw and 2 more for results.    Pt will await call from Sukumar.  Jaycee Alexandre RN on 4/16/2020 at 4:37 PM

## 2020-04-17 ENCOUNTER — MYC MEDICAL ADVICE (OUTPATIENT)
Dept: OBGYN | Facility: CLINIC | Age: 40
End: 2020-04-17

## 2020-04-17 ENCOUNTER — TRANSCRIBE ORDERS (OUTPATIENT)
Dept: MATERNAL FETAL MEDICINE | Facility: CLINIC | Age: 40
End: 2020-04-17

## 2020-04-17 ENCOUNTER — PRE VISIT (OUTPATIENT)
Dept: MATERNAL FETAL MEDICINE | Facility: CLINIC | Age: 40
End: 2020-04-17

## 2020-04-17 DIAGNOSIS — O26.90 PREGNANCY RELATED CONDITION, ANTEPARTUM: Primary | ICD-10-CM

## 2020-04-17 DIAGNOSIS — O09.519 ADVANCED MATERNAL AGE, PRIMIGRAVIDA, ANTEPARTUM: ICD-10-CM

## 2020-04-17 DIAGNOSIS — Z13.79 GENETIC SCREENING: ICD-10-CM

## 2020-04-17 DIAGNOSIS — O30.049 DICHORIONIC DIAMNIOTIC TWIN PREGNANCY, ANTEPARTUM: Primary | ICD-10-CM

## 2020-04-17 LAB — LAB SCANNED RESULT: NORMAL

## 2020-04-17 NOTE — Clinical Note
Read my progress note and my chart note to patient so you know what is going on.    I have 3 in labor and getting closer to delivery so won't be able to talk by phone this afternoon.   Her phone went to message but no voice mail when I tried to call her

## 2020-04-17 NOTE — TELEPHONE ENCOUNTER
Patient just called and received your message but still wants to speak to . She is wondering if she can call her today?

## 2020-04-17 NOTE — PROGRESS NOTES
Patient has diamniotic dichorionic twins  Advanced maternal age 39, almost 40  Had NIPT drawn ([facility]) 4/7/20    Came back with failed  sample  I spoke with genetic counselor there today  She said inadequate fetal cells, soonest they could retest was April 28.     Earlier we had planned nuchal translucency at [facility] but this was cancelled due to Covid.     I called [facility] genetic councelor this afternoon and she recommends that since patient failed NIPT, we can resubmit order for [facility] NT ultrasound and genetic counceling.  She doesn't think they will do [facility] with twins.     I placed the [facility] order this afternoon.   Attempted to call patient and it went to voice mail with no ability to leave voice message. Send my chart message to her now instead.

## 2020-04-17 NOTE — TELEPHONE ENCOUNTER
US is ordered at [facility] for next Tuesday. Just wanted to hear from Dr. Patino, if she should be worried about all this.  Assured her that we should wait until we have more information. Will forward this message to Dr. Patino as well.    Routing to provider to advise.  Arina Kim, RN on 4/17/2020 at 3:40 PM

## 2020-04-20 ENCOUNTER — VIRTUAL VISIT (OUTPATIENT)
Dept: MATERNAL FETAL MEDICINE | Facility: CLINIC | Age: 40
End: 2020-04-20
Attending: OBSTETRICS & GYNECOLOGY
Payer: COMMERCIAL

## 2020-04-20 DIAGNOSIS — O09.521 SUPERVISION OF ELDERLY MULTIGRAVIDA IN FIRST TRIMESTER: Primary | ICD-10-CM

## 2020-04-20 DIAGNOSIS — O26.90 PREGNANCY RELATED CONDITION, ANTEPARTUM: ICD-10-CM

## 2020-04-20 PROCEDURE — 40000072 ZZH STATISTIC GENETIC COUNSELING, < 16 MIN: Mod: TEL,ZF | Performed by: GENETIC COUNSELOR, MS

## 2020-04-20 NOTE — TELEPHONE ENCOUNTER
I tried calling her again today and got voicemail so left message.   She is scheduled tomorrow with [facility] for nuchal fold translucency. They will determine her degree of risk and next steps at that visit. I don't know what exactly they will recommend so we have to wait to see what they have to say.

## 2020-04-20 NOTE — PROGRESS NOTES
"[facility] Fetal [facility] [place]  Genetic Counseling Consult    Patient:  Yulisa Rai YOB: 1980   Date of Service:  4/20/20      Yulisa Rai was evaluated via a billable telephone visit at [facility] for genetic consultation given advanced maternal age and failed analysis on NIPT.    The patient has been notified of following:    This telephone visit will be conducted via a call between you and your physician/provider. We have found that certain health care needs can be provided without the need for a physical exam. This service lets us provide the care you need with a short phone conversation. If a prescription is necessary we can send it directly to your pharmacy. If lab work is needed we can place an order for that and you can then stop by our lab to have the test done at a later time.     If during the course of the call the provider feels a telephone visit is not appropriate, you will not be charged for this service.       Impression/Plan:   1. Yulisa is pregnant with spontaneous dichorionic, diamniotic twins. She had a cell-free fetal DNA screen drawn on 4/7/2020 at 10w2d gestation. [facility] laboratory reported a \"Failed Analysis\" on 4/16/2020, stating that they were unable to report results due to the sample failing to pass  criteria. In further discussion with [facility] laboratory, the fetal fraction was too low complete this testing. The laboratory has recommended delaying a potential redraw for 3-4 weeks in hopes of having an adequate fetal fraction.     2. Yulisa had a virtual genetics consultation today to review her \"failed analysis\" NIPT and options for screening and testing moving forward. Yulisa reports completing much of her own research regarding failed analyses and was very concerned about the circumstances in which this failed analysis actually increases the concern for a fetal " "aneuploidy. We reviewed the suspected reasons for low fetal fraction including early gestational age, lovenox use, high maternal BMI, and/or fetal aneuploidy. Yulisa is not currently taking lovenox or any other blood thinner. Yulisa understands that this failed analysis was most likely due to early gestational age at the time of blood draw. This does not rule out fetal aneuploidy, however, this failed analysis likely does not increase our suspicion of fetal aneuploidy at this time.     3. We discussed the options for genetic screening and testing moving forward.     Yulisa does have the option of awaiting a repeat NIPT blood draw, with the earliest draw date of 4/28/2020. She would be 13w2d gestation on that date. Assuming an approximate 1 week turn around for results, Yulisa would receive results around 14w2d gestation. If her result was another failed analysis or if a \"high risk\" result was received, Yulisa would be outside of the [facility] window and would need to await an amniocentesis for diagnostic testing.     Yulisa could also choose to proceed with [facility] testing anytime prior to 13w6d. Diagnostic testing is available to all women when pregnant, especially in circumstances of advanced maternal age. Yulisa's di/di twin pregnancy would not be a factor in availability of [facility].     Yulisa could opt to complete the nuchal translucency ultrasound tomorrow at [facility] and decline further genetic screening and testing options. Genetic screening/testing should be offered to all pregnant women, however, these are completely optional. Depending on Yulisa and her partner's desires for this information, screening could continue solely through ultrasound. Ultrasound alone cannot definitively diagnose or rule out a genetic condition, however, markers can be identified in some circumstances.    4.   After thorough discussion of this information, Yulisa wishes to proceed with nuchal translucency tomorrow. If concerns are " identified on tomorrow's ultrasound, she will likely desire [facility] procedure for diagnostic testing. If ultrasound is within normal limits, she desires to proceed with NIPT redraw through Dr. Patino's office at the recommended time interval. I encouraged her to discuss this desire with Dr. Patino to appropriately schedule a lab appointment. [facility] would not automatically receive these next NIPT results, however, we are available to Dr. Patino and Yulisa if questions or concerns arise.     Pregnancy History:   /Parity:    Age at Delivery: 40 year old  YAMIL: 2020, by Last Menstrual Period  Gestational Age: 12w1d    No significant complications or exposures were reported in the current pregnancy.    Yulisa is currently pregnant with spontaneously conceived di/di twins.     Yulisa krishnan pregnancy history is significant for:  o Blighted ovum  o SAB at 8-9 weeks gestation  o SAB at 8-9 weeks gestation  o 2018: term, vaginal delivery, female    Medical History:   Yulisa krishnan reported medical history is not expected to impact pregnancy management or risks to fetal development.       Family History:   A three-generation pedigree was obtained, and is scanned under the  Media  tab.   The reported family history is negative for multiple miscarriages, stillbirths, birth defects, mental retardation, known genetic conditions, and consanguinity.       Risk Assessment for Chromosome Conditions:   We explained that the risk for fetal chromosome abnormalities increases with maternal age. We discussed specific features of common chromosome abnormalities, including Down syndrome, trisomy 13, trisomy 18, and sex chromosome trisomies.      - At age 40 at midtrimester, the risk to have a baby with Down syndrome is 1 in 129.     - At age 40 at midtrimester, the risk to have a baby with any chromosome abnormality is 1 in 65.      Yulisa will complete a nuchal translucency ultrasound with [facility] tomorrow. If this ultrasound is within  normal limits, she plans to repeat blood draw for NIPT on or after 4/28/2020 as recommended by [facility]. At this time, there is not a high suspicion that her pregnancy is at significantly increased risk for aneuploidy.       Testing Options:   We discussed the following options:   First trimester screening    First trimester ultrasound with nuchal translucency and nasal bone assessments     Non-invasive Prenatal Testing (NIPT)    Maternal plasma cell-free DNA testing; first trimester ultrasound with nuchal translucency and nasal bone assessment is recommended, when appropriate    Screens for fetal trisomy 21, trisomy 13, trisomy 18, and sex chromosome aneuploidy    Cannot screen for open neural tube defects; maternal serum AFP after 15 weeks is recommended     Chorionic villus sampling ([facility])    Invasive procedure typically performed in the first trimester by which placental villi are obtained for the purpose of chromosome analysis and/or other prenatal genetic analysis    Diagnostic results; >99% sensitivity for fetal chromosome abnormalities    Cannot test for open neural tube defects; maternal serum AFP after 15 weeks is recommended     Genetic Amniocentesis    Invasive procedure typically performed in the second trimester by which amniotic fluid is obtained for the purpose of chromosome analysis and/or other prenatal genetic analysis    Diagnostic results; >99% sensitivity for fetal chromosome abnormalities    AFAFP measurement tests for open neural tube defects     Comprehensive (Level II) ultrasound: Detailed ultrasound performed between 18-22 weeks gestation to screen for major birth defects and markers for aneuploidy.      We reviewed the benefits and limitations of this testing.  Screening tests provide a risk assessment specific to the pregnancy for certain fetal chromosome abnormalities, but cannot definitively diagnose or exclude a fetal chromosome abnormality.  Follow-up genetic counseling  and consideration of diagnostic testing is recommended with any abnormal screening result.     Diagnostic tests carry inherent risks- including risk of miscarriage- that require careful consideration.  These tests can detect fetal chromosome abnormalities with greater than 99% certainty.  Results can be compromised by maternal cell contamination or mosaicism, and are limited by the resolution of cytogenetic G-banding technology.  There is no screening nor diagnostic test that can detect all forms of birth defects or mental disability.    Phone call contact time  Call Started at 11:00  Call Ended at 11:25      Lizzy Doshi MS, [facility]  Maternal Fetal Medicine  [facility]  Ph: [phone]  [email or web address]

## 2020-04-21 ENCOUNTER — OFFICE VISIT (OUTPATIENT)
Dept: MATERNAL FETAL MEDICINE | Facility: CLINIC | Age: 40
End: 2020-04-21
Attending: OBSTETRICS & GYNECOLOGY
Payer: COMMERCIAL

## 2020-04-21 ENCOUNTER — HOSPITAL ENCOUNTER (OUTPATIENT)
Dept: ULTRASOUND IMAGING | Facility: CLINIC | Age: 40
End: 2020-04-21
Attending: OBSTETRICS & GYNECOLOGY
Payer: COMMERCIAL

## 2020-04-21 DIAGNOSIS — O30.041 DICHORIONIC DIAMNIOTIC TWIN PREGNANCY IN FIRST TRIMESTER: Primary | ICD-10-CM

## 2020-04-21 DIAGNOSIS — O09.521 MULTIGRAVIDA OF ADVANCED MATERNAL AGE IN FIRST TRIMESTER: ICD-10-CM

## 2020-04-21 DIAGNOSIS — O26.90 PREGNANCY RELATED CONDITION, ANTEPARTUM: ICD-10-CM

## 2020-04-21 PROCEDURE — 76802 OB US < 14 WKS ADDL FETUS: CPT

## 2020-04-21 NOTE — PROGRESS NOTES
"Please see \"Imaging\" tab under Chart Review for full details.    Kaitlynn Nixon MD  Maternal Fetal Medicine    "

## 2020-04-28 ENCOUNTER — PRENATAL OFFICE VISIT (OUTPATIENT)
Dept: OBGYN | Facility: CLINIC | Age: 40
End: 2020-04-28
Attending: OBSTETRICS & GYNECOLOGY
Payer: COMMERCIAL

## 2020-04-28 ENCOUNTER — ANCILLARY PROCEDURE (OUTPATIENT)
Dept: ULTRASOUND IMAGING | Facility: CLINIC | Age: 40
End: 2020-04-28
Attending: OBSTETRICS & GYNECOLOGY
Payer: COMMERCIAL

## 2020-04-28 ENCOUNTER — TELEPHONE (OUTPATIENT)
Dept: OBGYN | Facility: CLINIC | Age: 40
End: 2020-04-28

## 2020-04-28 VITALS — BODY MASS INDEX: 31.79 KG/M2 | SYSTOLIC BLOOD PRESSURE: 120 MMHG | WEIGHT: 203 LBS | DIASTOLIC BLOOD PRESSURE: 60 MMHG

## 2020-04-28 DIAGNOSIS — O36.80X0 PREGNANCY WITH INCONCLUSIVE FETAL VIABILITY, SINGLE OR UNSPECIFIED FETUS: Primary | ICD-10-CM

## 2020-04-28 DIAGNOSIS — O20.9 BLEEDING IN EARLY PREGNANCY: ICD-10-CM

## 2020-04-28 DIAGNOSIS — O09.91 SUPERVISION OF HIGH RISK PREGNANCY IN FIRST TRIMESTER: ICD-10-CM

## 2020-04-28 DIAGNOSIS — O30.042 DICHORIONIC DIAMNIOTIC TWIN PREGNANCY IN SECOND TRIMESTER: Primary | ICD-10-CM

## 2020-04-28 PROCEDURE — 99207 ZZC PRENATAL VISIT: CPT | Performed by: OBSTETRICS & GYNECOLOGY

## 2020-04-28 PROCEDURE — 76815 OB US LIMITED FETUS(S): CPT | Performed by: OBSTETRICS & GYNECOLOGY

## 2020-04-28 RX ORDER — ASPIRIN 81 MG/1
81 TABLET ORAL DAILY
Status: ON HOLD | COMMUNITY
End: 2020-09-17

## 2020-04-28 NOTE — TELEPHONE ENCOUNTER
Appointment for US at 1615 here in [place] with F/U visit w Dr Maxwell.  Called pt and she states bleeding has tapered off a little, and not as bright red. No cramping.  Pt tearful.  Instructed pt to come at 1600. Increase po fluids and rest until visit.  Pt verbalized understanding, in agreement with plan, and voiced no further questions.  Jaycee Alexandre RN on 4/28/2020 at 2:30 PM

## 2020-04-28 NOTE — TELEPHONE ENCOUNTER
13w2d    Just used the BR and bright red bleeding in underwear and on TP with wiping; no clots.   Denies cramping or pain  Denies UTI sx's or any vaginal sx's  Stools are regular - two today and soft/no straining  Denies IC     Dr Patino informed and ordered US for bleeding in office over the ER preferred. [facility] ok if we have to.    No US availability here in clinic.  calling SI.    Explained to pt to rest/monitor and we will call her shortly with her apt/instructions.    Pt verbalized understanding, in agreement with plan, and voiced no further questions.  Jaycee Alexandre, RN on 4/28/2020 at 2:13 PM

## 2020-04-28 NOTE — PROGRESS NOTES
Patient had some bleeding earlier today.  Bleeding has stopped at this time.  No sexual intercourse  Placenta possibly at edge of cervix  mfm us scheduled for level 2

## 2020-04-30 ENCOUNTER — PRENATAL OFFICE VISIT (OUTPATIENT)
Dept: OBGYN | Facility: CLINIC | Age: 40
End: 2020-04-30
Payer: COMMERCIAL

## 2020-04-30 VITALS — BODY MASS INDEX: 32.26 KG/M2 | DIASTOLIC BLOOD PRESSURE: 62 MMHG | WEIGHT: 206 LBS | SYSTOLIC BLOOD PRESSURE: 110 MMHG

## 2020-04-30 DIAGNOSIS — O30.049 DICHORIONIC DIAMNIOTIC TWIN PREGNANCY, ANTEPARTUM: ICD-10-CM

## 2020-04-30 DIAGNOSIS — O09.91 SUPERVISION OF HIGH RISK PREGNANCY IN FIRST TRIMESTER: ICD-10-CM

## 2020-04-30 DIAGNOSIS — Z13.79 GENETIC SCREENING: ICD-10-CM

## 2020-04-30 PROCEDURE — 99207 ZZC PRENATAL VISIT: CPT | Performed by: OBSTETRICS & GYNECOLOGY

## 2020-04-30 PROCEDURE — 40000791 ZZHCL STATISTIC VERIFI PRENATAL TRISOMY 21,18,13: Mod: 90 | Performed by: OBSTETRICS & GYNECOLOGY

## 2020-04-30 PROCEDURE — 99000 SPECIMEN HANDLING OFFICE-LAB: CPT | Performed by: OBSTETRICS & GYNECOLOGY

## 2020-04-30 PROCEDURE — 36415 COLL VENOUS BLD VENIPUNCTURE: CPT | Performed by: OBSTETRICS & GYNECOLOGY

## 2020-04-30 NOTE — PROGRESS NOTES
Feeling much better  No more bleeding  Pelvic rest  Has level II scheduled  Bedside us shows active fetal movement x 2  [facility] 2 wks for afp

## 2020-05-04 LAB — LAB SCANNED RESULT: NORMAL

## 2020-05-05 ENCOUNTER — TELEPHONE (OUTPATIENT)
Dept: OBGYN | Facility: CLINIC | Age: 40
End: 2020-05-05

## 2020-05-26 ENCOUNTER — PRENATAL OFFICE VISIT (OUTPATIENT)
Dept: OBGYN | Facility: CLINIC | Age: 40
End: 2020-05-26
Payer: COMMERCIAL

## 2020-05-26 VITALS — DIASTOLIC BLOOD PRESSURE: 66 MMHG | WEIGHT: 212.8 LBS | SYSTOLIC BLOOD PRESSURE: 122 MMHG | BODY MASS INDEX: 33.33 KG/M2

## 2020-05-26 DIAGNOSIS — Z36.1 NEED FOR MATERNAL SERUM ALPHA-PROTEIN (MSAFP) SCREENING: Primary | ICD-10-CM

## 2020-05-26 DIAGNOSIS — O30.049 DICHORIONIC DIAMNIOTIC TWIN PREGNANCY, ANTEPARTUM: ICD-10-CM

## 2020-05-26 DIAGNOSIS — O09.92 SUPERVISION OF HIGH RISK PREGNANCY IN SECOND TRIMESTER: ICD-10-CM

## 2020-05-26 PROCEDURE — 36415 COLL VENOUS BLD VENIPUNCTURE: CPT | Performed by: OBSTETRICS & GYNECOLOGY

## 2020-05-26 PROCEDURE — 82105 ALPHA-FETOPROTEIN SERUM: CPT | Mod: 90 | Performed by: OBSTETRICS & GYNECOLOGY

## 2020-05-26 PROCEDURE — 99207 ZZC PRENATAL VISIT: CPT | Performed by: OBSTETRICS & GYNECOLOGY

## 2020-05-26 PROCEDURE — 99000 SPECIMEN HANDLING OFFICE-LAB: CPT | Performed by: OBSTETRICS & GYNECOLOGY

## 2020-05-26 NOTE — PROGRESS NOTES
AFP today  Level II scheduled with MFM on 6/11  No bleeding  Nausea improved      Had an edge of placenta over os on last scan, they will recheck at next us  Both breech today on bedside ultrasound  Plan to see me in clinic about 22 wks  Will need monthly growth us with mfm for twin monitoring

## 2020-05-29 LAB
# FETUSES US: NORMAL
# FETUSES: 2
AFP ADJ MOM AMN: 1.8
AFP SERPL-MCNC: 55 NG/ML
AGE - REPORTED: 38.5 YR
CURRENT SMOKER: NO
CURRENT SMOKER: NO
DIABETES STATUS PATIENT: NO
FAMILY MEMBER DISEASES HX: NO
FAMILY MEMBER DISEASES HX: NO
GA METHOD: NORMAL
GA METHOD: NORMAL
GA: NORMAL WK
IDDM PATIENT QL: NO
INTEGRATED SCN PATIENT-IMP: NORMAL
LMP START DATE: NORMAL
MONOCHORIONIC TWINS: NO
SERVICE CMNT-IMP: NO
SPECIMEN DRAWN SERPL: NORMAL
VALPROIC/CARBAMAZEPINE STATUS: NO
WEIGHT UNITS: NORMAL

## 2020-06-11 ENCOUNTER — HOSPITAL ENCOUNTER (OUTPATIENT)
Dept: ULTRASOUND IMAGING | Facility: CLINIC | Age: 40
End: 2020-06-11
Attending: OBSTETRICS & GYNECOLOGY
Payer: COMMERCIAL

## 2020-06-11 ENCOUNTER — OFFICE VISIT (OUTPATIENT)
Dept: MATERNAL FETAL MEDICINE | Facility: CLINIC | Age: 40
End: 2020-06-11
Attending: OBSTETRICS & GYNECOLOGY
Payer: COMMERCIAL

## 2020-06-11 DIAGNOSIS — O30.041 DICHORIONIC DIAMNIOTIC TWIN PREGNANCY IN FIRST TRIMESTER: ICD-10-CM

## 2020-06-11 DIAGNOSIS — O30.041 DICHORIONIC DIAMNIOTIC TWIN PREGNANCY IN FIRST TRIMESTER: Primary | ICD-10-CM

## 2020-06-11 DIAGNOSIS — O09.521 MULTIGRAVIDA OF ADVANCED MATERNAL AGE IN FIRST TRIMESTER: ICD-10-CM

## 2020-06-11 PROCEDURE — 76811 OB US DETAILED SNGL FETUS: CPT

## 2020-06-30 ENCOUNTER — PRENATAL OFFICE VISIT (OUTPATIENT)
Dept: OBGYN | Facility: CLINIC | Age: 40
End: 2020-06-30
Payer: COMMERCIAL

## 2020-06-30 VITALS — DIASTOLIC BLOOD PRESSURE: 70 MMHG | BODY MASS INDEX: 35.24 KG/M2 | SYSTOLIC BLOOD PRESSURE: 122 MMHG | WEIGHT: 225 LBS

## 2020-06-30 DIAGNOSIS — O09.92 SUPERVISION OF HIGH RISK PREGNANCY IN SECOND TRIMESTER: ICD-10-CM

## 2020-06-30 PROCEDURE — 99207 ZZC PRENATAL VISIT: CPT | Performed by: OBSTETRICS & GYNECOLOGY

## 2020-06-30 NOTE — PROGRESS NOTES
Good growth so far  Working from home   Plan c section 38 wks  mfm us next visit next week  See me 4 wks then q 2 wks

## 2020-07-02 ENCOUNTER — TELEPHONE (OUTPATIENT)
Dept: OBGYN | Facility: CLINIC | Age: 40
End: 2020-07-02

## 2020-07-02 NOTE — TELEPHONE ENCOUNTER
Daughter exposed to COVID went to peds to get tested, results pending.   Whole family had GI symptoms, N/V-all feeling better. No fever, cough, respiratory symptoms. Will quarantine for 10 days.  Routing to provider for any further recommendations.  Mindy Dolan RN on 7/2/2020 at 4:32 PM

## 2020-07-02 NOTE — TELEPHONE ENCOUNTER
Patient is pregnant with Twins and wondering what she should do. Please call patient back today.

## 2020-07-06 NOTE — TELEPHONE ENCOUNTER
Called pt with Dr Patino's response below.  Pt states family and herself feel fine, no sx's. No result on daughters Covid PCR yet but will proceed as recommended if positive.    Jaycee Alexandre RN on 7/6/2020 at 9:06 AM

## 2020-07-06 NOTE — TELEPHONE ENCOUNTER
If daughter test was positive then patient should be tested as well. If daughter negative and patient feels well now then nothing further we need to do.  Probably still a good idea to finish out her quarantine plan.

## 2020-07-09 ENCOUNTER — HOSPITAL ENCOUNTER (OUTPATIENT)
Dept: ULTRASOUND IMAGING | Facility: CLINIC | Age: 40
End: 2020-07-09
Attending: OBSTETRICS & GYNECOLOGY
Payer: COMMERCIAL

## 2020-07-09 ENCOUNTER — OFFICE VISIT (OUTPATIENT)
Dept: MATERNAL FETAL MEDICINE | Facility: CLINIC | Age: 40
End: 2020-07-09
Attending: OBSTETRICS & GYNECOLOGY
Payer: COMMERCIAL

## 2020-07-09 DIAGNOSIS — O30.042 DICHORIONIC DIAMNIOTIC TWIN PREGNANCY IN SECOND TRIMESTER: Primary | ICD-10-CM

## 2020-07-09 DIAGNOSIS — O30.041 DICHORIONIC DIAMNIOTIC TWIN PREGNANCY IN FIRST TRIMESTER: ICD-10-CM

## 2020-07-09 PROCEDURE — 76816 OB US FOLLOW-UP PER FETUS: CPT | Mod: 59

## 2020-07-27 ENCOUNTER — PRENATAL OFFICE VISIT (OUTPATIENT)
Dept: OBGYN | Facility: CLINIC | Age: 40
End: 2020-07-27
Payer: COMMERCIAL

## 2020-07-27 VITALS — SYSTOLIC BLOOD PRESSURE: 116 MMHG | WEIGHT: 229 LBS | DIASTOLIC BLOOD PRESSURE: 64 MMHG | BODY MASS INDEX: 35.87 KG/M2

## 2020-07-27 DIAGNOSIS — O09.92 SUPERVISION OF HIGH RISK PREGNANCY IN SECOND TRIMESTER: ICD-10-CM

## 2020-07-27 DIAGNOSIS — O30.049 DICHORIONIC DIAMNIOTIC TWIN PREGNANCY, ANTEPARTUM: Primary | ICD-10-CM

## 2020-07-27 PROCEDURE — 99207 ZZC PRENATAL VISIT: CPT | Performed by: OBSTETRICS & GYNECOLOGY

## 2020-07-27 NOTE — PROGRESS NOTES
Good growth on twins  2 separate placentas  Given prescription breast pump  Glucose screen, tdap here in 2 wks  Growth us with mfm next week  Good fm  No concerns, no ctx, no bleeding

## 2020-07-29 ENCOUNTER — TELEPHONE (OUTPATIENT)
Dept: OBGYN | Facility: CLINIC | Age: 40
End: 2020-07-29

## 2020-07-29 NOTE — TELEPHONE ENCOUNTER
Left message informing of Dr. Patino's comments.  Told to call with further questions or concerns.  Arina Kim RN on 7/29/2020 at 4:33 PM

## 2020-07-29 NOTE — TELEPHONE ENCOUNTER
This is normal with twins. She is quite large  Just needs to rest more  If she doesn't recover laying down then we need to hear it.

## 2020-07-29 NOTE — TELEPHONE ENCOUNTER
Patient called. 26w3d twins and measuring at 36 weeks.  Easily winded and feels like it takes a good 15 minutes of reclining or laying down.    Can happen as the babies take up more space.  Getting help in the home.  Recline as possible. Denies any other symptoms.    Routing to provider to advise of any further recommendations  Arina Kim RN on 7/29/2020 at 3:08 PM

## 2020-07-29 NOTE — TELEPHONE ENCOUNTER
Patient is 26w3d pregnant with twins, having shortness of breath and feeling winded the last couple days - only subsides when laying flat or reclining.

## 2020-08-05 DIAGNOSIS — Z23 NEED FOR TDAP VACCINATION: ICD-10-CM

## 2020-08-05 DIAGNOSIS — Z36.9 ENCOUNTER FOR ANTENATAL SCREENING OF MOTHER: Primary | ICD-10-CM

## 2020-08-06 ENCOUNTER — OFFICE VISIT (OUTPATIENT)
Dept: MATERNAL FETAL MEDICINE | Facility: CLINIC | Age: 40
End: 2020-08-06
Attending: OBSTETRICS & GYNECOLOGY
Payer: COMMERCIAL

## 2020-08-06 ENCOUNTER — HOSPITAL ENCOUNTER (OUTPATIENT)
Dept: ULTRASOUND IMAGING | Facility: CLINIC | Age: 40
End: 2020-08-06
Attending: OBSTETRICS & GYNECOLOGY
Payer: COMMERCIAL

## 2020-08-06 DIAGNOSIS — O30.042 DICHORIONIC DIAMNIOTIC TWIN PREGNANCY IN SECOND TRIMESTER: ICD-10-CM

## 2020-08-06 DIAGNOSIS — O30.042 DICHORIONIC DIAMNIOTIC TWIN PREGNANCY IN SECOND TRIMESTER: Primary | ICD-10-CM

## 2020-08-06 PROCEDURE — 76816 OB US FOLLOW-UP PER FETUS: CPT | Mod: 59

## 2020-08-06 NOTE — PROGRESS NOTES
"Please see \"Imaging\" tab under \"Chart Review\" for details of today's US.    Yola Grace, DO    "

## 2020-08-10 ENCOUNTER — PRENATAL OFFICE VISIT (OUTPATIENT)
Dept: OBGYN | Facility: CLINIC | Age: 40
End: 2020-08-10
Payer: COMMERCIAL

## 2020-08-10 VITALS — BODY MASS INDEX: 36.37 KG/M2 | DIASTOLIC BLOOD PRESSURE: 60 MMHG | WEIGHT: 232.2 LBS | SYSTOLIC BLOOD PRESSURE: 116 MMHG

## 2020-08-10 DIAGNOSIS — Z36.9 ENCOUNTER FOR ANTENATAL SCREENING OF MOTHER: ICD-10-CM

## 2020-08-10 DIAGNOSIS — Z23 NEED FOR TDAP VACCINATION: ICD-10-CM

## 2020-08-10 DIAGNOSIS — O09.93 SUPERVISION OF HIGH RISK PREGNANCY IN THIRD TRIMESTER: ICD-10-CM

## 2020-08-10 DIAGNOSIS — O09.92 SUPERVISION OF HIGH RISK PREGNANCY IN SECOND TRIMESTER: ICD-10-CM

## 2020-08-10 LAB
ERYTHROCYTE [DISTWIDTH] IN BLOOD BY AUTOMATED COUNT: 13 % (ref 10–15)
GLUCOSE 1H P 50 G GLC PO SERPL-MCNC: 84 MG/DL (ref 60–129)
HCT VFR BLD AUTO: 36.2 % (ref 35–47)
HGB BLD-MCNC: 11.9 G/DL (ref 11.7–15.7)
MCH RBC QN AUTO: 29.9 PG (ref 26.5–33)
MCHC RBC AUTO-ENTMCNC: 32.9 G/DL (ref 31.5–36.5)
MCV RBC AUTO: 91 FL (ref 78–100)
PLATELET # BLD AUTO: 119 10E9/L (ref 150–450)
RBC # BLD AUTO: 3.98 10E12/L (ref 3.8–5.2)
WBC # BLD AUTO: 8.6 10E9/L (ref 4–11)

## 2020-08-10 PROCEDURE — 90715 TDAP VACCINE 7 YRS/> IM: CPT

## 2020-08-10 PROCEDURE — 85027 COMPLETE CBC AUTOMATED: CPT | Performed by: OBSTETRICS & GYNECOLOGY

## 2020-08-10 PROCEDURE — 90471 IMMUNIZATION ADMIN: CPT

## 2020-08-10 PROCEDURE — 36415 COLL VENOUS BLD VENIPUNCTURE: CPT | Performed by: OBSTETRICS & GYNECOLOGY

## 2020-08-10 PROCEDURE — 82950 GLUCOSE TEST: CPT | Performed by: OBSTETRICS & GYNECOLOGY

## 2020-08-10 PROCEDURE — 99207 ZZC PRENATAL VISIT: CPT | Performed by: OBSTETRICS & GYNECOLOGY

## 2020-08-10 NOTE — PROGRESS NOTES
Syphilis is a sexually transmitted disease that can cause birth defects in the babies of untreated mothers. Every pregnant patient is tested for syphilis early in each pregnancy as part of the routine lab work. The [place] [facility] of [facility] has seen an increase in the rate of syphilis in [place]. The [facility] now recommends testing for syphilis 3 times during a pregnancy, the new prenatal visit, 28 weeks and when admitted for delivery. Patient declines lab testing for syphilis.

## 2020-08-10 NOTE — PROGRESS NOTES
Prior to immunization administration, verified patients identity using patient s name and date of birth. Please see Immunization Activity for additional information.     Screening Questionnaire for Adult Immunization    Are you sick today?   No   Do you have allergies to medications, food, a vaccine component or latex?   No   Have you ever had a serious reaction after receiving a vaccination?   No   Do you have a long-term health problem with heart, lung, kidney, or metabolic disease (e.g., diabetes), asthma, a blood disorder, no spleen, complement component deficiency, a cochlear implant, or a spinal fluid leak?  Are you on long-term aspirin therapy?   No   Do you have cancer, leukemia, HIV/AIDS, or any other immune system problem?   No   Do you have a parent, brother, or sister with an immune system problem?   No   In the past 3 months, have you taken medications that affect  your immune system, such as prednisone, other steroids, or anticancer drugs; drugs for the treatment of rheumatoid arthritis, Crohn s disease, or psoriasis; or have you had radiation treatments?   No   Have you had a seizure, or a brain or other nervous system problem?   No   During the past year, have you received a transfusion of blood or blood    products, or been given immune (gamma) globulin or antiviral drug?   No   For women: Are you pregnant or is there a chance you could become       pregnant during the next month?   YES   Have you received any vaccinations in the past 4 weeks?   No     Immunization questionnaire answers were all negative.        Per orders of Dr. Patino, injection of Tdap given by Oelna Dunbar CMA. Patient instructed to remain in clinic for 15 minutes afterwards, and to report any adverse reaction to me immediately.       Screening performed by Olena Dunbar CMA on 8/10/2020 at 11:20 AM.

## 2020-08-24 ENCOUNTER — PREP FOR PROCEDURE (OUTPATIENT)
Dept: OBGYN | Facility: CLINIC | Age: 40
End: 2020-08-24

## 2020-08-24 ENCOUNTER — PRENATAL OFFICE VISIT (OUTPATIENT)
Dept: OBGYN | Facility: CLINIC | Age: 40
End: 2020-08-24
Payer: COMMERCIAL

## 2020-08-24 VITALS — BODY MASS INDEX: 36.81 KG/M2 | WEIGHT: 235 LBS | SYSTOLIC BLOOD PRESSURE: 124 MMHG | DIASTOLIC BLOOD PRESSURE: 60 MMHG

## 2020-08-24 DIAGNOSIS — O30.049 DICHORIONIC DIAMNIOTIC TWIN PREGNANCY, ANTEPARTUM: Primary | ICD-10-CM

## 2020-08-24 DIAGNOSIS — O30.043 DICHORIONIC DIAMNIOTIC TWIN PREGNANCY IN THIRD TRIMESTER: Primary | ICD-10-CM

## 2020-08-24 PROCEDURE — 99207 ZZC PRENATAL VISIT: CPT | Performed by: OBSTETRICS & GYNECOLOGY

## 2020-08-24 NOTE — PROGRESS NOTES
Baby b transverse  Previa gone  Having scans q 4 wks for growth with mfm  Patient wants scheduled c section unless both babies are vtx so for now will schedule on Oct 22 at 38 1/2 wks  If position would change we will reconsider  Plan weekly bpp with mfm starting at 32 wks and growth scan with them q 4 wks  Patient is 40 yrs old   Discussed acid reflux and meds

## 2020-08-25 ENCOUNTER — TELEPHONE (OUTPATIENT)
Dept: OBGYN | Facility: CLINIC | Age: 40
End: 2020-08-25

## 2020-08-25 DIAGNOSIS — Z11.59 ENCOUNTER FOR SCREENING FOR OTHER VIRAL DISEASES: Primary | ICD-10-CM

## 2020-08-25 PROBLEM — O30.049 DICHORIONIC DIAMNIOTIC TWIN PREGNANCY, ANTEPARTUM: Status: ACTIVE | Noted: 2020-08-25

## 2020-08-25 NOTE — TELEPHONE ENCOUNTER
Type of surgery: PRIMARY CS  Location of surgery: [facility]  Date and time of surgery: 10/22/20 11a  Surgeon: Sukumar slater/Jules to Assist  Pre-Op Appt Date: TBD  Post-Op Appt Date: TBD   Packet sent out: MAILED 8/25/2020  Pre-cert/Authorization completed:  TBD  Date: 8/25/2020    Kaitlynn Betancur  Surgery Scheduler      Additional Instructions for the Case  Schedule Oct 22 Thursday with Jules assist if available  Spinal anesthesia   Labor and delivery  No preop  9am if available

## 2020-09-03 ENCOUNTER — OFFICE VISIT (OUTPATIENT)
Dept: MATERNAL FETAL MEDICINE | Facility: CLINIC | Age: 40
End: 2020-09-03
Attending: OBSTETRICS & GYNECOLOGY
Payer: COMMERCIAL

## 2020-09-03 ENCOUNTER — HOSPITAL ENCOUNTER (OUTPATIENT)
Dept: ULTRASOUND IMAGING | Facility: CLINIC | Age: 40
End: 2020-09-03
Attending: OBSTETRICS & GYNECOLOGY
Payer: COMMERCIAL

## 2020-09-03 DIAGNOSIS — O30.043 DICHORIONIC DIAMNIOTIC TWIN PREGNANCY IN THIRD TRIMESTER: Primary | ICD-10-CM

## 2020-09-03 DIAGNOSIS — O30.042 DICHORIONIC DIAMNIOTIC TWIN PREGNANCY IN SECOND TRIMESTER: ICD-10-CM

## 2020-09-03 PROCEDURE — 76816 OB US FOLLOW-UP PER FETUS: CPT | Mod: 59

## 2020-09-03 NOTE — PROGRESS NOTES
Please see ultrasound report under Imaging tab for details of today's ultrasound.    Donita Garcia MD  Maternal-Fetal Medicine

## 2020-09-08 ENCOUNTER — PRENATAL OFFICE VISIT (OUTPATIENT)
Dept: OBGYN | Facility: CLINIC | Age: 40
End: 2020-09-08
Payer: COMMERCIAL

## 2020-09-08 VITALS — DIASTOLIC BLOOD PRESSURE: 68 MMHG | WEIGHT: 242.6 LBS | SYSTOLIC BLOOD PRESSURE: 118 MMHG | BODY MASS INDEX: 38 KG/M2

## 2020-09-08 DIAGNOSIS — O09.93 SUPERVISION OF HIGH RISK PREGNANCY IN THIRD TRIMESTER: ICD-10-CM

## 2020-09-08 DIAGNOSIS — O30.049 DICHORIONIC DIAMNIOTIC TWIN PREGNANCY, ANTEPARTUM: Primary | ICD-10-CM

## 2020-09-08 PROCEDURE — 99207 ZZC PRENATAL VISIT: CPT | Performed by: OBSTETRICS & GYNECOLOGY

## 2020-09-08 NOTE — PROGRESS NOTES
Both babies vertex at last us  Another growth scan in 4 wks  nathalie told her no bpps are needed  See her weekly here

## 2020-09-16 ENCOUNTER — TELEPHONE (OUTPATIENT)
Dept: OBGYN | Facility: CLINIC | Age: 40
End: 2020-09-16

## 2020-09-16 ENCOUNTER — HOSPITAL ENCOUNTER (INPATIENT)
Facility: CLINIC | Age: 40
LOS: 4 days | Discharge: HOME | End: 2020-09-20
Attending: OBSTETRICS & GYNECOLOGY | Admitting: OBSTETRICS & GYNECOLOGY
Payer: COMMERCIAL

## 2020-09-16 ENCOUNTER — ANESTHESIA (OUTPATIENT)
Dept: OBGYN | Facility: CLINIC | Age: 40
End: 2020-09-16
Payer: COMMERCIAL

## 2020-09-16 ENCOUNTER — NURSE TRIAGE (OUTPATIENT)
Dept: NURSING | Facility: CLINIC | Age: 40
End: 2020-09-16

## 2020-09-16 ENCOUNTER — ANESTHESIA EVENT (OUTPATIENT)
Dept: OBGYN | Facility: CLINIC | Age: 40
End: 2020-09-16
Payer: COMMERCIAL

## 2020-09-16 DIAGNOSIS — O09.93 SUPERVISION OF HIGH RISK PREGNANCY IN THIRD TRIMESTER: Primary | ICD-10-CM

## 2020-09-16 DIAGNOSIS — O30.049 DICHORIONIC DIAMNIOTIC TWIN PREGNANCY, ANTEPARTUM: ICD-10-CM

## 2020-09-16 PROBLEM — O60.00 PRETERM LABOR: Status: ACTIVE | Noted: 2020-09-16

## 2020-09-16 LAB
ABO + RH BLD: NORMAL
ABO + RH BLD: NORMAL
BASOPHILS # BLD AUTO: 0 10E9/L (ref 0–0.2)
BASOPHILS NFR BLD AUTO: 0.1 %
BLD GP AB SCN SERPL QL: NORMAL
BLOOD BANK CMNT PATIENT-IMP: NORMAL
DIFFERENTIAL METHOD BLD: ABNORMAL
EOSINOPHIL # BLD AUTO: 0 10E9/L (ref 0–0.7)
EOSINOPHIL NFR BLD AUTO: 0.5 %
ERYTHROCYTE [DISTWIDTH] IN BLOOD BY AUTOMATED COUNT: 13.2 % (ref 10–15)
GP B STREP DNA SPEC QL NAA+PROBE: NEGATIVE
HCT VFR BLD AUTO: 35.6 % (ref 35–47)
HGB BLD-MCNC: 11.6 G/DL (ref 11.7–15.7)
IMM GRANULOCYTES # BLD: 0.1 10E9/L (ref 0–0.4)
IMM GRANULOCYTES NFR BLD: 1 %
LABORATORY COMMENT REPORT: NORMAL
LYMPHOCYTES # BLD AUTO: 1.3 10E9/L (ref 0.8–5.3)
LYMPHOCYTES NFR BLD AUTO: 15.5 %
MCH RBC QN AUTO: 28.1 PG (ref 26.5–33)
MCHC RBC AUTO-ENTMCNC: 33.2 G/DL (ref 31.5–36.5)
MCV RBC AUTO: 87 FL (ref 78–100)
MONOCYTES # BLD AUTO: 0.7 10E9/L (ref 0–1.3)
MONOCYTES NFR BLD AUTO: 8.4 %
NEUTROPHILS # BLD AUTO: 6.1 10E9/L (ref 1.6–8.3)
NEUTROPHILS NFR BLD AUTO: 74.5 %
NRBC # BLD AUTO: 0 10*3/UL
NRBC BLD AUTO-RTO: 0 /100
PLATELET # BLD AUTO: 124 10E9/L (ref 150–450)
RBC # BLD AUTO: 4.09 10E12/L (ref 3.8–5.2)
RUPTURE OF FETAL MEMBRANES BY ROM PLUS: POSITIVE
SARS-COV-2 RNA SPEC QL NAA+PROBE: NEGATIVE
SARS-COV-2 RNA SPEC QL NAA+PROBE: NORMAL
SPECIMEN EXP DATE BLD: NORMAL
SPECIMEN SOURCE: NORMAL
T PALLIDUM AB SER QL: NONREACTIVE
WBC # BLD AUTO: 8.2 10E9/L (ref 4–11)

## 2020-09-16 PROCEDURE — G0463 HOSPITAL OUTPT CLINIC VISIT: HCPCS | Mod: 25

## 2020-09-16 PROCEDURE — 25800030 ZZH RX IP 258 OP 636: Performed by: NURSE ANESTHETIST, CERTIFIED REGISTERED

## 2020-09-16 PROCEDURE — 36000058 ZZH SURGERY LEVEL 3 EA 15 ADDTL MIN: Performed by: OBSTETRICS & GYNECOLOGY

## 2020-09-16 PROCEDURE — 71000013 ZZH RECOVERY PHASE 1 LEVEL 1 EA ADDTL HR: Performed by: OBSTETRICS & GYNECOLOGY

## 2020-09-16 PROCEDURE — 96372 THER/PROPH/DIAG INJ SC/IM: CPT

## 2020-09-16 PROCEDURE — U0003 INFECTIOUS AGENT DETECTION BY NUCLEIC ACID (DNA OR RNA); SEVERE ACUTE RESPIRATORY SYNDROME CORONAVIRUS 2 (SARS-COV-2) (CORONAVIRUS DISEASE [COVID-19]), AMPLIFIED PROBE TECHNIQUE, MAKING USE OF HIGH THROUGHPUT TECHNOLOGIES AS DESCRIBED BY CMS-2020-01-R: HCPCS | Performed by: OBSTETRICS & GYNECOLOGY

## 2020-09-16 PROCEDURE — 86780 TREPONEMA PALLIDUM: CPT | Performed by: OBSTETRICS & GYNECOLOGY

## 2020-09-16 PROCEDURE — 25800030 ZZH RX IP 258 OP 636: Performed by: OBSTETRICS & GYNECOLOGY

## 2020-09-16 PROCEDURE — 86901 BLOOD TYPING SEROLOGIC RH(D): CPT | Performed by: OBSTETRICS & GYNECOLOGY

## 2020-09-16 PROCEDURE — 27210794 ZZH OR GENERAL SUPPLY STERILE: Performed by: OBSTETRICS & GYNECOLOGY

## 2020-09-16 PROCEDURE — 25000125 ZZHC RX 250: Performed by: NURSE ANESTHETIST, CERTIFIED REGISTERED

## 2020-09-16 PROCEDURE — 36415 COLL VENOUS BLD VENIPUNCTURE: CPT | Performed by: OBSTETRICS & GYNECOLOGY

## 2020-09-16 PROCEDURE — 25000128 H RX IP 250 OP 636: Performed by: OBSTETRICS & GYNECOLOGY

## 2020-09-16 PROCEDURE — C9803 HOPD COVID-19 SPEC COLLECT: HCPCS

## 2020-09-16 PROCEDURE — 25000132 ZZH RX MED GY IP 250 OP 250 PS 637

## 2020-09-16 PROCEDURE — 25000125 ZZHC RX 250: Performed by: OBSTETRICS & GYNECOLOGY

## 2020-09-16 PROCEDURE — 25000132 ZZH RX MED GY IP 250 OP 250 PS 637: Performed by: OBSTETRICS & GYNECOLOGY

## 2020-09-16 PROCEDURE — 37000009 ZZH ANESTHESIA TECHNICAL FEE, EACH ADDTL 15 MIN: Performed by: OBSTETRICS & GYNECOLOGY

## 2020-09-16 PROCEDURE — 88307 TISSUE EXAM BY PATHOLOGIST: CPT | Mod: 26 | Performed by: OBSTETRICS & GYNECOLOGY

## 2020-09-16 PROCEDURE — 86900 BLOOD TYPING SEROLOGIC ABO: CPT | Performed by: OBSTETRICS & GYNECOLOGY

## 2020-09-16 PROCEDURE — 59510 CESAREAN DELIVERY: CPT | Mod: 22 | Performed by: OBSTETRICS & GYNECOLOGY

## 2020-09-16 PROCEDURE — 25000128 H RX IP 250 OP 636

## 2020-09-16 PROCEDURE — 12000035 ZZH R&B POSTPARTUM

## 2020-09-16 PROCEDURE — 36000056 ZZH SURGERY LEVEL 3 1ST 30 MIN: Performed by: OBSTETRICS & GYNECOLOGY

## 2020-09-16 PROCEDURE — 25000128 H RX IP 250 OP 636: Performed by: NURSE ANESTHETIST, CERTIFIED REGISTERED

## 2020-09-16 PROCEDURE — 59025 FETAL NON-STRESS TEST: CPT | Mod: 59

## 2020-09-16 PROCEDURE — 59025 FETAL NON-STRESS TEST: CPT

## 2020-09-16 PROCEDURE — 87653 STREP B DNA AMP PROBE: CPT | Performed by: OBSTETRICS & GYNECOLOGY

## 2020-09-16 PROCEDURE — 85025 COMPLETE CBC W/AUTO DIFF WBC: CPT | Performed by: OBSTETRICS & GYNECOLOGY

## 2020-09-16 PROCEDURE — 84112 EVAL AMNIOTIC FLUID PROTEIN: CPT | Performed by: OBSTETRICS & GYNECOLOGY

## 2020-09-16 PROCEDURE — 71000012 ZZH RECOVERY PHASE 1 LEVEL 1 FIRST HR: Performed by: OBSTETRICS & GYNECOLOGY

## 2020-09-16 PROCEDURE — 88307 TISSUE EXAM BY PATHOLOGIST: CPT | Performed by: OBSTETRICS & GYNECOLOGY

## 2020-09-16 PROCEDURE — 86850 RBC ANTIBODY SCREEN: CPT | Performed by: OBSTETRICS & GYNECOLOGY

## 2020-09-16 PROCEDURE — 37000008 ZZH ANESTHESIA TECHNICAL FEE, 1ST 30 MIN: Performed by: OBSTETRICS & GYNECOLOGY

## 2020-09-16 RX ORDER — KETOROLAC TROMETHAMINE 30 MG/ML
INJECTION, SOLUTION INTRAMUSCULAR; INTRAVENOUS
Status: DISCONTINUED
Start: 2020-09-16 | End: 2020-09-16 | Stop reason: HOSPADM

## 2020-09-16 RX ORDER — PENICILLIN G POTASSIUM 5000000 [IU]/1
5 INJECTION, POWDER, FOR SOLUTION INTRAMUSCULAR; INTRAVENOUS ONCE
Status: COMPLETED | OUTPATIENT
Start: 2020-09-16 | End: 2020-09-16

## 2020-09-16 RX ORDER — LIDOCAINE 40 MG/G
CREAM TOPICAL
Status: DISCONTINUED | OUTPATIENT
Start: 2020-09-16 | End: 2020-09-20 | Stop reason: HOSPADM

## 2020-09-16 RX ORDER — OXYCODONE HYDROCHLORIDE 5 MG/1
5 TABLET ORAL EVERY 4 HOURS PRN
Status: DISCONTINUED | OUTPATIENT
Start: 2020-09-16 | End: 2020-09-17

## 2020-09-16 RX ORDER — AMOXICILLIN 250 MG
2 CAPSULE ORAL 2 TIMES DAILY
Status: DISCONTINUED | OUTPATIENT
Start: 2020-09-16 | End: 2020-09-20 | Stop reason: HOSPADM

## 2020-09-16 RX ORDER — BUPIVACAINE HYDROCHLORIDE 7.5 MG/ML
INJECTION, SOLUTION INTRASPINAL PRN
Status: DISCONTINUED | OUTPATIENT
Start: 2020-09-16 | End: 2020-09-16

## 2020-09-16 RX ORDER — ONDANSETRON 2 MG/ML
4 INJECTION INTRAMUSCULAR; INTRAVENOUS EVERY 6 HOURS PRN
Status: DISCONTINUED | OUTPATIENT
Start: 2020-09-16 | End: 2020-09-16

## 2020-09-16 RX ORDER — IBUPROFEN 400 MG/1
800 TABLET, FILM COATED ORAL EVERY 6 HOURS PRN
Status: DISCONTINUED | OUTPATIENT
Start: 2020-09-17 | End: 2020-09-20 | Stop reason: HOSPADM

## 2020-09-16 RX ORDER — OXYTOCIN 10 [USP'U]/ML
10 INJECTION, SOLUTION INTRAMUSCULAR; INTRAVENOUS
Status: DISCONTINUED | OUTPATIENT
Start: 2020-09-16 | End: 2020-09-20 | Stop reason: HOSPADM

## 2020-09-16 RX ORDER — NALOXONE HYDROCHLORIDE 0.4 MG/ML
.1-.4 INJECTION, SOLUTION INTRAMUSCULAR; INTRAVENOUS; SUBCUTANEOUS
Status: DISCONTINUED | OUTPATIENT
Start: 2020-09-16 | End: 2020-09-16

## 2020-09-16 RX ORDER — BETAMETHASONE SODIUM PHOSPHATE AND BETAMETHASONE ACETATE 3; 3 MG/ML; MG/ML
12 INJECTION, SUSPENSION INTRA-ARTICULAR; INTRALESIONAL; INTRAMUSCULAR; SOFT TISSUE ONCE
Status: COMPLETED | OUTPATIENT
Start: 2020-09-16 | End: 2020-09-16

## 2020-09-16 RX ORDER — ONDANSETRON 4 MG/1
4 TABLET, ORALLY DISINTEGRATING ORAL EVERY 6 HOURS PRN
Status: DISCONTINUED | OUTPATIENT
Start: 2020-09-16 | End: 2020-09-16

## 2020-09-16 RX ORDER — AZITHROMYCIN 500 MG/1
INJECTION, POWDER, LYOPHILIZED, FOR SOLUTION INTRAVENOUS
Status: DISCONTINUED
Start: 2020-09-16 | End: 2020-09-16 | Stop reason: HOSPADM

## 2020-09-16 RX ORDER — HYDROMORPHONE HYDROCHLORIDE 1 MG/ML
.3-.5 INJECTION, SOLUTION INTRAMUSCULAR; INTRAVENOUS; SUBCUTANEOUS EVERY 30 MIN PRN
Status: DISCONTINUED | OUTPATIENT
Start: 2020-09-16 | End: 2020-09-20 | Stop reason: HOSPADM

## 2020-09-16 RX ORDER — NALBUPHINE HYDROCHLORIDE 10 MG/ML
2.5-5 INJECTION, SOLUTION INTRAMUSCULAR; INTRAVENOUS; SUBCUTANEOUS EVERY 6 HOURS PRN
Status: DISCONTINUED | OUTPATIENT
Start: 2020-09-16 | End: 2020-09-16

## 2020-09-16 RX ORDER — NALOXONE HYDROCHLORIDE 0.4 MG/ML
.1-.4 INJECTION, SOLUTION INTRAMUSCULAR; INTRAVENOUS; SUBCUTANEOUS
Status: DISCONTINUED | OUTPATIENT
Start: 2020-09-16 | End: 2020-09-20 | Stop reason: HOSPADM

## 2020-09-16 RX ORDER — ACETAMINOPHEN 325 MG/1
975 TABLET ORAL EVERY 6 HOURS
Status: COMPLETED | OUTPATIENT
Start: 2020-09-16 | End: 2020-09-19

## 2020-09-16 RX ORDER — OXYTOCIN/0.9 % SODIUM CHLORIDE 30/500 ML
340 PLASTIC BAG, INJECTION (ML) INTRAVENOUS CONTINUOUS PRN
Status: DISCONTINUED | OUTPATIENT
Start: 2020-09-16 | End: 2020-09-20 | Stop reason: HOSPADM

## 2020-09-16 RX ORDER — PENICILLIN G POTASSIUM 5000000 [IU]/1
INJECTION, POWDER, FOR SOLUTION INTRAMUSCULAR; INTRAVENOUS
Status: DISCONTINUED
Start: 2020-09-16 | End: 2020-09-16 | Stop reason: HOSPADM

## 2020-09-16 RX ORDER — ACETAMINOPHEN 325 MG/1
650 TABLET ORAL EVERY 4 HOURS PRN
Status: DISCONTINUED | OUTPATIENT
Start: 2020-09-19 | End: 2020-09-20 | Stop reason: HOSPADM

## 2020-09-16 RX ORDER — CEFAZOLIN SODIUM 2 G/100ML
2 INJECTION, SOLUTION INTRAVENOUS
Status: COMPLETED | OUTPATIENT
Start: 2020-09-16 | End: 2020-09-16

## 2020-09-16 RX ORDER — AMOXICILLIN 250 MG
1 CAPSULE ORAL 2 TIMES DAILY
Status: DISCONTINUED | OUTPATIENT
Start: 2020-09-16 | End: 2020-09-20 | Stop reason: HOSPADM

## 2020-09-16 RX ORDER — TRANEXAMIC ACID 10 MG/ML
1 INJECTION, SOLUTION INTRAVENOUS EVERY 30 MIN PRN
Status: DISCONTINUED | OUTPATIENT
Start: 2020-09-16 | End: 2020-09-20 | Stop reason: HOSPADM

## 2020-09-16 RX ORDER — CITRIC ACID/SODIUM CITRATE 334-500MG
SOLUTION, ORAL ORAL
Status: COMPLETED
Start: 2020-09-16 | End: 2020-09-16

## 2020-09-16 RX ORDER — OXYTOCIN/0.9 % SODIUM CHLORIDE 30/500 ML
100 PLASTIC BAG, INJECTION (ML) INTRAVENOUS CONTINUOUS
Status: DISCONTINUED | OUTPATIENT
Start: 2020-09-16 | End: 2020-09-20 | Stop reason: HOSPADM

## 2020-09-16 RX ORDER — CITRIC ACID/SODIUM CITRATE 334-500MG
30 SOLUTION, ORAL ORAL
Status: COMPLETED | OUTPATIENT
Start: 2020-09-16 | End: 2020-09-16

## 2020-09-16 RX ORDER — BISACODYL 10 MG
10 SUPPOSITORY, RECTAL RECTAL DAILY PRN
Status: DISCONTINUED | OUTPATIENT
Start: 2020-09-18 | End: 2020-09-20 | Stop reason: HOSPADM

## 2020-09-16 RX ORDER — DEXTROSE, SODIUM CHLORIDE, SODIUM LACTATE, POTASSIUM CHLORIDE, AND CALCIUM CHLORIDE 5; .6; .31; .03; .02 G/100ML; G/100ML; G/100ML; G/100ML; G/100ML
INJECTION, SOLUTION INTRAVENOUS CONTINUOUS
Status: DISCONTINUED | OUTPATIENT
Start: 2020-09-16 | End: 2020-09-20 | Stop reason: HOSPADM

## 2020-09-16 RX ORDER — SIMETHICONE 80 MG
80 TABLET,CHEWABLE ORAL 4 TIMES DAILY PRN
Status: DISCONTINUED | OUTPATIENT
Start: 2020-09-16 | End: 2020-09-20 | Stop reason: HOSPADM

## 2020-09-16 RX ORDER — CEFAZOLIN SODIUM 1 G/3ML
1 INJECTION, POWDER, FOR SOLUTION INTRAMUSCULAR; INTRAVENOUS SEE ADMIN INSTRUCTIONS
Status: DISCONTINUED | OUTPATIENT
Start: 2020-09-16 | End: 2020-09-16

## 2020-09-16 RX ORDER — HYDROCORTISONE 2.5 %
CREAM (GRAM) TOPICAL 3 TIMES DAILY PRN
Status: DISCONTINUED | OUTPATIENT
Start: 2020-09-16 | End: 2020-09-20 | Stop reason: HOSPADM

## 2020-09-16 RX ORDER — IBUPROFEN 400 MG/1
800 TABLET, FILM COATED ORAL EVERY 6 HOURS PRN
Status: DISCONTINUED | OUTPATIENT
Start: 2020-09-16 | End: 2020-09-16

## 2020-09-16 RX ORDER — OXYTOCIN/0.9 % SODIUM CHLORIDE 30/500 ML
PLASTIC BAG, INJECTION (ML) INTRAVENOUS CONTINUOUS PRN
Status: DISCONTINUED | OUTPATIENT
Start: 2020-09-16 | End: 2020-09-16

## 2020-09-16 RX ORDER — LIDOCAINE 40 MG/G
CREAM TOPICAL
Status: DISCONTINUED | OUTPATIENT
Start: 2020-09-16 | End: 2020-09-16

## 2020-09-16 RX ORDER — CEFAZOLIN SODIUM 2 G/100ML
INJECTION, SOLUTION INTRAVENOUS
Status: DISCONTINUED
Start: 2020-09-16 | End: 2020-09-16 | Stop reason: HOSPADM

## 2020-09-16 RX ORDER — MORPHINE SULFATE 1 MG/ML
150 INJECTION, SOLUTION EPIDURAL; INTRATHECAL; INTRAVENOUS ONCE
Status: DISCONTINUED | OUTPATIENT
Start: 2020-09-16 | End: 2020-09-16

## 2020-09-16 RX ORDER — AZITHROMYCIN 500 MG/1
500 INJECTION, POWDER, LYOPHILIZED, FOR SOLUTION INTRAVENOUS
Status: COMPLETED | OUTPATIENT
Start: 2020-09-16 | End: 2020-09-16

## 2020-09-16 RX ORDER — ACETAMINOPHEN 325 MG/1
TABLET ORAL
Status: DISCONTINUED
Start: 2020-09-16 | End: 2020-09-16 | Stop reason: HOSPADM

## 2020-09-16 RX ORDER — MORPHINE SULFATE 1 MG/ML
INJECTION, SOLUTION EPIDURAL; INTRATHECAL; INTRAVENOUS PRN
Status: DISCONTINUED | OUTPATIENT
Start: 2020-09-16 | End: 2020-09-16

## 2020-09-16 RX ORDER — ONDANSETRON 2 MG/ML
INJECTION INTRAMUSCULAR; INTRAVENOUS PRN
Status: DISCONTINUED | OUTPATIENT
Start: 2020-09-16 | End: 2020-09-16

## 2020-09-16 RX ORDER — ONDANSETRON 2 MG/ML
4 INJECTION INTRAMUSCULAR; INTRAVENOUS EVERY 6 HOURS PRN
Status: DISCONTINUED | OUTPATIENT
Start: 2020-09-16 | End: 2020-09-20 | Stop reason: HOSPADM

## 2020-09-16 RX ORDER — SODIUM CHLORIDE, SODIUM LACTATE, POTASSIUM CHLORIDE, CALCIUM CHLORIDE 600; 310; 30; 20 MG/100ML; MG/100ML; MG/100ML; MG/100ML
INJECTION, SOLUTION INTRAVENOUS CONTINUOUS
Status: DISCONTINUED | OUTPATIENT
Start: 2020-09-16 | End: 2020-09-16

## 2020-09-16 RX ORDER — KETOROLAC TROMETHAMINE 30 MG/ML
30 INJECTION, SOLUTION INTRAMUSCULAR; INTRAVENOUS EVERY 6 HOURS
Status: COMPLETED | OUTPATIENT
Start: 2020-09-16 | End: 2020-09-17

## 2020-09-16 RX ORDER — MODIFIED LANOLIN
OINTMENT (GRAM) TOPICAL
Status: DISCONTINUED | OUTPATIENT
Start: 2020-09-16 | End: 2020-09-20 | Stop reason: HOSPADM

## 2020-09-16 RX ORDER — BETAMETHASONE SODIUM PHOSPHATE AND BETAMETHASONE ACETATE 3; 3 MG/ML; MG/ML
INJECTION, SUSPENSION INTRA-ARTICULAR; INTRALESIONAL; INTRAMUSCULAR; SOFT TISSUE
Status: COMPLETED
Start: 2020-09-16 | End: 2020-09-16

## 2020-09-16 RX ADMIN — MORPHINE SULFATE 0.15 MG: 1 INJECTION, SOLUTION EPIDURAL; INTRATHECAL; INTRAVENOUS at 04:24

## 2020-09-16 RX ADMIN — Medication 100 ML/HR: at 08:27

## 2020-09-16 RX ADMIN — DOCUSATE SODIUM 50 MG AND SENNOSIDES 8.6 MG 1 TABLET: 8.6; 5 TABLET, FILM COATED ORAL at 20:29

## 2020-09-16 RX ADMIN — KETOROLAC TROMETHAMINE 30 MG: 30 INJECTION, SOLUTION INTRAMUSCULAR at 19:17

## 2020-09-16 RX ADMIN — BETAMETHASONE SODIUM PHOSPHATE AND BETAMETHASONE ACETATE 12 MG: 3; 3 INJECTION, SUSPENSION INTRA-ARTICULAR; INTRALESIONAL; INTRAMUSCULAR; SOFT TISSUE at 02:45

## 2020-09-16 RX ADMIN — PENICILLIN G POTASSIUM 5 MILLION UNITS: 5000000 INJECTION, POWDER, FOR SOLUTION INTRAMUSCULAR; INTRAVENOUS at 03:43

## 2020-09-16 RX ADMIN — SERTRALINE HYDROCHLORIDE 50 MG: 50 TABLET ORAL at 20:29

## 2020-09-16 RX ADMIN — SODIUM CHLORIDE, POTASSIUM CHLORIDE, SODIUM LACTATE AND CALCIUM CHLORIDE: 600; 310; 30; 20 INJECTION, SOLUTION INTRAVENOUS at 03:42

## 2020-09-16 RX ADMIN — KETOROLAC TROMETHAMINE 30 MG: 30 INJECTION, SOLUTION INTRAMUSCULAR at 06:29

## 2020-09-16 RX ADMIN — ACETAMINOPHEN 975 MG: 325 TABLET, FILM COATED ORAL at 19:16

## 2020-09-16 RX ADMIN — SODIUM CHLORIDE, SODIUM LACTATE, POTASSIUM CHLORIDE, CALCIUM CHLORIDE AND DEXTROSE MONOHYDRATE 125 ML: 5; 600; 310; 30; 20 INJECTION, SOLUTION INTRAVENOUS at 14:13

## 2020-09-16 RX ADMIN — BUPIVACAINE HYDROCHLORIDE IN DEXTROSE 12 MG: 7.5 INJECTION, SOLUTION SUBARACHNOID at 04:24

## 2020-09-16 RX ADMIN — Medication 340 ML/HR: at 04:43

## 2020-09-16 RX ADMIN — ACETAMINOPHEN 975 MG: 325 TABLET, FILM COATED ORAL at 06:29

## 2020-09-16 RX ADMIN — PENICILLIN G POTASSIUM 5 MILLION UNITS: 5000000 POWDER, FOR SOLUTION INTRAMUSCULAR; INTRAPLEURAL; INTRATHECAL; INTRAVENOUS at 03:43

## 2020-09-16 RX ADMIN — SODIUM CHLORIDE, POTASSIUM CHLORIDE, SODIUM LACTATE AND CALCIUM CHLORIDE: 600; 310; 30; 20 INJECTION, SOLUTION INTRAVENOUS at 04:54

## 2020-09-16 RX ADMIN — PHENYLEPHRINE HYDROCHLORIDE 0.5 MCG/KG/MIN: 10 INJECTION INTRAVENOUS at 04:24

## 2020-09-16 RX ADMIN — SODIUM CITRATE AND CITRIC ACID MONOHYDRATE 30 ML: 500; 334 SOLUTION ORAL at 03:44

## 2020-09-16 RX ADMIN — CEFAZOLIN SODIUM 2 G: 2 INJECTION, SOLUTION INTRAVENOUS at 04:20

## 2020-09-16 RX ADMIN — KETOROLAC TROMETHAMINE 30 MG: 30 INJECTION, SOLUTION INTRAMUSCULAR at 12:23

## 2020-09-16 RX ADMIN — PHENYLEPHRINE HYDROCHLORIDE 100 MCG: 10 INJECTION INTRAVENOUS at 04:28

## 2020-09-16 RX ADMIN — ACETAMINOPHEN 975 MG: 325 TABLET, FILM COATED ORAL at 12:21

## 2020-09-16 RX ADMIN — ONDANSETRON 4 MG: 2 INJECTION INTRAMUSCULAR; INTRAVENOUS at 04:45

## 2020-09-16 RX ADMIN — AZITHROMYCIN MONOHYDRATE 500 MG: 500 INJECTION, POWDER, LYOPHILIZED, FOR SOLUTION INTRAVENOUS at 04:30

## 2020-09-16 RX ADMIN — Medication 30 ML: at 03:44

## 2020-09-16 RX ADMIN — PHENYLEPHRINE HYDROCHLORIDE 150 MCG: 10 INJECTION INTRAVENOUS at 04:39

## 2020-09-16 SDOH — HEALTH STABILITY: MENTAL HEALTH: CURRENT SMOKER: 0

## 2020-09-16 ASSESSMENT — LIFESTYLE VARIABLES: TOBACCO_USE: 0

## 2020-09-16 NOTE — OP NOTE
Procedure Date: 2020      PROCEDURE:  Primary low segment transverse  section.      PREOPERATIVE DIAGNOSES:  Twin gestation 33+3 weeks, spontaneous rupture of membranes,  labor.      SURGEON:  Moisés Starr MD.      ANESTHESIA:  Spinal.      ESTIMATED BLOOD LOSS:  150 mL.      COMPLICATIONS:  None.      FINDINGS:  Twin A male, vertex presentation, Apgars of 7 and 7, weighing 5 pounds 5 ounces.  Twin B, vertex presentation, female, Apgars of 5 and 7, weighing 4 pounds 11 ounces.  Normal ovaries, uterus and tubes.      INDICATIONS FOR PROCEDURE:  The patient is a 40-year-old para 1-0-0-1, who is at 33+3 weeks gestation.  The patient's pregnancy has been uncomplicated.  The patient called with complaints of spontaneous rupture of membranes.  She arrived fer and was found to be 4 cm.  The patient received 1 dose of steroids and penicillin.  The risks and benefits of different delivery modalities were discussed and the patient and  wished to proceed with  section delivery.      DESCRIPTION OF PROCEDURE:  The patient was brought to the operating room, spinal anesthesia was given.  She was then prepped and draped in the usual fashion.  Pfannenstiel skin incision was made 2 fingerbreadths above the pubic symphysis and carried down through the subcutaneous tissue.  The rest of the subcutaneous tissue was done with cautery.  The fascia was scored on both sides of the midline and the fascia was bluntly opened.  The rectus abdominis muscle was  in the midline bluntly.  Peritoneum entered bluntly and the bladder retracted away.  The vesicouterine peritoneum was then scored with the bladder dissected slightly down the lower uterine segment.  Lower uterine segment was then started sharply and then finished bluntly.  The incision opened bluntly.  The twin A head was delivered without difficulty.  Mouth and nares were bulb suctioned.  Baby was delivered onto the maternal  abdomen.  Delayed cord clamping was performed.  This was doubly clamped and cut with a 3-vessel cord noted.  Twin B was then brought forth to the incision area.  Membranes were ruptured.  Vertex was delivered without difficulty.  Mouth and nares were bulb suctioned.  Baby was not as responsive as twin A; therefore, delayed cord clamping was not performed.  This was doubly clamped and cut and 3-vessel cord noted.  Baby was passed off to the awaiting nursery team.  Placenta was delivered manually and intact.  The uterus contracted quite well.  Uterine incision was repaired with a running locking suture of 0 PDS and then an imbricating layer of 0 PDS with excellent hemostasis.  Peritoneum reapproximated with a running suture of 3-0 Vicryl.  Fascia reapproximated with a running suture of 0 Vicryl.  Subcutaneous tissue was irrigated.  There were no bleeders noted.  Therefore, the skin was reapproximated with a running subcuticular suture of 4-0 Monocryl.  Final needle and sponge count was correct.  The patient was transferred from the operating room to the recovery room in stable condition.         SANTIAGO JUAN MD             D: 2020   T: 2020   MT: EDGAR      Name:     SALLIE GARCÍA   MRN:      [id number]        Account:        [id number]   :      1980           Procedure Date: 2020      Document: [id number]

## 2020-09-16 NOTE — PROVIDER NOTIFICATION
09/16/20 0304   Provider Notification   Provider Name/Title Dr. Starr   Method of Notification At Bedside   Request Evaluate in Person   Notification Reason Status Update   Dr. Starr to evaluate and perform bedside ultrasound.

## 2020-09-16 NOTE — PLAN OF CARE
Pt. arived on floor at 7040, IV Pitocin infusing, pneumoboots in place. Polanco draining clear, yellow urine. Fundus firm, scant flow. Serosanguinous drainage present on left side of drsg. Dr. Starr aware and stated RN may reinforce if needed. Pt. pumping every 3 hrs and yielding 2-3 ml of colostrum. Tolerating reg. diet. Assisted up to BR this afternoon, nabil care done. Wearing abd. binder, assisted pt. To NICU by WC to see her twins.

## 2020-09-16 NOTE — ANESTHESIA PREPROCEDURE EVALUATION
Anesthesia Pre-Procedure Evaluation    Patient: Yulisa Rai   MRN: [id number] : 1980          Preoperative Diagnosis: Dichorionic diamniotic twin pregnancy, antepartum [O30.049]    Procedure(s):  PRIMARY  SECTION    Past Medical History:   Diagnosis Date     Abnormal Pap smear of cervix     see problem list     Bunions     shireen     Cervical high risk HPV (human papillomavirus) test positive     see problem list     Cyst of Bartholin's gland     s/p word catheter placement     BRIJESH (generalised anxiety disorder)     celexa     Herpes simplex without mention of complication     cold sores     Hx musculoskletl dis NEC     rt thoracic back pain/tingling     Intermittent asthma     excercise induced-mild     Past Surgical History:   Procedure Laterality Date     C NONSPECIFIC PROCEDURE      wisdom teeth extraction     C NONSPECIFIC PROCEDURE      rt. wrist fx     C NONSPECIFIC PROCEDURE  , 3/07    ASCUS, lgsil     C NONSPECIFIC PROCEDURE  3/03, 3/07    colposcopy     D & C  ,      GYN SURGERY  2017    ibroid removed     HEAD & NECK SURGERY      wisdom teeth     HYSTEROSCOPY, ABLATE ENDOMETRIUM VERSAPOINT , COMBINED N/A 2017    Procedure: COMBINED HYSTEROSCOPY, ABLATE ENDOMETRIUM VERSAPOINT;  HYSTEROSCOPY WITH VERSA POINT MYOMECTOMY x 1, MULTIPLE POLYPECTOMIES ; LAPAROSCOPY FOR MYOMECTOMY X2 (<5cm);  Surgeon: Josef Masters MD;  Location: [facility]     LAPAROSCOPIC MYOMECTOMY UTERUS  2017    Procedure: LAPAROSCOPIC MYOMECTOMY UTERUS;;  Surgeon: Josef Masters MD;  Location: [facility]       Anesthesia Evaluation     . Pt has had prior anesthetic.     No history of anesthetic complications          ROS/MED HX    ENT/Pulmonary:     (+)Intermittent asthma , . .   (-) tobacco use   Neurologic:       Cardiovascular:  - neg cardiovascular ROS       METS/Exercise Tolerance:  >4 METS   Hematologic: Comments: PLTs 119 in         Musculoskeletal:         GI/Hepatic:     (+) GERD   "     Renal/Genitourinary:  - ROS Renal section negative       Endo:         Psychiatric:     (+) psychiatric history anxiety      Infectious Disease:         Malignancy:         Other:                          Physical Exam  Normal systems: dental    Airway   Mallampati: III  TM distance: >3 FB  Neck ROM: full    Dental     Cardiovascular   Rhythm and rate: regular  (-) no murmur    Pulmonary    breath sounds clear to auscultation            Lab Results   Component Value Date    WBC 8.6 08/10/2020    HGB 11.9 08/10/2020    HCT 36.2 08/10/2020     (L) 08/10/2020    CR 0.54 07/27/2018    ALT 13 07/27/2018    AST 13 07/27/2018    TSH 3.48 07/16/2007    HCGS Negative 07/20/2017       Preop Vitals  BP Readings from Last 3 Encounters:   09/16/20 128/81   09/08/20 118/68   08/24/20 124/60    Pulse Readings from Last 3 Encounters:   09/16/20 126   03/18/20 78   10/15/18 72      Resp Readings from Last 3 Encounters:   09/16/20 20   09/17/18 16   07/29/18 16    SpO2 Readings from Last 3 Encounters:   09/17/18 98%   07/27/18 95%   02/28/18 98%      Temp Readings from Last 1 Encounters:   09/16/20 36.6  C (97.8  F) (Temporal)    Ht Readings from Last 1 Encounters:   03/18/20 1.702 m (5' 7\")      Wt Readings from Last 1 Encounters:   09/08/20 110 kg (242 lb 9.6 oz)    Estimated body mass index is 38 kg/m  as calculated from the following:    Height as of 3/18/20: 1.702 m (5' 7\").    Weight as of 9/8/20: 110 kg (242 lb 9.6 oz).       Anesthesia Plan      History & Physical Review  History and physical reviewed and following examination; no interval change.    ASA Status:  2 .    NPO Status:  > 8 hours    Plan for Spinal     SAB  Recheck plts   The patient is not a current smoker      Postoperative Care  Postoperative pain management:  Multi-modal analgesia.      Consents  Anesthetic plan, risks, benefits and alternatives discussed with:  Patient.  Use of blood products discussed: Yes.   Use of blood products discussed with " Patient.  Consented to blood products.  .                 Benedicto Sands MD

## 2020-09-16 NOTE — PROGRESS NOTES
"Obstetrics Brief Operative Note    Pre-operative diagnosis: Twins  33+3 wks  SROM  Labor   Post-operative diagnosis: Same   Procedure: Primary low transverse  section   Surgeon: Moisés Starr MD   Assistant(s): None   Anesthesia: Spinal anesthesia   Estimated blood loss: 150ml   Complications: None   Findings: Male  APGARS: 1 minute: 7   5 minute: 7  Weight: 5'5\".   Female APGARS 1 minute 5  5 minute 7  Weight 4'11\"  Normal ovaries, uterus,tubes     Primary OB: cremer            "

## 2020-09-16 NOTE — PROVIDER NOTIFICATION
09/16/20 0215   Provider Notification   Provider Name/Title Dr. Starr   Method of Notification Phone   Request Evaluate - Remote   Notification Reason Patient Arrived;Membrane Status;Labor Status;Uterine Activity;Pain;SVE;Status Update   Orders received for Betamethasone, GBS test and COVID test. Dr. Starr will come to evaluate in person.

## 2020-09-16 NOTE — H&P
No significant change in general health status based on examination of the patient, review of Nursing Admission Database and prenatal record.    Pt called with SROM. She is 33+3 wks with twins.  Pregnancy uncomplicated. Last ultrasound for growth 2 weeks ago.  Babies are vtx/vtx  Pt fer and breathing through ctxs. She is 4cm  Discussed C/S vs  risks and benefits.   Explained Vtx/Vtx can still be a change of second baby's position after first is delivered.   Pt and  elected to have primary .   Will have antibiotics. Already had one dose steroids.  Past Medical History:   Diagnosis Date     Abnormal Pap smear of cervix     see problem list     Bunions     shireen     Cervical high risk HPV (human papillomavirus) test positive     see problem list     Cyst of Bartholin's gland     s/p word catheter placement     BRIJESH (generalised anxiety disorder)     celexa     Herpes simplex without mention of complication     cold sores     Hx musculoskletl dis NEC     rt thoracic back pain/tingling     Intermittent asthma     excercise induced-mild     Past Surgical History:   Procedure Laterality Date     C NONSPECIFIC PROCEDURE      wisdom teeth extraction     C NONSPECIFIC PROCEDURE      rt. wrist fx     C NONSPECIFIC PROCEDURE  , 3/07    ASCUS, lgsil     C NONSPECIFIC PROCEDURE  3/03, 3/07    colposcopy     D & C  ,      GYN SURGERY  2017    ibroid removed     HEAD & NECK SURGERY      wisdom teeth     HYSTEROSCOPY, ABLATE ENDOMETRIUM VERSAPOINT , COMBINED N/A 2017    Procedure: COMBINED HYSTEROSCOPY, ABLATE ENDOMETRIUM VERSAPOINT;  HYSTEROSCOPY WITH VERSA POINT MYOMECTOMY x 1, MULTIPLE POLYPECTOMIES ; LAPAROSCOPY FOR MYOMECTOMY X2 (<5cm);  Surgeon: Josef Masters MD;  Location: [facility]     LAPAROSCOPIC MYOMECTOMY UTERUS  2017    Procedure: LAPAROSCOPIC MYOMECTOMY UTERUS;;  Surgeon: Josef Masters MD;  Location: [facility]       VSS and afebrile.  NAD  Lungs clear  Heart RRR  ABD  nontender  Bedside ultrasound shows VTX/VTX  EFM reactive    A: SROM and PTL at 33+3wks  Twin pregnancy  P: Primary

## 2020-09-16 NOTE — ANESTHESIA PROCEDURE NOTES
Procedure note : intrathecal  Staff -   Anesthesiologist:  Benedicto Sands MD      Performed By: anesthesiologist        Pre-Procedure  Performed by Benedicto Sands MD  Location: OR      Pre-Anesthestic Checklist: patient identified, IV checked, site marked, risks and benefits discussed, informed consent, monitors and equipment checked, pre-op evaluation, at physician/surgeon's request and post-op pain management    Timeout  Correct Patient: Yes   Correct Procedure: Yes   Correct Site: Yes   Correct Laterality: N/A   Correct Position: Yes   Site Marked: N/A   .   Procedure Documentation  ASA 2  .    Procedure: intrathecal, .   Patient Position:sitting Insertion Site:L4-5  (midline approach)     Patient Prep/Sterile Barriers; mask, sterile gloves, chlorhexidine gluconate and isopropyl alcohol, patient draped.  .  Needle:  Spinal Needle (gauge): 25  Spinal/LP Needle Length (inches): 3.5 Introducer used Introducer: 20 G .        Assessment/Narrative  Paresthesias: No.  .  .  clear CSF fluid removed . Comments:  Patient tolerated well.  Pre and post aspiration.  No complications.  0.75% Bupivacaine and Duramorph documented in record.

## 2020-09-16 NOTE — LACTATION NOTE
"Initial visit with Yulisa and  Kyle. Their 33 week twins are being cared for in our NICU. Yulisa has been utilizing our Medela [facility] grade pump, and has been instructed to pump every 3 hours (with a 4 hour sleep increment once in 24 hours). 24mm flange comfortable. Yulisa is using \"Initiate\" setting on the Medela pump and we discussed when to switch over to \"Maintenance Mode.\"Provided handout on \"Milk Making Reminders\" , and a pumping log with expected milk volumes through day 14. Yulisa has a hands free pumping bra.    Offered that our lactation team can continue to offer support as long as the twins are being cared for in our NICU. Suggested lots of skin to skin snuggles and allowing the twins to \"practice\" breastfeeding anytime they show early feeding cues. Reviewed early feeding cues. Discussed goals with preemie infants: 1) Feed the babies, 2) Keep being at the breast positive, and 3) preserve/maintain milk supply.    Will visit as requested.    Dayanara Gonsalez RN  Lactation Educator  "

## 2020-09-16 NOTE — TELEPHONE ENCOUNTER
"Patient is 33 weeks, 3 days pregnant with twins. States her water just broke. No contractions. Plans to deliver at [facility]. Advised L&D.     Called [facility] L&D to verify that they have a bed available. Charge nurse states they have a bed available and patient can come on in.     Notified patient and she verbalized understanding. Patient was notified that Dr Starr is on call, patient requested to speak with Dr Starr before coming in.     RN paged on call provider for [facility], Enmanuel Starr MD via smart web at 12:59 am to call patient back directly at [phone].    Radha Mar, RN/[facility] Nurse Advisors      Reason for Disposition    Leakage of fluid from vagina    Additional Information    Negative: [1] SEVERE abdominal pain (e.g., excruciating) AND [2] constant AND [3] present > 1 hour    Negative: Severe bleeding (e.g., continuous red blood from vagina, or large blood clots)    Negative: Umbilical cord hanging out of the vagina (shiny, white, curled appearance, \"like telephone cord\")    Negative: Uncontrollable urge to push (i.e., feels like baby is coming out now)    Negative: Can see baby    Negative: Sounds like a life-threatening emergency to the triager    Negative: < 20 weeks pregnant    Negative: Vaginal bleeding    Protocols used: PREGNANCY - RUPTURE OF [id number]      "

## 2020-09-16 NOTE — PLAN OF CARE
Data: Yulisa Rai transferred to 421 via bed at 0735. Pt brought to NICU to see both babies before transport to 421.  Action: Receiving unit notified of transfer: Yes. Patient and family notified of room change. Report given to Chinyere QUINTANA RN at 0740. Belongings sent to receiving unit. Accompanied by Registered Nurse. Oriented patient to surroundings. Call light within reach.   Response: Patient tolerated transfer and is stable.

## 2020-09-16 NOTE — ANESTHESIA POSTPROCEDURE EVALUATION
Patient: Yulisa Rai    Procedure(s):  PRIMARY  SECTION    Diagnosis:Dichorionic diamniotic twin pregnancy, antepartum [O30.049]  Diagnosis Additional Information: No value filed.    Anesthesia Type:  Spinal    Note:  Anesthesia Post Evaluation    Patient location during evaluation: OB PACU  Patient participation: Able to participate in evaluation but full recovery from regional anesthesia has not yet ocurrred but is anticipated to occur within 48 hours  Level of consciousness: awake and alert  Pain management: adequate  Airway patency: patent  Cardiovascular status: acceptable  Respiratory status: acceptable  Hydration status: acceptable  PONV: none     Anesthetic complications: None          Last vitals:  Vitals:    20 0230 20 0240 20 0250   BP: 122/85 127/82 128/81   Pulse:      Resp:      Temp:            Electronically Signed By: Benedicto Sands MD  2020  5:59 AM

## 2020-09-16 NOTE — PLAN OF CARE
Report given to JACKIE Cooley at 0710. Pt transferred via bed to NICU to visit infants and for updates.

## 2020-09-16 NOTE — ANESTHESIA CARE TRANSFER NOTE
Patient: Yulisa Rai    Procedure(s):  PRIMARY  SECTION    Diagnosis: Dichorionic diamniotic twin pregnancy, antepartum [O30.049]  Diagnosis Additional Information: No value filed.    Anesthesia Type:   Spinal     Note:  Airway :Room Air  Patient transferred to:Labor and Delivery  Comments: Transferred to OB PACU recovery, spontaneous respirations on room air with oxygen saturations maintained greater than 95%. SpO2, NiBP, and EKG monitors and alarms on and functioning, report on patient's clinical status given to OB recovery RN, RN questions answered, patient in hospital bed with siderails up Oxytocin 30 units in 500mL infusion connected to IV infusion pump in recovery bay and programmed to 100 mL/hr at handoff of care.  Handoff Report: Identifed the Patient, Identified the Reponsible Provider, Reviewed the pertinent medical history, Discussed the surgical course, Reviewed Intra-OP anesthesia mangement and issues during anesthesia, Set expectations for post-procedure period and Allowed opportunity for questions and acknowledgement of understanding      Vitals: (Last set prior to Anesthesia Care Transfer)    CRNA VITALS  2020 0444 - 2020 0522      2020             NIBP:  (!) 118/100    NIBP Mean:  108                Electronically Signed By: PIETRO Iverson CRNA  2020  5:22 AM

## 2020-09-16 NOTE — PLAN OF CARE
"0200:  presents at 33 3/7 weeks gestation with di di twins stating \"my water broke and now I'm having contractions.\" External  monitors applied after verbal consent by patient. Vital signs obtained, blood pressure slightly elevated. Will do serial blood pressures. Patient and spouse oriented to room, call light and plan of care while in the MAC.  "

## 2020-09-17 LAB
COPATH REPORT: NORMAL
HGB BLD-MCNC: 11.1 G/DL (ref 11.7–15.7)

## 2020-09-17 PROCEDURE — 25000132 ZZH RX MED GY IP 250 OP 250 PS 637: Performed by: OBSTETRICS & GYNECOLOGY

## 2020-09-17 PROCEDURE — 25000128 H RX IP 250 OP 636: Performed by: OBSTETRICS & GYNECOLOGY

## 2020-09-17 PROCEDURE — 12000035 ZZH R&B POSTPARTUM

## 2020-09-17 PROCEDURE — 85018 HEMOGLOBIN: CPT | Performed by: OBSTETRICS & GYNECOLOGY

## 2020-09-17 PROCEDURE — 36415 COLL VENOUS BLD VENIPUNCTURE: CPT | Performed by: OBSTETRICS & GYNECOLOGY

## 2020-09-17 RX ORDER — ACETAMINOPHEN 325 MG/1
650 TABLET ORAL EVERY 4 HOURS PRN
Qty: 100 TABLET | Refills: 1 | Status: SHIPPED | OUTPATIENT
Start: 2020-09-19 | End: 2021-12-14

## 2020-09-17 RX ORDER — OXYCODONE HYDROCHLORIDE 5 MG/1
5-10 TABLET ORAL EVERY 4 HOURS PRN
Qty: 20 TABLET | Refills: 0 | Status: SHIPPED | OUTPATIENT
Start: 2020-09-17 | End: 2021-12-14

## 2020-09-17 RX ORDER — IBUPROFEN 800 MG/1
800 TABLET, FILM COATED ORAL EVERY 8 HOURS PRN
Qty: 100 TABLET | Refills: 1 | Status: SHIPPED | OUTPATIENT
Start: 2020-09-17 | End: 2021-12-14

## 2020-09-17 RX ORDER — AMOXICILLIN 250 MG
1 CAPSULE ORAL 2 TIMES DAILY PRN
Qty: 10 TABLET | Refills: 0 | Status: SHIPPED | OUTPATIENT
Start: 2020-09-17 | End: 2021-12-14

## 2020-09-17 RX ORDER — OXYCODONE HYDROCHLORIDE 5 MG/1
5-10 TABLET ORAL EVERY 4 HOURS PRN
Status: DISCONTINUED | OUTPATIENT
Start: 2020-09-17 | End: 2020-09-20 | Stop reason: HOSPADM

## 2020-09-17 RX ADMIN — IBUPROFEN 800 MG: 400 TABLET ORAL at 12:48

## 2020-09-17 RX ADMIN — KETOROLAC TROMETHAMINE 30 MG: 30 INJECTION, SOLUTION INTRAMUSCULAR at 00:59

## 2020-09-17 RX ADMIN — IBUPROFEN 800 MG: 400 TABLET ORAL at 18:51

## 2020-09-17 RX ADMIN — DOCUSATE SODIUM 50 MG AND SENNOSIDES 8.6 MG 1 TABLET: 8.6; 5 TABLET, FILM COATED ORAL at 20:50

## 2020-09-17 RX ADMIN — ACETAMINOPHEN 975 MG: 325 TABLET, FILM COATED ORAL at 18:51

## 2020-09-17 RX ADMIN — ACETAMINOPHEN 975 MG: 325 TABLET, FILM COATED ORAL at 07:04

## 2020-09-17 RX ADMIN — OXYCODONE HYDROCHLORIDE 5 MG: 5 TABLET ORAL at 11:22

## 2020-09-17 RX ADMIN — SIMETHICONE 80 MG: 80 TABLET, CHEWABLE ORAL at 11:22

## 2020-09-17 RX ADMIN — ACETAMINOPHEN 975 MG: 325 TABLET, FILM COATED ORAL at 12:49

## 2020-09-17 RX ADMIN — DOCUSATE SODIUM 50 MG AND SENNOSIDES 8.6 MG 1 TABLET: 8.6; 5 TABLET, FILM COATED ORAL at 07:05

## 2020-09-17 RX ADMIN — IBUPROFEN 800 MG: 400 TABLET ORAL at 07:05

## 2020-09-17 RX ADMIN — ACETAMINOPHEN 975 MG: 325 TABLET, FILM COATED ORAL at 00:58

## 2020-09-17 RX ADMIN — OXYCODONE HYDROCHLORIDE 5 MG: 5 TABLET ORAL at 07:05

## 2020-09-17 RX ADMIN — SERTRALINE HYDROCHLORIDE 50 MG: 50 TABLET ORAL at 20:50

## 2020-09-17 RX ADMIN — OXYCODONE HYDROCHLORIDE 5 MG: 5 TABLET ORAL at 16:47

## 2020-09-17 RX ADMIN — OXYCODONE HYDROCHLORIDE 10 MG: 5 TABLET ORAL at 20:49

## 2020-09-17 NOTE — PLAN OF CARE
Independent with cares. Ambulated with wheelchair back from NICU this edgar. Called per pt request to get optional 5-10 mg of oxycodone for times of increased pain.

## 2020-09-17 NOTE — PLAN OF CARE
VSS.  Incision C/D/I.  Up to bathroom with assist, tolerated well.  Pain well controlled with scheduled Tylenol and Toradol/Ibuprofen. Pt up to the bathroom this morning at 0620 and catheter removed. Pt showered. Incision dressing removed. Pt experiencing more pain this morning after being up and showering, so 5 mg of Oxycodone given. Pt pumping every 3 hours. Pt wheeled down to NICU at 0700 to visit babies and pump. Report given to Chinyere Krueger RN who will assume cares.

## 2020-09-17 NOTE — LACTATION NOTE
Routine visit.  Twin babies in NICU at 33+ weeks of gestation.  Mother is visiting babies frequently and is pumping every 3 hours.  She initially got 3 mls and is now getting drops.  Reassured Mother that this is very common and that if she sticks with a strict every 3 hour pumping schedule the drops will continue to increase again.  Mother is logging pumping sessions on pumping log.  Using lanolin during pumping session.    Reassurance and encouragement provided to patient.    No further questions at this time.  Will follow as needed.  Pamela Sanchez RNC-IBCLC

## 2020-09-17 NOTE — PROGRESS NOTES
S: Pt doing well. Infants are in nicu.  Pain is controlled with current analgesics.      O: /73   Pulse 74   Temp 98.5  F (36.9  C) (Oral)   Resp 16   LMP 01/26/2020 (Exact Date)   SpO2 96%         ABD:  Uterine fundus is firm, non-tender and at the level of the umbilicus       INC: clean/dry/intact                Hemoglobin   Date Value Ref Range Status   09/16/2020 11.6 (L) 11.7 - 15.7 g/dL Final            Lab Results   Component Value Date    RH Pos 09/16/2020       A: Post-op Day #1 s/p C/Section after delivery of twins at 33w 4d with SROM    Babies in NICU    P: Continue Post Op Cares

## 2020-09-17 NOTE — PLAN OF CARE
Vital signs stable. Postpartum assessment WDL. Voiding without difficulty. Incision intact with steri strips. Pain controlled with Tylenol, Ibuprofen and Oxycodone. C/o intense burning in incision and stated she was unable to amb. to NICU this am. Went to see babies in  and was able to amb. back to floor after pain meds. Wearing abd. binder. Simethicone given for gas pains.  Patient is passing gas. Hgb. 11.1, PIV removed. Pumping every 3 hrs. Able to hold babies today. Is grateful they are doing well. Will continue with current plan of care.

## 2020-09-18 PROCEDURE — 12000035 ZZH R&B POSTPARTUM

## 2020-09-18 PROCEDURE — 25000128 H RX IP 250 OP 636: Performed by: OBSTETRICS & GYNECOLOGY

## 2020-09-18 PROCEDURE — 25000132 ZZH RX MED GY IP 250 OP 250 PS 637: Performed by: OBSTETRICS & GYNECOLOGY

## 2020-09-18 PROCEDURE — 90686 IIV4 VACC NO PRSV 0.5 ML IM: CPT | Performed by: OBSTETRICS & GYNECOLOGY

## 2020-09-18 RX ADMIN — INFLUENZA A VIRUS A/GUANGDONG-MAONAN/SWL1536/2019 CNIC-1909 (H1N1) ANTIGEN (FORMALDEHYDE INACTIVATED), INFLUENZA A VIRUS A/HONG KONG/2671/2019 (H3N2) ANTIGEN (FORMALDEHYDE INACTIVATED), INFLUENZA B VIRUS B/PHUKET/3073/2013 ANTIGEN (FORMALDEHYDE INACTIVATED), AND INFLUENZA B VIRUS B/WASHINGTON/02/2019 ANTIGEN (FORMALDEHYDE INACTIVATED) 0.5 ML: 15; 15; 15; 15 INJECTION, SUSPENSION INTRAMUSCULAR at 14:39

## 2020-09-18 RX ADMIN — OXYCODONE HYDROCHLORIDE 10 MG: 5 TABLET ORAL at 14:32

## 2020-09-18 RX ADMIN — OXYCODONE HYDROCHLORIDE 10 MG: 5 TABLET ORAL at 02:00

## 2020-09-18 RX ADMIN — IBUPROFEN 800 MG: 400 TABLET ORAL at 07:50

## 2020-09-18 RX ADMIN — ACETAMINOPHEN 975 MG: 325 TABLET, FILM COATED ORAL at 02:00

## 2020-09-18 RX ADMIN — ACETAMINOPHEN 975 MG: 325 TABLET, FILM COATED ORAL at 20:07

## 2020-09-18 RX ADMIN — IBUPROFEN 800 MG: 400 TABLET ORAL at 14:33

## 2020-09-18 RX ADMIN — OXYCODONE HYDROCHLORIDE 10 MG: 5 TABLET ORAL at 10:07

## 2020-09-18 RX ADMIN — IBUPROFEN 800 MG: 400 TABLET ORAL at 02:01

## 2020-09-18 RX ADMIN — OXYCODONE HYDROCHLORIDE 5 MG: 5 TABLET ORAL at 18:18

## 2020-09-18 RX ADMIN — ACETAMINOPHEN 975 MG: 325 TABLET, FILM COATED ORAL at 07:50

## 2020-09-18 RX ADMIN — OXYCODONE HYDROCHLORIDE 10 MG: 5 TABLET ORAL at 06:02

## 2020-09-18 RX ADMIN — SERTRALINE HYDROCHLORIDE 50 MG: 50 TABLET ORAL at 20:07

## 2020-09-18 RX ADMIN — IBUPROFEN 800 MG: 400 TABLET ORAL at 20:33

## 2020-09-18 RX ADMIN — DOCUSATE SODIUM 50 MG AND SENNOSIDES 8.6 MG 2 TABLET: 8.6; 5 TABLET, FILM COATED ORAL at 20:08

## 2020-09-18 RX ADMIN — DOCUSATE SODIUM 50 MG AND SENNOSIDES 8.6 MG 1 TABLET: 8.6; 5 TABLET, FILM COATED ORAL at 07:50

## 2020-09-18 RX ADMIN — OXYCODONE HYDROCHLORIDE 5 MG: 5 TABLET ORAL at 22:16

## 2020-09-18 RX ADMIN — ACETAMINOPHEN 975 MG: 325 TABLET, FILM COATED ORAL at 14:33

## 2020-09-18 NOTE — PROGRESS NOTES
Obstetrics Postpartum Rounding Note, POD#2    S: Doing well. Pain controlled, had more side pain this am, better now with increased meds. Babies in NICU. Minimal lochia. Swelling of legs      O:   Vitals:    09/18/20 0201 09/18/20 0245 09/18/20 0602 09/18/20 1007   BP:    137/74   BP Location:       Pulse:    72   Resp: 16 16 16 16   Temp:    98.4  F (36.9  C)   TempSrc:    Oral   SpO2:           Gen: AOx3, NAD  Abd: soft, ND, mild approp ttp, FF@ U-1. Incision with steri strips, no erythema/discharge  Ext: no calf ttp, 2+ b/l LE edema    Labs:         Hemoglobin   Date Value Ref Range Status   09/17/2020 11.1 (L) 11.7 - 15.7 g/dL Final   09/16/2020 11.6 (L) 11.7 - 15.7 g/dL Final            Lab Results   Component Value Date    RH Pos 09/16/2020       Meds:  Current Facility-Administered Medications   Medication     [START ON 9/19/2020] acetaminophen (TYLENOL) tablet 650 mg     acetaminophen (TYLENOL) tablet 975 mg     bisacodyl (DULCOLAX) Suppository 10 mg     dextrose 5% in lactated ringers infusion     hydrocortisone 2.5 % cream     HYDROmorphone (PF) (DILAUDID) injection 0.3-0.5 mg     ibuprofen (ADVIL/MOTRIN) tablet 800 mg     lactated ringers BOLUS 1,000 mL     lanolin cream     lidocaine (LMX4) cream     lidocaine 1 % 0.1-1 mL     naloxone (NARCAN) injection 0.1-0.4 mg     No MMR Needed -  Assessment: Patient does not need MMR vaccine     NO Rho (D) immune globulin (RhoGam) needed - mother Rh POSITIVE     No Tdap Needed - Assessment: Patient does not need Tdap vaccine     ondansetron (ZOFRAN) injection 4 mg     oxyCODONE (ROXICODONE) tablet 5-10 mg     oxytocin (PITOCIN) 30 units in 500 mL 0.9% NaCl infusion     oxytocin (PITOCIN) 30 units in 500 mL 0.9% NaCl infusion     oxytocin (PITOCIN) injection 10 Units     senna-docusate (SENOKOT-S/PERICOLACE) 8.6-50 MG per tablet 1 tablet    Or     senna-docusate (SENOKOT-S/PERICOLACE) 8.6-50 MG per tablet 2 tablet     sertraline (ZOLOFT) tablet 50 mg     simethicone  (MYLICON) chewable tablet 80 mg     sodium chloride (PF) 0.9% PF flush 3 mL     sodium chloride (PF) 0.9% PF flush 3 mL     sodium phosphate (FLEET ENEMA) 1 enema     tranexamic acid 1 g in 100 mL 0.7% NaCl IV bag (premix)       A/P: POD#2 s/p LTCS for twins at 33wk doing well.  - LE swelling B/L. Will get ESTELITA hose  -Continue routine care.  -Anticipate discharge day 3-4    Marissa Chávez Masters, DO  September 18, 2020

## 2020-09-18 NOTE — PLAN OF CARE
Vital signs stable. Postpartum assessment WDL. Incision steri strips on .Pain controlled with tylenol,motrin and oxycodone Pt waited to take pain med's once increased pain level 8-9/10 encouraged to take pain med's on schedule  . Patient  up ambulating and voiding without difficulty . Patient passing gas. Pumping every 3 hrs  Patient and infant bonding well. Will continue with current plan of care.

## 2020-09-18 NOTE — LACTATION NOTE
Routine visit with Yulisa and BARBI.  Twins in NICU.    Breastfeeding general information reviewed.   Advised to pump 8-12x/day.  Reviewed benefits of holding and skin to skin.  Encouraged lots of skin to skin.   Continues to nurse well per mom. Has a breast pump and outpatient resources reviewed.  No further questions at this time.   Will follow as needed.   Felicitas VALENTEN, RN, PHN, RNC-MNN, IBCLC

## 2020-09-18 NOTE — PLAN OF CARE
Vital signs stable. Voiding without difficulty. Lung sounds clear and equal. Using oxycodone/ibuprofen/tylenol for pain management. Her pain has gotten better throughout the day. Up ambulating free of dizziness. She has 3+ edema in her lower extremities, RN put on compression socks to help. Going to NICU to visit babies. Encouraged to call with questions/concerns. Will continue to monitor.

## 2020-09-19 PROCEDURE — 25000132 ZZH RX MED GY IP 250 OP 250 PS 637: Performed by: OBSTETRICS & GYNECOLOGY

## 2020-09-19 PROCEDURE — 12000035 ZZH R&B POSTPARTUM

## 2020-09-19 RX ADMIN — IBUPROFEN 800 MG: 400 TABLET ORAL at 15:14

## 2020-09-19 RX ADMIN — DOCUSATE SODIUM 50 MG AND SENNOSIDES 8.6 MG 1 TABLET: 8.6; 5 TABLET, FILM COATED ORAL at 08:32

## 2020-09-19 RX ADMIN — ACETAMINOPHEN 975 MG: 325 TABLET, FILM COATED ORAL at 02:42

## 2020-09-19 RX ADMIN — IBUPROFEN 800 MG: 400 TABLET ORAL at 08:32

## 2020-09-19 RX ADMIN — SERTRALINE HYDROCHLORIDE 50 MG: 50 TABLET ORAL at 20:16

## 2020-09-19 RX ADMIN — IBUPROFEN 800 MG: 400 TABLET ORAL at 02:42

## 2020-09-19 RX ADMIN — OXYCODONE HYDROCHLORIDE 5 MG: 5 TABLET ORAL at 10:41

## 2020-09-19 RX ADMIN — ACETAMINOPHEN 650 MG: 325 TABLET, FILM COATED ORAL at 15:14

## 2020-09-19 RX ADMIN — DOCUSATE SODIUM 50 MG AND SENNOSIDES 8.6 MG 2 TABLET: 8.6; 5 TABLET, FILM COATED ORAL at 20:17

## 2020-09-19 RX ADMIN — OXYCODONE HYDROCHLORIDE 5 MG: 5 TABLET ORAL at 06:37

## 2020-09-19 RX ADMIN — ACETAMINOPHEN 650 MG: 325 TABLET, FILM COATED ORAL at 08:32

## 2020-09-19 RX ADMIN — OXYCODONE HYDROCHLORIDE 5 MG: 5 TABLET ORAL at 02:42

## 2020-09-19 RX ADMIN — ACETAMINOPHEN 650 MG: 325 TABLET, FILM COATED ORAL at 20:16

## 2020-09-19 RX ADMIN — OXYCODONE HYDROCHLORIDE 5 MG: 5 TABLET ORAL at 15:14

## 2020-09-19 RX ADMIN — OXYCODONE HYDROCHLORIDE 5 MG: 5 TABLET ORAL at 20:16

## 2020-09-19 NOTE — PROGRESS NOTES
S: Pt doing well. Infants in nicu. Pain is well controlled.  Twins were 33 weeks     O: BP (!) 148/91   Pulse 86   Temp 98.2  F (36.8  C) (Oral)   Resp 16   LMP 01/26/2020 (Exact Date)   SpO2 96%    Patient in nicu when I arrived so our visit was by phone                Hemoglobin   Date Value Ref Range Status   09/17/2020 11.1 (L) 11.7 - 15.7 g/dL Final            Lab Results   Component Value Date    RH Pos 09/16/2020       A: Post-op Day # 3 s/p C/Section for twins at 33wks    P: Continue Post Op Care       Patient wants to discharge tomorrow since babies still both in nicu.

## 2020-09-19 NOTE — PLAN OF CARE
Vital signs stable. Postpartum assessment WDL. Incision steri-strips CDI, no drainage noted. Pain controlled with Tylenol/Ibuprofen/Oxycodone. Patient ambulating independent. Patient passing gas. Pumping, newborns in NICU. Will continue with current plan of care.

## 2020-09-19 NOTE — PLAN OF CARE
Yulisa doing well today; pain is better controlled including the oxycodone 5mg q4h. Using an abdominal binder while up; feeling most of the discomfort at the incision site. Steri strips inact and incision well approximated. Showered today and tolerating regular diet. 3+ pitting edema bilateral lower extremities. Wearing compression stockings bilaterally. BPs 140s/80s-90s today. Denies headache, vision disurbances, epigastric discomfort. Fundus firm and scant rubra lochia.  Ambulating indpendently to NICU and spending quite a bit of time down there; twins have been transferred to twin room, so she can now spend time with them together. Pumping 8-10x/day; milk is coming in and she's so excited to be getting increased volumes!  Birth certificates completed.

## 2020-09-20 VITALS
HEART RATE: 87 BPM | OXYGEN SATURATION: 96 % | DIASTOLIC BLOOD PRESSURE: 80 MMHG | TEMPERATURE: 98.3 F | RESPIRATION RATE: 16 BRPM | SYSTOLIC BLOOD PRESSURE: 144 MMHG

## 2020-09-20 PROCEDURE — 25000132 ZZH RX MED GY IP 250 OP 250 PS 637: Performed by: OBSTETRICS & GYNECOLOGY

## 2020-09-20 RX ADMIN — OXYCODONE HYDROCHLORIDE 5 MG: 5 TABLET ORAL at 00:04

## 2020-09-20 RX ADMIN — DOCUSATE SODIUM 50 MG AND SENNOSIDES 8.6 MG 1 TABLET: 8.6; 5 TABLET, FILM COATED ORAL at 08:14

## 2020-09-20 RX ADMIN — OXYCODONE HYDROCHLORIDE 5 MG: 5 TABLET ORAL at 08:14

## 2020-09-20 RX ADMIN — IBUPROFEN 800 MG: 400 TABLET ORAL at 04:13

## 2020-09-20 RX ADMIN — ACETAMINOPHEN 650 MG: 325 TABLET, FILM COATED ORAL at 00:04

## 2020-09-20 RX ADMIN — OXYCODONE HYDROCHLORIDE 5 MG: 5 TABLET ORAL at 04:13

## 2020-09-20 RX ADMIN — ACETAMINOPHEN 650 MG: 325 TABLET, FILM COATED ORAL at 04:13

## 2020-09-20 RX ADMIN — ACETAMINOPHEN 650 MG: 325 TABLET, FILM COATED ORAL at 08:14

## 2020-09-20 NOTE — PLAN OF CARE
Vital signs stable. Postpartum assessment WDL. Incision open to air, steri-strips in place. Pain controlled with Tylenol/Oxycodone. Patient ambulating independently. Patient passing gas. Newborns in NICU, mother pumping every three hours. Patient and infant bonding well. Will continue with current plan of care.

## 2020-09-20 NOTE — PLAN OF CARE
Yulisa doing well today; despite waking to pump at night, feels as though she's getting better sleep than she has in weeks. Increased pain during the night; discuss taking the increased oxycodone dose at bedtime if needed for discomfort. BPs 140s/80s-90s - Dr. Starr in department and made aware. Yulisa states that she has a BP cuff at home she could monitor with if needed. Continues to have 3+ pitting edema bilateral lower extremities - wearing compression stockings.  To discharge this morning.

## 2020-09-20 NOTE — LACTATION NOTE
"Routine Visit with Yulisa. Pt pumping exclusively for 33 week twin infants in NICU. Pt pumped and bottled with first babe. Has hopes of breastfeeding the twins when they are closer to term. Encouraged to continue removing milk, feeding infants and keeping the time at breast positive. Using double electric hospital grade pump. Pt has hands free pump. Encouraged to watch \"Maximizing Milk Supply\" video on [employer]. Encouraged to pump every 2-3 hours with taking a 4 hour break once at night to sleep. Encouraged to power pump 1-2 times a day. Instructed on how to clean pump parts. Has a pump for home use. No further questions at this time. Will follow up as needed.  KELY Francisco RN, BSN, PHN, IBCLC    "

## 2020-09-20 NOTE — PROGRESS NOTES
POD#4  Babies doing well in NICU  Pts pain manageable. Taking all 3 meds.   Plan home today  RTC 6 weeks.

## 2020-09-23 NOTE — DISCHARGE SUMMARY
Admit Date:     2020   Discharge Date:     2020      The patient is a 40-year-old with known twin gestation.  She called with complaints of spontaneous rupture of membranes at 33 + 3 weeks' gestation.  The patient was also fer on admission.  She was counseled as to risks and benefits of  delivery versus vaginal delivery.  She and her  wished to proceed with  section.      She went on to deliver twin A, male in vertex presentation with Apgars of 7 and 7, weighing 5 pounds 5 ounces and twin B in vertex presentation, female with Apgars of 5 and 7, weighing 4 pounds 11 ounces.  Her postoperative course was uneventful.  She was discharged on the fourth postoperative day with a return appointment in 6 weeks for postpartum check.  Babies were still in the NICU due to their prematurity.         SANTIAGO JUAN MD             D: 2020   T: 2020   MT: LANCE      Name:     SALLIE GARCÍA   MRN:      [id number]        Account:        [id number]   :      1980           Admit Date:     2020                                  Discharge Date: 2020      Document: [id number]       cc: Lorraine Patino MD

## 2020-09-25 ENCOUNTER — TELEPHONE (OUTPATIENT)
Dept: OBGYN | Facility: CLINIC | Age: 40
End: 2020-09-25

## 2020-09-25 NOTE — TELEPHONE ENCOUNTER
Type of Paperwork received:  fmla     Date Rcvd:  9/24/2020    Rcvd From (Company name): [employer]    Provider:  quinton Scales on Provider Cart Date:  9/25/2020

## 2020-09-29 NOTE — TELEPHONE ENCOUNTER
Faxed to [facility]  Faxed to NEHEMIAS Polo specialist

## 2020-11-03 NOTE — TELEPHONE ENCOUNTER
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    Testing done:  Innatal Prenatal Screen    Action:  MD routed to triage to call via result note    Gender:  per pt request gender reveiled over the phone, informed at least one boy confirmed     Pt verbalized understanding, in agreement with plan, and voiced no further questions.    Mindy Dolan RN      
,

## 2020-11-11 ENCOUNTER — PRENATAL OFFICE VISIT (OUTPATIENT)
Dept: OBGYN | Facility: CLINIC | Age: 40
End: 2020-11-11
Payer: COMMERCIAL

## 2020-11-11 VITALS
DIASTOLIC BLOOD PRESSURE: 66 MMHG | BODY MASS INDEX: 34.84 KG/M2 | SYSTOLIC BLOOD PRESSURE: 110 MMHG | WEIGHT: 222 LBS | HEART RATE: 62 BPM | HEIGHT: 67 IN

## 2020-11-11 PROCEDURE — 99207 PR POST PARTUM EXAM: CPT | Performed by: OBSTETRICS & GYNECOLOGY

## 2020-11-11 RX ORDER — ACETAMINOPHEN AND CODEINE PHOSPHATE 120; 12 MG/5ML; MG/5ML
0.35 SOLUTION ORAL DAILY
Qty: 90 TABLET | Refills: 3 | Status: SHIPPED | OUTPATIENT
Start: 2020-11-11 | End: 2021-10-05

## 2020-11-11 SDOH — HEALTH STABILITY: MENTAL HEALTH: HOW OFTEN DO YOU HAVE 6 OR MORE DRINKS ON ONE OCCASION?: NEVER

## 2020-11-11 SDOH — HEALTH STABILITY: MENTAL HEALTH: HOW MANY STANDARD DRINKS CONTAINING ALCOHOL DO YOU HAVE ON A TYPICAL DAY?: 1 OR 2

## 2020-11-11 SDOH — HEALTH STABILITY: MENTAL HEALTH: HOW OFTEN DO YOU HAVE A DRINK CONTAINING ALCOHOL?: MONTHLY OR LESS

## 2020-11-11 ASSESSMENT — ANXIETY QUESTIONNAIRES
5. BEING SO RESTLESS THAT IT IS HARD TO SIT STILL: SEVERAL DAYS
7. FEELING AFRAID AS IF SOMETHING AWFUL MIGHT HAPPEN: NOT AT ALL
IF YOU CHECKED OFF ANY PROBLEMS ON THIS QUESTIONNAIRE, HOW DIFFICULT HAVE THESE PROBLEMS MADE IT FOR YOU TO DO YOUR WORK, TAKE CARE OF THINGS AT HOME, OR GET ALONG WITH OTHER PEOPLE: NOT DIFFICULT AT ALL
GAD7 TOTAL SCORE: 2
3. WORRYING TOO MUCH ABOUT DIFFERENT THINGS: NOT AT ALL
6. BECOMING EASILY ANNOYED OR IRRITABLE: SEVERAL DAYS
1. FEELING NERVOUS, ANXIOUS, OR ON EDGE: NOT AT ALL
2. NOT BEING ABLE TO STOP OR CONTROL WORRYING: NOT AT ALL

## 2020-11-11 ASSESSMENT — PATIENT HEALTH QUESTIONNAIRE - PHQ9
5. POOR APPETITE OR OVEREATING: NOT AT ALL
SUM OF ALL RESPONSES TO PHQ QUESTIONS 1-9: 1

## 2020-11-11 ASSESSMENT — MIFFLIN-ST. JEOR: SCORE: 1719.62

## 2020-11-11 NOTE — PROGRESS NOTES
"  SUBJECTIVE:  Yulisa Rai,  is here for a postpartum visit.  She had a  Section  on 2020 delivering a healthy baby twins, one boy and one girl, named Miguel Angel Horne weighing 4 lbs 2 oz at term.  weighing 4 lbs 10 oz at term.    HPI:  Patient is doing well at home with her twins.   She is having her  help by doing shifts to feed them so they can alternate sleep.    Feeling good.  Is doing a great job with social distancing and covid precautions  Says her moods are pretty good this time, helps being a second pregnancy.   Last PHQ-9 score on record=   PHQ-9 SCORE 2020   PHQ-9 Total Score -   PHQ-9 Total Score 1     RBIJESH-7 SCORE 10/15/2018 3/18/2020 2020   Total Score 3 0 2       Pap: (  Lab Results   Component Value Date    PAP NIL hpv-  2018    PAP NIL 2011    PAP NIL 2010       Delivery complications:  No  Breast feeding:  Yes,   Bladder problems:  No  Bowel problems/hemorrhoids:  No  Episiotomy/laceration/incision healed? No  Vaginal flow:  None  Cowlic:  No  Contraception: none  Emotional adjustment:  doing well and happy  Back to work: 2021    12 point review of systems negative other than symptoms noted below or in the HPI.    OBJECTIVE:  Vitals: /66   Pulse 62   Ht 1.702 m (5' 7\")   Wt 100.7 kg (222 lb)   LMP 2020 (Exact Date)   BMI 34.77 kg/m    BMI= Body mass index is 34.77 kg/m .  General - pleasant female in no acute distress.  Breast -  deferred  es or tenderness.      ASSESSMENT:    ICD-10-CM    1. Routine postpartum follow-up  Z39.2 norethindrone (MICRONOR) 0.35 MG tablet       PLAN:  May resume normal activities without restrictions.  Pap smear was not done today.    Full counseling was provided, and all questions were answered.   Return to clinic in one year for an annual visit.   Wishes to start ocp micronor for now for contraception    I was running quite late in clinic for her visit this morning due to 2 " patients with urgent complicated problems just ahead of her in clinic today.  This was explained to patient during her visit today.  I was unaware there would be a long delay today in advance.      Patient seems to be handling the stress of twins and the world stresses very well.         Lorraine Patino MD

## 2020-11-12 ASSESSMENT — ANXIETY QUESTIONNAIRES: GAD7 TOTAL SCORE: 2

## 2021-08-25 ENCOUNTER — APPOINTMENT (OUTPATIENT)
Dept: URBAN - METROPOLITAN AREA CLINIC 257 | Age: 41
Setting detail: DERMATOLOGY
End: 2021-08-29

## 2021-08-25 DIAGNOSIS — D485 NEOPLASM OF UNCERTAIN BEHAVIOR OF SKIN: ICD-10-CM

## 2021-08-25 DIAGNOSIS — L57.8 OTHER SKIN CHANGES DUE TO CHRONIC EXPOSURE TO NONIONIZING RADIATION: ICD-10-CM

## 2021-08-25 DIAGNOSIS — L72.8 OTHER FOLLICULAR CYSTS OF THE SKIN AND SUBCUTANEOUS TISSUE: ICD-10-CM

## 2021-08-25 PROBLEM — D48.5 NEOPLASM OF UNCERTAIN BEHAVIOR OF SKIN: Status: ACTIVE | Noted: 2021-08-25

## 2021-08-25 PROCEDURE — OTHER SNIP BIOPSY: OTHER

## 2021-08-25 PROCEDURE — OTHER COUNSELING: OTHER

## 2021-08-25 PROCEDURE — 99202 OFFICE O/P NEW SF 15 MIN: CPT | Mod: 25

## 2021-08-25 PROCEDURE — 11900 INJECT SKIN LESIONS </W 7: CPT

## 2021-08-25 PROCEDURE — 88305 TISSUE EXAM BY PATHOLOGIST: CPT

## 2021-08-25 PROCEDURE — OTHER PATHOLOGY BILLING: OTHER

## 2021-08-25 PROCEDURE — OTHER MIPS QUALITY: OTHER

## 2021-08-25 PROCEDURE — 11102 TANGNTL BX SKIN SINGLE LES: CPT

## 2021-08-25 PROCEDURE — OTHER INTRALESIONAL KENALOG: OTHER

## 2021-08-25 ASSESSMENT — LOCATION ZONE DERM
LOCATION ZONE: FACE
LOCATION ZONE: NECK

## 2021-08-25 ASSESSMENT — LOCATION DETAILED DESCRIPTION DERM
LOCATION DETAILED: LEFT POSTERIOR NECK
LOCATION DETAILED: LEFT CENTRAL LATERAL NECK
LOCATION DETAILED: INFERIOR MID FOREHEAD

## 2021-08-25 ASSESSMENT — LOCATION SIMPLE DESCRIPTION DERM
LOCATION SIMPLE: POSTERIOR NECK
LOCATION SIMPLE: NECK
LOCATION SIMPLE: INFERIOR FOREHEAD

## 2021-08-25 NOTE — PROCEDURE: COUNSELING
Patient Specific Counseling (Will Not Stick From Patient To Patient): Discussed Botox for glabella
Detail Level: Detailed
Patient Specific Counseling (Will Not Stick From Patient To Patient): Scar tissue vs cyst\\nDiscussed 30’ surgery for definitive removal if ILK is ineffective
Detail Level: Zone

## 2021-08-25 NOTE — PROCEDURE: SNIP BIOPSY
Anesthesia Volume In Cc (Will Not Render If 0): 0.5
Dressing: bandage
Billing Type: Third-Party Bill
Curettage Text: The wound bed was treated with curettage after the biopsy was performed.
Information: Selecting Yes will display possible errors in your note based on the variables you have selected. This validation is only offered as a suggestion for you. PLEASE NOTE THAT THE VALIDATION TEXT WILL BE REMOVED WHEN YOU FINALIZE YOUR NOTE. IF YOU WANT TO FAX A PRELIMINARY NOTE YOU WILL NEED TO TOGGLE THIS TO 'NO' IF YOU DO NOT WANT IT IN YOUR FAXED NOTE.
Notification Instructions: Patient will be notified of biopsy results. However, patient instructed to call the office if not contacted within 2 weeks.
Validate Anticipated Plan: No
Biopsy Type: H and E
Electrodesiccation And Curettage Text: The wound bed was treated with electrodesiccation and curettage after the biopsy was performed.
Type Of Destruction Used: Curettage
Additional Anesthesia Volume In Cc (Will Not Render If 0): 0
Wound Care: Petrolatum
Electrodesiccation Text: The wound bed was treated with electrodesiccation after the biopsy was performed.
Detail Level: Detailed
Post-Care Instructions: I reviewed with the patient in detail post-care instructions. Patient is to keep the biopsy site dry overnight, and then apply bacitracin twice daily until healed. Patient may apply hydrogen peroxide soaks to remove any crusting.
Body Location Override (Optional - Billing Will Still Be Based On Selected Body Map Location If Applicable): left posterior neck
Cryotherapy Text: The wound bed was treated with cryotherapy after the biopsy was performed.
Consent: Written consent was obtained and risks were reviewed including but not limited to scarring, infection, bleeding, scabbing, incomplete removal, nerve damage and allergy to anesthesia.
Anesthesia Type: 1% lidocaine with epinephrine
Silver Nitrate Text: The wound bed was treated with silver nitrate after the biopsy was performed.
Hemostasis: Drysol
Depth Of Biopsy: dermis
Biopsy Method: scissors

## 2021-08-25 NOTE — PROCEDURE: PATHOLOGY BILLING
Immunohistochemistry ([id number] and [id number]) billing is not performed here. Please use the Immunohistochemistry Stain Billing plan to accomplish this. Immunohistochemistry ([id number] and [id number]) billing is not performed here. Please use the Immunohistochemistry Stain Billing plan to accomplish this.

## 2021-08-25 NOTE — PROCEDURE: INTRALESIONAL KENALOG
Consent: The risks of atrophy were reviewed with the patient.
Include Z78.9 (Other Specified Conditions Influencing Health Status) As An Associated Diagnosis?: No
Validate Note Data When Using Inventory: Yes
Detail Level: Detailed
Kenalog Preparation: Kenalog
Medical Necessity Clause: This procedure was medically necessary because the lesions that were treated were:
Total Volume Injected (Ccs- Only Use Numbers And Decimals): 0.1
X Size Of Lesion In Cm (Optional): 0
Concentration Of Solution Injected (Mg/Ml): 5.0

## 2021-10-05 RX ORDER — ACETAMINOPHEN AND CODEINE PHOSPHATE 120; 12 MG/5ML; MG/5ML
0.35 SOLUTION ORAL DAILY
Qty: 84 TABLET | Refills: 0 | Status: SHIPPED | OUTPATIENT
Start: 2021-10-05 | End: 2021-12-14

## 2021-10-05 NOTE — TELEPHONE ENCOUNTER
"Requested Prescriptions   Pending Prescriptions Disp Refills     NORLYDA 0.35 MG tablet [Pharmacy Med Name: NORLYDA 0.35MG TABLETS 28S] 84 tablet      Sig: TAKE 1 TABLET(0.35 MG) BY MOUTH DAILY       Contraceptives Protocol Passed - 10/5/2021  3:08 AM        Passed - Patient is not a current smoker if age is 35 or older        Passed - Recent (12 mo) or future (30 days) visit within the authorizing provider's specialty     Patient has had an office visit with the authorizing provider or a provider within the authorizing providers department within the previous 12 mos or has a future within next 30 days. See \"Patient Info\" tab in inbasket, or \"Choose Columns\" in Meds & Orders section of the refill encounter.              Passed - Medication is active on med list        Passed - No active pregnancy on record        Passed - No positive pregnancy test in past 12 months           Last Written Prescription Date:  11/11/20  Last Fill Quantity: 90,  # refills: 3   Last office visit: 11/11/20 with prescribing provider:  Sukumar   Future Office Visit:  NONE    Medication is being filled for 1 time refill only due to:  Patient needs to be seen because due for annual in November.  Arina Kim RN on 10/5/2021 at 6:09 AM        "

## 2021-10-06 ENCOUNTER — APPOINTMENT (OUTPATIENT)
Dept: URBAN - METROPOLITAN AREA CLINIC 257 | Age: 41
Setting detail: DERMATOLOGY
End: 2021-10-07

## 2021-10-06 DIAGNOSIS — Z41.9 ENCOUNTER FOR PROCEDURE FOR PURPOSES OTHER THAN REMEDYING HEALTH STATE, UNSPECIFIED: ICD-10-CM

## 2021-10-06 DIAGNOSIS — L72.8 OTHER FOLLICULAR CYSTS OF THE SKIN AND SUBCUTANEOUS TISSUE: ICD-10-CM

## 2021-10-06 PROCEDURE — OTHER BOTOX: OTHER

## 2021-10-06 PROCEDURE — OTHER COUNSELING: OTHER

## 2021-10-06 PROCEDURE — 99212 OFFICE O/P EST SF 10 MIN: CPT

## 2021-10-06 ASSESSMENT — LOCATION ZONE DERM: LOCATION ZONE: NECK

## 2021-10-06 ASSESSMENT — LOCATION SIMPLE DESCRIPTION DERM: LOCATION SIMPLE: NECK

## 2021-10-06 ASSESSMENT — LOCATION DETAILED DESCRIPTION DERM: LOCATION DETAILED: LEFT CENTRAL LATERAL NECK

## 2021-10-06 NOTE — PROCEDURE: BOTOX
Show Topical Anesthesia: Yes
Additional Area 5 Units: 0
Show Ucl Units: No
Glabellar Complex Units: 18
Detail Level: Detailed
Forehead Units: 10
Additional Area 1 Location: Lateral Brow
Dilution (U/0.1 Cc): 1
Additional Area 1 Units: 2
Consent: Written consent obtained. Risks include but not limited to lid/brow ptosis, bruising, swelling, diplopia, temporary effect, incomplete chemical denervation.
Post-Care Instructions: Patient instructed to not lie down for 4 hours and limit physical activity for 24 hours.

## 2021-10-06 NOTE — PROCEDURE: COUNSELING
Detail Level: Detailed
Patient Specific Counseling (Will Not Stick From Patient To Patient): Patient is going to watch the cyst. The punch removal was not done today as the previous ILK injection help to improve the cyst.

## 2021-10-20 ENCOUNTER — APPOINTMENT (OUTPATIENT)
Dept: URBAN - METROPOLITAN AREA CLINIC 257 | Age: 41
Setting detail: DERMATOLOGY
End: 2021-10-25

## 2021-10-24 ENCOUNTER — HEALTH MAINTENANCE LETTER (OUTPATIENT)
Age: 41
End: 2021-10-24

## 2021-12-13 NOTE — PROGRESS NOTES
SUBJECTIVE:                                                   Yulisa Rai is a 41 year old female who presents to clinic today for the following health issue(s):  Patient presents with:  Breast Problem: c/o left breast pain for a few days, denies any lumps      HPI:  Patient here today with complaints of acute onset left breast pain.  Her pain started on Saturday.  She denies any trauma to the area.  She stopped pumping in July.  She has 15-month-old twins as well as older child at home.    She has not had a routine screening mammogram as she has not 42 yet.    Patient's last menstrual period was 2021 (exact date)..     Patient is sexually active, .  Using vasectomy for contraception.    reports that she has never smoked. She has never used smokeless tobacco.    STD testing offered?  Declined    Health maintenance updated:  yes    Today's PHQ-2 Score:   PHQ-2 (  [employer]) 2020   Q1: Little interest or pleasure in doing things 0   Q2: Feeling down, depressed or hopeless 0   PHQ-2 Score 0   PHQ-2 Total Score (12-17 Years)- Positive if 3 or more points; Administer PHQ-A if positive 0     Today's PHQ-9 Score:   PHQ-9 SCORE 2020   PHQ-9 Total Score -   PHQ-9 Total Score 1     Today's BRIJESH-7 Score:   BRIJESH-7 SCORE 2020   Total Score 2       Problem list and histories reviewed & adjusted, as indicated.  Additional history: as documented.    Patient Active Problem List   Diagnosis     Personal history of other musculoskeletal disorders     Herpes simplex virus (HSV) infection     Cyst of Bartholin's gland     ASCUS with positive high risk HPV cervical     Bunions     CARDIOVASCULAR SCREENING; LDL GOAL LESS THAN 160     Generalized anxiety disorder     Intermittent asthma     Indication for care in labor or delivery     Pregnancy, supervision, high-risk     Dichorionic diamniotic twin pregnancy, antepartum      labor      delivery delivered     Past Surgical  History:   Procedure Laterality Date      SECTION N/A 2020    Procedure: PRIMARY  SECTION;  Surgeon: Moisés Starr MD;  Location:  [facility]  ,      GYN SURGERY  2017    ibroid removed     HEAD & NECK SURGERY      wisdom teeth     HYSTEROSCOPY, ABLATE ENDOMETRIUM VERSAPOINT , COMBINED N/A 2017    Procedure: COMBINED HYSTEROSCOPY, ABLATE ENDOMETRIUM VERSAPOINT;  HYSTEROSCOPY WITH VERSA POINT MYOMECTOMY x 1, MULTIPLE POLYPECTOMIES ; LAPAROSCOPY FOR MYOMECTOMY X2 (<5cm);  Surgeon: Josef Masters MD;  Location: [facility]     LAPAROSCOPIC MYOMECTOMY UTERUS  2017    Procedure: LAPAROSCOPIC MYOMECTOMY UTERUS;;  Surgeon: Josef Masters MD;  Location: [facility]     ZZC NONSPECIFIC PROCEDURE      wisdom teeth extraction     ZZC NONSPECIFIC PROCEDURE      rt. wrist fx     ZZC NONSPECIFIC PROCEDURE  , 3/07    ASCUS, lgsil     ZZC NONSPECIFIC PROCEDURE  3/03, 3/07    colposcopy      Social History     Tobacco Use     Smoking status: Never Smoker     Smokeless tobacco: Never Used   Substance Use Topics     Alcohol use: Yes     Comment: none since pg      Problem (# of Occurrences) Relation (Name,Age of Onset)    C.A.D. (2) Paternal Grandmother, Paternal Grandfather    Hypertension (2) Mother, Father    Lipids (2) Mother, Father    No Known Problems (5) Sister, Maternal Grandmother, Maternal Grandfather, Brother, Other            Current Outpatient Medications   Medication Sig     albuterol 90 MCG/ACT inhaler Inhale 2 puffs into the lungs every 4 hours as needed for shortness of breath / dyspnea (or 20 minutes prior to excercise).     Multiple Vitamin (MULTIVITAMIN PO)      sertraline (ZOLOFT) 50 MG tablet Take 75 mg by mouth     No current facility-administered medications for this visit.     No Known Allergies    ROS:  12 point review of systems negative other than symptoms noted below or in the HPI.  Breast: Tenderness  No urinary frequency or dysuria, bladder or kidney  "problems      OBJECTIVE:     /60   Pulse 74   Ht 1.702 m (5' 7\")   Wt 92.7 kg (204 lb 6.4 oz)   LMP 12/09/2021 (Exact Date)   Breastfeeding No   BMI 32.01 kg/m    Body mass index is 32.01 kg/m .    Exam:  Constitutional:  Appearance: Well nourished, well developed alert, in no acute distress  Breasts:  Inspection of Breasts:  Symmetric bilaterally.  No puckering.  No skin changes.  Palpation of Breasts and Axillae: Breast tenderness noted at the 1 o'clock position 4 cm from the nipple.  There is an area of thicker tissue underlying the area where she is tender.  Axillary Lymph Nodes:  No lymphadenopathy present     In-Clinic Test Results:  No results found for this or any previous visit (from the past 24 hour(s)).    ASSESSMENT/PLAN:                                                        ICD-10-CM    1. Breast pain, left  N64.4 MA Diagnostic Digital Bilateral     US Breast Left Limited 1-3 Quadrants       There are no Patient Instructions on file for this visit.    41-year-old female with acute onset of left breast pain with some fibrous tissue noted underneath.  We have encouraged her to have a diagnostic work-up with mammogram and ultrasound.  She will go to the breast center and have this scheduled.    PIETRO Vela CNP  [facility] [place]  "

## 2021-12-14 ENCOUNTER — OFFICE VISIT (OUTPATIENT)
Dept: OBGYN | Facility: CLINIC | Age: 41
End: 2021-12-14
Payer: COMMERCIAL

## 2021-12-14 VITALS
DIASTOLIC BLOOD PRESSURE: 60 MMHG | HEIGHT: 67 IN | HEART RATE: 74 BPM | BODY MASS INDEX: 32.08 KG/M2 | SYSTOLIC BLOOD PRESSURE: 116 MMHG | WEIGHT: 204.4 LBS

## 2021-12-14 DIAGNOSIS — N64.4 BREAST PAIN, LEFT: Primary | ICD-10-CM

## 2021-12-14 PROCEDURE — 99213 OFFICE O/P EST LOW 20 MIN: CPT | Performed by: NURSE PRACTITIONER

## 2021-12-14 ASSESSMENT — MIFFLIN-ST. JEOR: SCORE: 1634.78

## 2021-12-19 ENCOUNTER — HEALTH MAINTENANCE LETTER (OUTPATIENT)
Age: 41
End: 2021-12-19

## 2021-12-20 ENCOUNTER — ANCILLARY PROCEDURE (OUTPATIENT)
Dept: MAMMOGRAPHY | Facility: CLINIC | Age: 41
End: 2021-12-20
Attending: NURSE PRACTITIONER
Payer: COMMERCIAL

## 2021-12-20 DIAGNOSIS — N64.4 BREAST PAIN, LEFT: ICD-10-CM

## 2021-12-20 PROCEDURE — 76642 ULTRASOUND BREAST LIMITED: CPT | Mod: LT | Performed by: RADIOLOGY

## 2021-12-20 PROCEDURE — G0279 TOMOSYNTHESIS, MAMMO: HCPCS | Performed by: RADIOLOGY

## 2021-12-20 PROCEDURE — 77066 DX MAMMO INCL CAD BI: CPT | Performed by: RADIOLOGY

## 2022-03-16 ENCOUNTER — APPOINTMENT (OUTPATIENT)
Dept: URBAN - METROPOLITAN AREA CLINIC 257 | Age: 42
Setting detail: DERMATOLOGY
End: 2022-03-17

## 2022-03-16 DIAGNOSIS — Z41.9 ENCOUNTER FOR PROCEDURE FOR PURPOSES OTHER THAN REMEDYING HEALTH STATE, UNSPECIFIED: ICD-10-CM

## 2022-03-16 PROCEDURE — OTHER BOTOX: OTHER

## 2022-03-16 NOTE — PROCEDURE: BOTOX
Lcl Root Units: 0
Post-Care Instructions: Patient instructed to not lie down for 4 hours and limit physical activity for 24 hours.
Show Additional Area 6: Yes
Show Lcl Units: No
Dilution (U/0.1 Cc): 10
Glabellar Complex Units: 18
Additional Area 1 Units: 2
Detail Level: Detailed
Additional Area 1 Location: Lateral brow
Consent: Written consent obtained. Risks include but not limited to lid/brow ptosis, bruising, swelling, diplopia, temporary effect, incomplete chemical denervation.

## 2022-07-06 ENCOUNTER — RX ONLY (RX ONLY)
Age: 42
End: 2022-07-06

## 2022-07-06 ENCOUNTER — APPOINTMENT (OUTPATIENT)
Dept: URBAN - METROPOLITAN AREA CLINIC 257 | Age: 42
Setting detail: DERMATOLOGY
End: 2022-07-07

## 2022-07-06 DIAGNOSIS — Z41.9 ENCOUNTER FOR PROCEDURE FOR PURPOSES OTHER THAN REMEDYING HEALTH STATE, UNSPECIFIED: ICD-10-CM

## 2022-07-06 PROCEDURE — OTHER BOTOX: OTHER

## 2022-07-06 RX ORDER — TRETIONIN 0.25 MG/G
CREAM TOPICAL
Qty: 1 | Refills: 3 | Status: ERX | COMMUNITY
Start: 2022-07-06

## 2022-07-06 NOTE — PROCEDURE: BOTOX
Lateral Platysmal Bands Units: 0
Show Orbicularis Oculi Units: Yes
Show Right And Left Pupillary Line Units: No
Post-Care Instructions: Patient instructed to not lie down for 4 hours and limit physical activity for 24 hours.
Consent: Written consent obtained. Risks include but not limited to lid/brow ptosis, bruising, swelling, diplopia, temporary effect, incomplete chemical denervation.
Detail Level: Detailed
Additional Area 1 Units: 2
Additional Area 1 Location: Lateral brow
Glabellar Complex Units: 18
Dilution (U/0.1 Cc): 10

## 2022-10-12 ENCOUNTER — APPOINTMENT (OUTPATIENT)
Dept: URBAN - METROPOLITAN AREA CLINIC 257 | Age: 42
Setting detail: DERMATOLOGY
End: 2022-10-17

## 2022-10-12 DIAGNOSIS — Z41.9 ENCOUNTER FOR PROCEDURE FOR PURPOSES OTHER THAN REMEDYING HEALTH STATE, UNSPECIFIED: ICD-10-CM

## 2022-10-12 PROCEDURE — OTHER BOTOX: OTHER

## 2022-10-12 ASSESSMENT — LOCATION ZONE DERM: LOCATION ZONE: FACE

## 2022-10-12 ASSESSMENT — LOCATION SIMPLE DESCRIPTION DERM
LOCATION SIMPLE: LEFT FOREHEAD
LOCATION SIMPLE: INFERIOR FOREHEAD
LOCATION SIMPLE: SUPERIOR FOREHEAD
LOCATION SIMPLE: RIGHT EYEBROW

## 2022-10-12 ASSESSMENT — LOCATION DETAILED DESCRIPTION DERM
LOCATION DETAILED: RIGHT LATERAL EYEBROW
LOCATION DETAILED: INFERIOR MID FOREHEAD
LOCATION DETAILED: LEFT INFERIOR LATERAL FOREHEAD
LOCATION DETAILED: SUPERIOR MID FOREHEAD

## 2022-10-15 ENCOUNTER — HEALTH MAINTENANCE LETTER (OUTPATIENT)
Age: 42
End: 2022-10-15

## 2023-01-19 ENCOUNTER — ANCILLARY PROCEDURE (OUTPATIENT)
Dept: MAMMOGRAPHY | Facility: CLINIC | Age: 43
End: 2023-01-19
Attending: FAMILY MEDICINE
Payer: COMMERCIAL

## 2023-01-19 DIAGNOSIS — Z12.31 VISIT FOR SCREENING MAMMOGRAM: ICD-10-CM

## 2023-01-19 PROCEDURE — 77063 BREAST TOMOSYNTHESIS BI: CPT | Mod: GC | Performed by: STUDENT IN AN ORGANIZED HEALTH CARE EDUCATION/TRAINING PROGRAM

## 2023-01-19 PROCEDURE — 77067 SCR MAMMO BI INCL CAD: CPT | Mod: GC | Performed by: STUDENT IN AN ORGANIZED HEALTH CARE EDUCATION/TRAINING PROGRAM

## 2023-01-26 ENCOUNTER — ANCILLARY PROCEDURE (OUTPATIENT)
Dept: MAMMOGRAPHY | Facility: CLINIC | Age: 43
End: 2023-01-26
Attending: FAMILY MEDICINE
Payer: COMMERCIAL

## 2023-01-26 DIAGNOSIS — R92.8 ABNORMAL MAMMOGRAM OF RIGHT BREAST: ICD-10-CM

## 2023-01-26 PROCEDURE — G0279 TOMOSYNTHESIS, MAMMO: HCPCS | Performed by: RADIOLOGY

## 2023-01-26 PROCEDURE — 77065 DX MAMMO INCL CAD UNI: CPT | Mod: RT | Performed by: RADIOLOGY

## 2023-02-05 NOTE — NURSING NOTE
"Chief Complaint   Patient presents with     Urgent Care     Pain     Patient is 17 weeks pregnant and experiencing some cramping along with sharp pain on and off in abdomen and back. Patient is also concern that this may be a UTI. Pain rating at 3-4/10 for cramping and 5/10 for sharp pain.       Initial /86 (BP Location: Right arm, Patient Position: Sitting, Cuff Size: Adult Regular)  Pulse 81  Temp 98.1  F (36.7  C) (Tympanic)  LMP 11/01/2017  SpO2 98% Estimated body mass index is 30.38 kg/(m^2) as calculated from the following:    Height as of 1/10/18: 5' 7\" (1.702 m).    Weight as of 2/13/18: 194 lb (88 kg).  Medication Reconciliation: complete.  MEGAN Vargas      "
DISCHARGE

## 2023-03-08 ENCOUNTER — APPOINTMENT (OUTPATIENT)
Dept: URBAN - METROPOLITAN AREA CLINIC 257 | Age: 43
Setting detail: DERMATOLOGY
End: 2023-03-09

## 2023-03-08 DIAGNOSIS — Z41.9 ENCOUNTER FOR PROCEDURE FOR PURPOSES OTHER THAN REMEDYING HEALTH STATE, UNSPECIFIED: ICD-10-CM

## 2023-03-08 PROCEDURE — OTHER BOTOX: OTHER

## 2023-06-07 ENCOUNTER — RX ONLY (RX ONLY)
Age: 43
End: 2023-06-07

## 2023-06-07 ENCOUNTER — APPOINTMENT (OUTPATIENT)
Dept: URBAN - METROPOLITAN AREA CLINIC 257 | Age: 43
Setting detail: DERMATOLOGY
End: 2023-06-13

## 2023-06-07 DIAGNOSIS — Z41.9 ENCOUNTER FOR PROCEDURE FOR PURPOSES OTHER THAN REMEDYING HEALTH STATE, UNSPECIFIED: ICD-10-CM

## 2023-06-07 PROCEDURE — OTHER BOTOX: OTHER

## 2023-06-07 PROCEDURE — OTHER INVENTORY: OTHER

## 2023-06-07 PROCEDURE — OTHER MIPS QUALITY: OTHER

## 2023-06-07 RX ORDER — TRETIONIN 0.5 MG/G
CREAM TOPICAL
Qty: 45 | Refills: 3 | Status: ERX | COMMUNITY
Start: 2023-06-07

## 2023-06-07 NOTE — HPI: COSMETIC (BOTOX)
Have You Had Botox Before?: has had botox
Additional History: Pt was happy with her previous treatment and would like the same today.
When Was Your Last Botox Treatment?: 03/08/2023

## 2023-06-07 NOTE — PROCEDURE: BOTOX
Show Masseter Units: Yes
Lcl Root Units: 0
Glabellar Complex Units: 18
Show Right And Left Brow Units: No
Show Inventory Tab: Hide
Consent: Written consent obtained. Risks include but not limited to lid/brow ptosis, bruising, swelling, diplopia, temporary effect, incomplete chemical denervation.
Post-Care Instructions: Patient instructed to not lie down for 4 hours and limit physical activity for 24 hours.
Dilution (U/0.1 Cc): 10
Additional Area 1 Location: lateral brow
Incrementing Botox Units: By 0.5 Units
Additional Area 1 Units: 2
Detail Level: Detailed

## 2023-08-30 ENCOUNTER — APPOINTMENT (OUTPATIENT)
Dept: URBAN - METROPOLITAN AREA CLINIC 257 | Age: 43
Setting detail: DERMATOLOGY
End: 2023-09-05

## 2023-08-30 DIAGNOSIS — Z41.9 ENCOUNTER FOR PROCEDURE FOR PURPOSES OTHER THAN REMEDYING HEALTH STATE, UNSPECIFIED: ICD-10-CM

## 2023-08-30 PROCEDURE — OTHER INVENTORY: OTHER

## 2023-08-30 PROCEDURE — OTHER MIPS QUALITY: OTHER

## 2023-08-30 PROCEDURE — OTHER BOTOX: OTHER

## 2023-10-29 ENCOUNTER — HEALTH MAINTENANCE LETTER (OUTPATIENT)
Age: 43
End: 2023-10-29

## 2023-11-29 ENCOUNTER — APPOINTMENT (OUTPATIENT)
Dept: URBAN - METROPOLITAN AREA CLINIC 257 | Age: 43
Setting detail: DERMATOLOGY
End: 2023-11-29

## 2023-11-29 DIAGNOSIS — Z41.9 ENCOUNTER FOR PROCEDURE FOR PURPOSES OTHER THAN REMEDYING HEALTH STATE, UNSPECIFIED: ICD-10-CM

## 2023-11-29 PROCEDURE — OTHER INVENTORY: OTHER

## 2023-11-29 PROCEDURE — OTHER BOTOX: OTHER

## 2024-02-01 ENCOUNTER — HOSPITAL ENCOUNTER (OUTPATIENT)
Dept: MAMMOGRAPHY | Facility: CLINIC | Age: 44
Discharge: HOME | End: 2024-02-01
Attending: FAMILY MEDICINE | Admitting: FAMILY MEDICINE
Payer: COMMERCIAL

## 2024-02-01 DIAGNOSIS — Z12.31 VISIT FOR SCREENING MAMMOGRAM: ICD-10-CM

## 2024-02-01 PROCEDURE — 77063 BREAST TOMOSYNTHESIS BI: CPT

## 2024-02-21 ENCOUNTER — RX ONLY (RX ONLY)
Age: 44
End: 2024-02-21

## 2024-02-21 ENCOUNTER — APPOINTMENT (OUTPATIENT)
Dept: URBAN - METROPOLITAN AREA CLINIC 257 | Age: 44
Setting detail: DERMATOLOGY
End: 2024-02-27

## 2024-02-21 DIAGNOSIS — Z41.9 ENCOUNTER FOR PROCEDURE FOR PURPOSES OTHER THAN REMEDYING HEALTH STATE, UNSPECIFIED: ICD-10-CM

## 2024-02-21 PROCEDURE — OTHER BOTOX: OTHER

## 2024-02-21 PROCEDURE — OTHER INVENTORY: OTHER

## 2024-02-21 RX ORDER — CLOBETASOL PROPIONATE 0.5 MG/G
CREAM TOPICAL
Qty: 60 | Refills: 0 | Status: ERX | COMMUNITY
Start: 2024-02-21

## 2024-02-21 NOTE — HPI: COSMETIC (BOTOX)
previous_has_had_botox
Additional History: Laurie says she was happy with the results of her last treatment and would like the same quantities in the same areas today.
When Was Your Last Botox Treatment?: 11/30/23

## 2024-02-21 NOTE — PROCEDURE: BOTOX
Inferior Lateral Orbicularis Oculi Units: 0
Dilution (U/0.1 Cc): 10
Glabellar Complex Units: 18
Additional Area 1 Location: lateral brow
Show Inventory Tab: Hide
Show Additional Area 3: Yes
Additional Area 1 Units: 2
Show Ucl Units: No
Consent: Written consent obtained. Risks include but not limited to lid/brow ptosis, bruising, swelling, diplopia, temporary effect, incomplete chemical denervation.
Post-Care Instructions: Patient instructed to not lie down for 4 hours and limit physical activity for 24 hours.
Incrementing Botox Units: By 0.5 Units
Detail Level: Detailed

## 2024-05-15 ENCOUNTER — APPOINTMENT (OUTPATIENT)
Dept: URBAN - METROPOLITAN AREA CLINIC 257 | Age: 44
Setting detail: DERMATOLOGY
End: 2024-05-21

## 2024-05-15 DIAGNOSIS — Z41.9 ENCOUNTER FOR PROCEDURE FOR PURPOSES OTHER THAN REMEDYING HEALTH STATE, UNSPECIFIED: ICD-10-CM

## 2024-05-15 PROCEDURE — OTHER INVENTORY: OTHER

## 2024-05-15 PROCEDURE — OTHER BOTOX: OTHER

## 2024-05-15 NOTE — PROCEDURE: BOTOX
Additional Area 5 Units: 0
Show Depressor Anguli Units: Yes
Show Right And Left Brow Units: No
Dilution (U/0.1 Cc): 10
Additional Area 1 Location: lateral brow
Additional Area 1 Units: 2
Incrementing Botox Units: By 0.5 Units
Consent: Written consent obtained. Risks include but not limited to lid/brow ptosis, bruising, swelling, diplopia, temporary effect, incomplete chemical denervation.
Detail Level: Detailed
Post-Care Instructions: Patient instructed to not lie down for 4 hours and limit physical activity for 24 hours.
Glabellar Complex Units: 18
Show Inventory Tab: Hide

## 2024-05-15 NOTE — HPI: COSMETIC (BOTOX)
previous_has_had_botox
Additional History: Pt states she likes treatment that she had done last time.
When Was Your Last Botox Treatment?: 02/2024

## 2024-08-20 ENCOUNTER — APPOINTMENT (OUTPATIENT)
Dept: URBAN - METROPOLITAN AREA CLINIC 257 | Age: 44
Setting detail: DERMATOLOGY
End: 2024-08-27

## 2024-08-20 DIAGNOSIS — Z41.9 ENCOUNTER FOR PROCEDURE FOR PURPOSES OTHER THAN REMEDYING HEALTH STATE, UNSPECIFIED: ICD-10-CM

## 2024-08-20 PROCEDURE — OTHER BOTOX: OTHER

## 2024-08-20 PROCEDURE — OTHER INVENTORY: OTHER

## 2024-08-20 NOTE — HPI: COSMETIC (BOTOX)
previous_has_had_botox
Additional History: Patient reports her last treatment was well tolerated and she would like the same thing. Patient denies any other concerns.
When Was Your Last Botox Treatment?: 05/2024

## 2024-08-20 NOTE — PROCEDURE: BOTOX
Additional Area 2 Units: 0
Show Lcl Units: No
Post-Care Instructions: Patient instructed to not lie down for 4 hours and limit physical activity for 24 hours.
Show Additional Area 5: Yes
Dilution (U/0.1 Cc): 10
Additional Area 1 Location: Lateral Brow
Additional Area 1 Units: 2
Incrementing Botox Units: By 0.5 Units
Detail Level: Detailed
Glabellar Complex Units: 18
Consent: Written consent obtained. Risks include but not limited to lid/brow ptosis, bruising, swelling, diplopia, temporary effect, incomplete chemical denervation.
Show Inventory Tab: Hide

## 2024-11-20 ENCOUNTER — APPOINTMENT (OUTPATIENT)
Dept: URBAN - METROPOLITAN AREA CLINIC 257 | Age: 44
Setting detail: DERMATOLOGY
End: 2024-12-02

## 2024-11-20 DIAGNOSIS — Z41.9 ENCOUNTER FOR PROCEDURE FOR PURPOSES OTHER THAN REMEDYING HEALTH STATE, UNSPECIFIED: ICD-10-CM

## 2024-11-20 PROCEDURE — OTHER INVENTORY: OTHER

## 2024-11-20 PROCEDURE — OTHER BOTOX: OTHER

## 2024-11-20 NOTE — PROCEDURE: BOTOX
Show Mentalis Units: No
Show Inventory Tab: Hide
Left Periorbital Skin Units: 0
Show Forehead Units: Yes
Glabellar Complex Units: 18
Forehead Units: 10
Detail Level: Detailed
Incrementing Botox Units: By 0.5 Units
Consent: Written consent obtained. Risks include but not limited to lid/brow ptosis, bruising, swelling, diplopia, temporary effect, incomplete chemical denervation.
Post-Care Instructions: Patient instructed to not lie down for 4 hours and limit physical activity for 24 hours.
Additional Area 1 Location: lateral brow
Additional Area 1 Units: 2

## 2024-11-20 NOTE — HPI: COSMETIC (BOTOX)
previous_has_had_botox
Additional History: Pt report she liked last treatment and she would like the same at this time.
When Was Your Last Botox Treatment?: 08/20/24

## 2024-12-21 ENCOUNTER — HEALTH MAINTENANCE LETTER (OUTPATIENT)
Age: 44
End: 2024-12-21

## 2025-02-18 ENCOUNTER — ANCILLARY PROCEDURE (OUTPATIENT)
Dept: MAMMOGRAPHY | Facility: CLINIC | Age: 45
End: 2025-02-18
Attending: FAMILY MEDICINE
Payer: COMMERCIAL

## 2025-02-18 DIAGNOSIS — Z12.31 VISIT FOR SCREENING MAMMOGRAM: ICD-10-CM

## 2025-02-27 ENCOUNTER — APPOINTMENT (OUTPATIENT)
Dept: URBAN - METROPOLITAN AREA CLINIC 257 | Age: 45
Setting detail: DERMATOLOGY
End: 2025-02-27

## 2025-02-27 DIAGNOSIS — Z41.9 ENCOUNTER FOR PROCEDURE FOR PURPOSES OTHER THAN REMEDYING HEALTH STATE, UNSPECIFIED: ICD-10-CM

## 2025-02-27 PROCEDURE — OTHER BOTOX: OTHER

## 2025-02-27 PROCEDURE — OTHER INVENTORY: OTHER

## 2025-02-27 ASSESSMENT — LOCATION DETAILED DESCRIPTION DERM
LOCATION DETAILED: RIGHT INFERIOR FOREHEAD
LOCATION DETAILED: LEFT LATERAL FOREHEAD
LOCATION DETAILED: LEFT CENTRAL EYEBROW
LOCATION DETAILED: RIGHT MID TEMPLE
LOCATION DETAILED: LEFT SUPERIOR FOREHEAD
LOCATION DETAILED: LEFT MID TEMPLE
LOCATION DETAILED: MEDIAL FRONTAL SCALP
LOCATION DETAILED: RIGHT MEDIAL EYEBROW
LOCATION DETAILED: RIGHT SUPERIOR FOREHEAD
LOCATION DETAILED: GLABELLA
LOCATION DETAILED: RIGHT LATERAL FOREHEAD

## 2025-02-27 ASSESSMENT — LOCATION SIMPLE DESCRIPTION DERM
LOCATION SIMPLE: LEFT TEMPLE
LOCATION SIMPLE: LEFT EYEBROW
LOCATION SIMPLE: FRONTAL SCALP
LOCATION SIMPLE: RIGHT TEMPLE
LOCATION SIMPLE: GLABELLA
LOCATION SIMPLE: LEFT FOREHEAD
LOCATION SIMPLE: RIGHT EYEBROW
LOCATION SIMPLE: RIGHT FOREHEAD

## 2025-02-27 ASSESSMENT — LOCATION ZONE DERM
LOCATION ZONE: SCALP
LOCATION ZONE: FACE

## 2025-02-27 NOTE — HPI: COSMETIC (BOTOX)
previous_has_had_botox
Additional History: Patient states she would like the same treatment as last time.
When Was Your Last Botox Treatment?: 11/20/24

## 2025-02-27 NOTE — PROCEDURE: BOTOX
Show Right And Left Periorbital Units: No
Right Pupillary Line Units: 0
Show Masseter Units: Yes
Detail Level: Detailed
Post-Care Instructions: Patient instructed to not lie down for 4 hours and limit physical activity for 24 hours.
Additional Area 1 Location: lateral brow
Forehead Units: 10
Show Inventory Tab: Hide
Glabellar Complex Units: 18
Additional Area 1 Units: 2
Patient Specific Comments (Will Not Stick From Patient To Patient): 2:1 dilution used for frontalis\\n$10/unit promo
Consent: Written consent obtained. Risks include but not limited to lid/brow ptosis, bruising, swelling, diplopia, temporary effect, incomplete chemical denervation.
Incrementing Botox Units: By 0.5 Units

## 2025-03-13 ENCOUNTER — HOSPITAL ENCOUNTER (OUTPATIENT)
Dept: MAMMOGRAPHY | Facility: CLINIC | Age: 45
Discharge: HOME | End: 2025-03-13
Attending: FAMILY MEDICINE
Payer: COMMERCIAL

## 2025-03-13 DIAGNOSIS — Z12.31 VISIT FOR SCREENING MAMMOGRAM: ICD-10-CM

## 2025-03-13 PROCEDURE — 77063 BREAST TOMOSYNTHESIS BI: CPT

## 2025-05-22 ENCOUNTER — APPOINTMENT (OUTPATIENT)
Dept: URBAN - METROPOLITAN AREA CLINIC 257 | Age: 45
Setting detail: DERMATOLOGY
End: 2025-05-22

## 2025-05-22 DIAGNOSIS — Z41.9 ENCOUNTER FOR PROCEDURE FOR PURPOSES OTHER THAN REMEDYING HEALTH STATE, UNSPECIFIED: ICD-10-CM

## 2025-05-22 PROCEDURE — OTHER BOTOX: OTHER

## 2025-05-22 PROCEDURE — OTHER INVENTORY: OTHER

## 2025-08-14 ENCOUNTER — APPOINTMENT (OUTPATIENT)
Dept: URBAN - METROPOLITAN AREA CLINIC 257 | Age: 45
Setting detail: DERMATOLOGY
End: 2025-08-14

## 2025-08-14 ENCOUNTER — RX ONLY (RX ONLY)
Age: 45
End: 2025-08-14

## 2025-08-14 VITALS — HEIGHT: 67 IN | WEIGHT: 157 LBS

## 2025-08-14 DIAGNOSIS — Z41.9 ENCOUNTER FOR PROCEDURE FOR PURPOSES OTHER THAN REMEDYING HEALTH STATE, UNSPECIFIED: ICD-10-CM

## 2025-08-14 PROCEDURE — OTHER BOTOX: OTHER

## 2025-08-14 PROCEDURE — OTHER INVENTORY: OTHER

## 2025-08-14 RX ORDER — TRETIONIN 1 MG/G
CREAM TOPICAL QHS
Qty: 45 | Refills: 3 | Status: ERX | COMMUNITY
Start: 2025-08-14

## (undated) DEVICE — DRSG TELFA 3X8" 1238

## (undated) DEVICE — PACK TVT HYSTEROSCOPY SMA15HYFSE

## (undated) DEVICE — PREP SKIN SCRUB TRAY 4461A

## (undated) DEVICE — TAPE MEDIPORE 2" 2962

## (undated) DEVICE — GLOVE PROTEXIS MICRO 7.5  2D73PM75

## (undated) DEVICE — SUCTION CANISTER MEDIVAC LINER 3000ML W/LID 65651-530

## (undated) DEVICE — PACK C-SECTION LF PL15OTA83B

## (undated) DEVICE — DRSG GAUZE 4X4" TOPPER

## (undated) DEVICE — SOL NACL 0.9% INJ 1000ML BAG 2B1324X

## (undated) DEVICE — DECANTER BAG 2002S

## (undated) DEVICE — DEVICE SUTURE GRASPER TROCAR CLOSURE 14GA PMITCSG

## (undated) DEVICE — LINEN C-SECTION 5415

## (undated) DEVICE — SU VICRYL 0 CT-1 27" J340H

## (undated) DEVICE — SOL NACL 0.9% INJ 250ML BAG 2B1322Q

## (undated) DEVICE — ESU FCP OLYMPUS PK CUTTING 5MMX33CM PK-CF0533

## (undated) DEVICE — SU VICRYL 3-0 SH 27" J316H

## (undated) DEVICE — SU PDS II 0 CT 36" Z358T

## (undated) DEVICE — GLOVE PROTEXIS POWDER FREE 7.0 ORTHOPEDIC 2D73ET70

## (undated) DEVICE — SOL RINGERS LACTATED 1000ML BAG 2B2324X

## (undated) DEVICE — CATH TRAY FOLEY 16FR BARDEX W/DRAIN BAG STATLOCK 300316A

## (undated) DEVICE — SU VICRYL 4-0 PS-2 18" UND J496H

## (undated) DEVICE — SOL WATER IRRIG 1000ML BOTTLE 2F7114

## (undated) DEVICE — SOL NACL 0.9% IRRIG 1000ML BOTTLE 2F7124

## (undated) DEVICE — TUBING IRRIG TUR Y TYPE 96" LF 6543-01

## (undated) DEVICE — LINEN TOWEL PACK X5 5464

## (undated) DEVICE — Device

## (undated) DEVICE — SU VICRYL 2-0 UR-6 27" J602H

## (undated) DEVICE — SU VICRYL 2-0 TIE 12X18" J905T

## (undated) DEVICE — GLOVE PROTEXIS W/NEU-THERA 8.0  2D73TE80

## (undated) DEVICE — TUBING HYDRO-SURG PLUS LAP IRRIGATOR SUCTION 0026870

## (undated) DEVICE — NDL INSUFFLATION 120MM VERRES 172015

## (undated) DEVICE — MORCELLATOR LAPAROSCOPIC LINA XCISE MOR-1515-6

## (undated) DEVICE — EVAC SYSTEM CLEAR FLOW SC082500

## (undated) DEVICE — GLOVE PROTEXIS W/NEU-THERA 7.5  2D73TE75

## (undated) DEVICE — SYR 05ML LL W/O NDL

## (undated) DEVICE — ENDO TROCAR OPTICAL 11MM VERSAPORT PLUS W/FIX CAN ONB11STF

## (undated) DEVICE — PREP CHLORAPREP 26ML TINTED ORANGE  260815

## (undated) DEVICE — DRSG STERI STRIP 1/2X4" R1547

## (undated) DEVICE — ESU GROUND PAD UNIVERSAL W/O CORD

## (undated) DEVICE — TUBING SUCTION 12"X1/4" N612

## (undated) DEVICE — SOL NACL 0.9% IRRIG 1000ML BOTTLE 07138-09

## (undated) DEVICE — DRSG BANDAID 1X3" FABRIC CURITY LATEX FREE KC44101

## (undated) DEVICE — BLADE CLIPPER 4406

## (undated) DEVICE — NDL SPINAL 22GA 7" QUINCKE 405149

## (undated) RX ORDER — FENTANYL CITRATE 50 UG/ML
INJECTION, SOLUTION INTRAMUSCULAR; INTRAVENOUS
Status: DISPENSED
Start: 2017-07-20

## (undated) RX ORDER — OXYCODONE AND ACETAMINOPHEN 5; 325 MG/1; MG/1
TABLET ORAL
Status: DISPENSED
Start: 2017-07-20

## (undated) RX ORDER — PROPOFOL 10 MG/ML
INJECTION, EMULSION INTRAVENOUS
Status: DISPENSED
Start: 2017-07-20

## (undated) RX ORDER — NEOSTIGMINE METHYLSULFATE 1 MG/ML
VIAL (ML) INJECTION
Status: DISPENSED
Start: 2017-07-20

## (undated) RX ORDER — OXYTOCIN/0.9 % SODIUM CHLORIDE 30/500 ML
PLASTIC BAG, INJECTION (ML) INTRAVENOUS
Status: DISPENSED
Start: 2020-09-16

## (undated) RX ORDER — GLYCOPYRROLATE 0.2 MG/ML
INJECTION, SOLUTION INTRAMUSCULAR; INTRAVENOUS
Status: DISPENSED
Start: 2017-07-20

## (undated) RX ORDER — CEFAZOLIN SODIUM 2 G/100ML
INJECTION, SOLUTION INTRAVENOUS
Status: DISPENSED
Start: 2017-07-20

## (undated) RX ORDER — ONDANSETRON 2 MG/ML
INJECTION INTRAMUSCULAR; INTRAVENOUS
Status: DISPENSED
Start: 2017-07-20

## (undated) RX ORDER — MORPHINE SULFATE 1 MG/ML
INJECTION, SOLUTION EPIDURAL; INTRATHECAL; INTRAVENOUS
Status: DISPENSED
Start: 2020-09-16

## (undated) RX ORDER — ONDANSETRON 2 MG/ML
INJECTION INTRAMUSCULAR; INTRAVENOUS
Status: DISPENSED
Start: 2020-09-16

## (undated) RX ORDER — DEXAMETHASONE SODIUM PHOSPHATE 4 MG/ML
INJECTION, SOLUTION INTRA-ARTICULAR; INTRALESIONAL; INTRAMUSCULAR; INTRAVENOUS; SOFT TISSUE
Status: DISPENSED
Start: 2017-07-20

## (undated) RX ORDER — LIDOCAINE HYDROCHLORIDE 20 MG/ML
INJECTION, SOLUTION EPIDURAL; INFILTRATION; INTRACAUDAL; PERINEURAL
Status: DISPENSED
Start: 2017-07-20